# Patient Record
Sex: FEMALE | Race: WHITE | NOT HISPANIC OR LATINO | Employment: FULL TIME | URBAN - METROPOLITAN AREA
[De-identification: names, ages, dates, MRNs, and addresses within clinical notes are randomized per-mention and may not be internally consistent; named-entity substitution may affect disease eponyms.]

---

## 2023-05-04 LAB
ALANINE AMINOTRANSFERASE (SGPT) (U/L) IN SER/PLAS: 12 U/L (ref 7–45)
ALBUMIN (G/DL) IN SER/PLAS: 3.9 G/DL (ref 3.4–5)
ALKALINE PHOSPHATASE (U/L) IN SER/PLAS: 80 U/L (ref 33–110)
ANION GAP IN SER/PLAS: 12 MMOL/L (ref 10–20)
ASPARTATE AMINOTRANSFERASE (SGOT) (U/L) IN SER/PLAS: 11 U/L (ref 9–39)
BASOPHILS (10*3/UL) IN BLOOD BY AUTOMATED COUNT: 0.02 X10E9/L (ref 0–0.1)
BASOPHILS/100 LEUKOCYTES IN BLOOD BY AUTOMATED COUNT: 0.2 % (ref 0–2)
BILIRUBIN TOTAL (MG/DL) IN SER/PLAS: 0.5 MG/DL (ref 0–1.2)
CALCIDIOL (25 OH VITAMIN D3) (NG/ML) IN SER/PLAS: 32 NG/ML
CALCIUM (MG/DL) IN SER/PLAS: 9.5 MG/DL (ref 8.6–10.6)
CARBON DIOXIDE, TOTAL (MMOL/L) IN SER/PLAS: 25 MMOL/L (ref 21–32)
CHLORIDE (MMOL/L) IN SER/PLAS: 109 MMOL/L (ref 98–107)
CHOLESTEROL (MG/DL) IN SER/PLAS: 175 MG/DL (ref 0–199)
CHOLESTEROL IN HDL (MG/DL) IN SER/PLAS: 44.9 MG/DL
CHOLESTEROL/HDL RATIO: 3.9
COBALAMIN (VITAMIN B12) (PG/ML) IN SER/PLAS: 383 PG/ML (ref 211–911)
CREATININE (MG/DL) IN SER/PLAS: 0.86 MG/DL (ref 0.5–1.05)
EOSINOPHILS (10*3/UL) IN BLOOD BY AUTOMATED COUNT: 0.14 X10E9/L (ref 0–0.7)
EOSINOPHILS/100 LEUKOCYTES IN BLOOD BY AUTOMATED COUNT: 1.6 % (ref 0–6)
ERYTHROCYTE DISTRIBUTION WIDTH (RATIO) BY AUTOMATED COUNT: 12.3 % (ref 11.5–14.5)
ERYTHROCYTE MEAN CORPUSCULAR HEMOGLOBIN CONCENTRATION (G/DL) BY AUTOMATED: 32.9 G/DL (ref 32–36)
ERYTHROCYTE MEAN CORPUSCULAR VOLUME (FL) BY AUTOMATED COUNT: 94 FL (ref 80–100)
ERYTHROCYTES (10*6/UL) IN BLOOD BY AUTOMATED COUNT: 4.45 X10E12/L (ref 4–5.2)
ESTIMATED AVERAGE GLUCOSE FOR HBA1C: 94 MG/DL
FERRITIN (UG/LL) IN SER/PLAS: 65 UG/L (ref 8–150)
GFR FEMALE: 85 ML/MIN/1.73M2
GLUCOSE (MG/DL) IN SER/PLAS: 86 MG/DL (ref 74–99)
HEMATOCRIT (%) IN BLOOD BY AUTOMATED COUNT: 41.9 % (ref 36–46)
HEMOGLOBIN (G/DL) IN BLOOD: 13.8 G/DL (ref 12–16)
HEMOGLOBIN A1C/HEMOGLOBIN TOTAL IN BLOOD: 4.9 %
IMMATURE GRANULOCYTES/100 LEUKOCYTES IN BLOOD BY AUTOMATED COUNT: 0.2 % (ref 0–0.9)
LDL: 110 MG/DL (ref 0–99)
LEUKOCYTES (10*3/UL) IN BLOOD BY AUTOMATED COUNT: 8.6 X10E9/L (ref 4.4–11.3)
LYMPHOCYTES (10*3/UL) IN BLOOD BY AUTOMATED COUNT: 3.25 X10E9/L (ref 1.2–4.8)
LYMPHOCYTES/100 LEUKOCYTES IN BLOOD BY AUTOMATED COUNT: 37.7 % (ref 13–44)
MONOCYTES (10*3/UL) IN BLOOD BY AUTOMATED COUNT: 0.59 X10E9/L (ref 0.1–1)
MONOCYTES/100 LEUKOCYTES IN BLOOD BY AUTOMATED COUNT: 6.9 % (ref 2–10)
NEUTROPHILS (10*3/UL) IN BLOOD BY AUTOMATED COUNT: 4.59 X10E9/L (ref 1.2–7.7)
NEUTROPHILS/100 LEUKOCYTES IN BLOOD BY AUTOMATED COUNT: 53.4 % (ref 40–80)
NRBC (PER 100 WBCS) BY AUTOMATED COUNT: 0 /100 WBC (ref 0–0)
PLATELETS (10*3/UL) IN BLOOD AUTOMATED COUNT: 282 X10E9/L (ref 150–450)
POTASSIUM (MMOL/L) IN SER/PLAS: 4 MMOL/L (ref 3.5–5.3)
PROTEIN TOTAL: 6.9 G/DL (ref 6.4–8.2)
SODIUM (MMOL/L) IN SER/PLAS: 142 MMOL/L (ref 136–145)
THYROTROPIN (MIU/L) IN SER/PLAS BY DETECTION LIMIT <= 0.05 MIU/L: 1.05 MIU/L (ref 0.44–3.98)
TRIGLYCERIDE (MG/DL) IN SER/PLAS: 99 MG/DL (ref 0–149)
UREA NITROGEN (MG/DL) IN SER/PLAS: 18 MG/DL (ref 6–23)
VLDL: 20 MG/DL (ref 0–40)

## 2023-08-30 PROBLEM — N20.0 NEPHROLITHIASIS: Status: ACTIVE | Noted: 2023-08-30

## 2023-08-30 PROBLEM — G47.00 INSOMNIA: Status: ACTIVE | Noted: 2023-08-30

## 2023-08-30 PROBLEM — B08.5 ACUTE HERPANGINA: Status: ACTIVE | Noted: 2023-08-30

## 2023-08-30 PROBLEM — E66.811 OBESITY, CLASS I, BMI 30-34.9: Status: ACTIVE | Noted: 2023-08-30

## 2023-08-30 PROBLEM — M17.12 OSTEOARTHRITIS OF LEFT KNEE: Status: ACTIVE | Noted: 2023-08-30

## 2023-08-30 PROBLEM — M17.11 OSTEOARTHRITIS OF RIGHT KNEE: Status: ACTIVE | Noted: 2023-08-30

## 2023-08-30 PROBLEM — R82.993 HIGH URIC ACID IN 24 HOUR URINE SPECIMEN: Status: ACTIVE | Noted: 2023-08-30

## 2023-08-30 PROBLEM — F43.23 ADJUSTMENT DISORDER WITH MIXED ANXIETY AND DEPRESSED MOOD: Status: ACTIVE | Noted: 2023-08-30

## 2023-08-30 PROBLEM — M25.569 KNEE PAIN: Status: ACTIVE | Noted: 2023-08-30

## 2023-08-30 PROBLEM — N95.2 VAGINAL ATROPHY: Status: ACTIVE | Noted: 2023-08-30

## 2023-08-30 PROBLEM — K58.2 MIXED IRRITABLE BOWEL SYNDROME: Status: ACTIVE | Noted: 2023-08-30

## 2023-08-30 PROBLEM — M25.361 INSTABILITY OF RIGHT KNEE JOINT: Status: ACTIVE | Noted: 2023-08-30

## 2023-08-30 PROBLEM — R10.9 LEFT FLANK PAIN: Status: ACTIVE | Noted: 2023-08-30

## 2023-08-30 PROBLEM — K59.04 CHRONIC IDIOPATHIC CONSTIPATION: Status: ACTIVE | Noted: 2023-08-30

## 2023-08-30 PROBLEM — N20.0 CALCIUM OXALATE STONES: Status: ACTIVE | Noted: 2023-08-30

## 2023-08-30 PROBLEM — G47.33 OSA (OBSTRUCTIVE SLEEP APNEA): Status: ACTIVE | Noted: 2023-08-30

## 2023-08-30 PROBLEM — R14.0 ABDOMINAL BLOATING: Status: ACTIVE | Noted: 2023-08-30

## 2023-08-30 PROBLEM — M54.2 NECK PAIN: Status: ACTIVE | Noted: 2023-08-30

## 2023-08-30 PROBLEM — N89.8 VAGINAL IRRITATION: Status: ACTIVE | Noted: 2023-08-30

## 2023-08-30 PROBLEM — I47.11 INAPPROPRIATE SINUS TACHYCARDIA (CMS-HCC): Status: ACTIVE | Noted: 2023-08-30

## 2023-08-30 PROBLEM — N32.81 OVERACTIVE BLADDER: Status: ACTIVE | Noted: 2023-08-30

## 2023-08-30 PROBLEM — E66.9 OBESITY, CLASS I, BMI 30-34.9: Status: ACTIVE | Noted: 2023-08-30

## 2023-08-30 PROBLEM — F32.A DEPRESSION: Status: ACTIVE | Noted: 2023-08-30

## 2023-08-30 PROBLEM — G89.18 POSTOPERATIVE PAIN: Status: ACTIVE | Noted: 2023-08-30

## 2023-08-30 PROBLEM — H02.886 MEIBOMIAN GLAND DYSFUNCTION (MGD) OF LEFT EYE: Status: ACTIVE | Noted: 2023-08-30

## 2023-08-30 PROBLEM — R35.0 URINARY FREQUENCY: Status: ACTIVE | Noted: 2023-08-30

## 2023-08-30 PROBLEM — R63.5 WEIGHT GAIN: Status: ACTIVE | Noted: 2023-08-30

## 2023-08-30 PROBLEM — M25.561 RIGHT KNEE PAIN: Status: ACTIVE | Noted: 2023-08-30

## 2023-08-30 PROBLEM — F41.9 ANXIETY: Status: ACTIVE | Noted: 2023-08-30

## 2023-08-30 PROBLEM — R32 INCONTINENCE IN FEMALE: Status: ACTIVE | Noted: 2023-08-30

## 2023-08-30 PROBLEM — K58.0 IRRITABLE BOWEL SYNDROME WITH DIARRHEA: Status: ACTIVE | Noted: 2023-08-30

## 2023-08-30 PROBLEM — R00.2 PALPITATIONS: Status: ACTIVE | Noted: 2023-08-30

## 2023-08-30 PROBLEM — E55.9 VITAMIN D DEFICIENCY: Status: ACTIVE | Noted: 2023-08-30

## 2023-08-30 PROBLEM — R53.83 FATIGUE: Status: ACTIVE | Noted: 2023-08-30

## 2023-08-30 PROBLEM — R10.13 DYSPEPSIA: Status: ACTIVE | Noted: 2023-08-30

## 2023-08-30 PROBLEM — E05.90 HYPERTHYROIDISM: Status: ACTIVE | Noted: 2023-08-30

## 2023-08-30 PROBLEM — G24.3 CERVICAL DYSTONIA: Status: ACTIVE | Noted: 2023-08-30

## 2023-08-30 PROBLEM — G43.711 CHRONIC MIGRAINE WITHOUT AURA, WITH INTRACTABLE MIGRAINE, SO STATED, WITH STATUS MIGRAINOSUS: Status: ACTIVE | Noted: 2023-08-30

## 2023-08-30 PROBLEM — K58.1 IRRITABLE BOWEL SYNDROME WITH CONSTIPATION: Status: ACTIVE | Noted: 2023-08-30

## 2023-08-30 PROBLEM — R10.2 PELVIC PAIN IN FEMALE: Status: ACTIVE | Noted: 2023-08-30

## 2023-08-30 PROBLEM — K30 FUNCTIONAL DYSPEPSIA: Status: ACTIVE | Noted: 2023-08-30

## 2023-08-30 PROBLEM — G43.909 MIGRAINE: Status: ACTIVE | Noted: 2023-08-30

## 2023-08-30 PROBLEM — D72.829 LEUKOCYTOSIS: Status: ACTIVE | Noted: 2023-08-30

## 2023-08-30 PROBLEM — R31.0 FRANK HEMATURIA: Status: ACTIVE | Noted: 2023-08-30

## 2023-08-30 PROBLEM — N39.3 URINE, INCONTINENCE, STRESS FEMALE: Status: ACTIVE | Noted: 2023-08-30

## 2023-08-30 RX ORDER — TAMSULOSIN HYDROCHLORIDE 0.4 MG/1
0.4 CAPSULE ORAL DAILY
COMMUNITY
End: 2023-10-09 | Stop reason: ALTCHOICE

## 2023-08-30 RX ORDER — LOTEPREDNOL ETABONATE 5 MG/G
GEL OPHTHALMIC
COMMUNITY
End: 2023-10-09 | Stop reason: SDUPTHER

## 2023-08-30 RX ORDER — ERGOCALCIFEROL 1.25 MG/1
1 CAPSULE ORAL
COMMUNITY
Start: 2021-10-07 | End: 2023-10-09 | Stop reason: ALTCHOICE

## 2023-08-30 RX ORDER — DESIPRAMINE HYDROCHLORIDE 10 MG/1
1 TABLET ORAL DAILY
COMMUNITY
Start: 2022-04-07 | End: 2023-10-09 | Stop reason: ALTCHOICE

## 2023-08-30 RX ORDER — METFORMIN HYDROCHLORIDE 1000 MG/1
1 TABLET ORAL
COMMUNITY
Start: 2021-09-28 | End: 2023-10-09 | Stop reason: ALTCHOICE

## 2023-08-30 RX ORDER — NORETHINDRONE ACETATE AND ETHINYL ESTRADIOL AND FERROUS FUMARATE 1MG-20(24)
1 KIT ORAL DAILY
COMMUNITY
Start: 2021-12-10 | End: 2023-10-09 | Stop reason: SDUPTHER

## 2023-08-30 RX ORDER — LIFITEGRAST 50 MG/ML
1 SOLUTION/ DROPS OPHTHALMIC 2 TIMES DAILY
COMMUNITY
Start: 2018-09-19 | End: 2023-10-09 | Stop reason: SDUPTHER

## 2023-08-30 RX ORDER — ONDANSETRON HYDROCHLORIDE 8 MG/1
8 TABLET, FILM COATED ORAL EVERY 8 HOURS PRN
COMMUNITY
Start: 2022-07-13 | End: 2024-02-16 | Stop reason: SDUPTHER

## 2023-08-30 RX ORDER — TIZANIDINE HYDROCHLORIDE 4 MG/1
4 CAPSULE, GELATIN COATED ORAL NIGHTLY
COMMUNITY
Start: 2022-01-19 | End: 2023-10-09 | Stop reason: SDUPTHER

## 2023-08-30 RX ORDER — PREDNISONE 20 MG/1
2 TABLET ORAL DAILY
COMMUNITY
Start: 2022-10-04 | End: 2023-10-09 | Stop reason: ALTCHOICE

## 2023-08-30 RX ORDER — ONDANSETRON 4 MG/1
4 TABLET, ORALLY DISINTEGRATING ORAL EVERY 8 HOURS PRN
COMMUNITY
End: 2023-10-09 | Stop reason: SDUPTHER

## 2023-08-30 RX ORDER — LIRAGLUTIDE 6 MG/ML
3 INJECTION, SOLUTION SUBCUTANEOUS DAILY
COMMUNITY
End: 2023-10-09 | Stop reason: ALTCHOICE

## 2023-08-30 RX ORDER — PRUCALOPRIDE 2 MG/1
1 TABLET, FILM COATED ORAL DAILY
COMMUNITY
Start: 2022-07-22 | End: 2023-10-09 | Stop reason: ALTCHOICE

## 2023-08-30 RX ORDER — CARVEDILOL 3.12 MG/1
3.12 TABLET ORAL 2 TIMES DAILY
COMMUNITY
End: 2023-10-09 | Stop reason: ALTCHOICE

## 2023-08-30 RX ORDER — FREMANEZUMAB-VFRM 225 MG/1.5ML
225 INJECTION SUBCUTANEOUS
COMMUNITY
Start: 2021-10-19 | End: 2023-10-09 | Stop reason: SDUPTHER

## 2023-08-30 RX ORDER — NORETHINDRONE ACETATE AND ETHINYL ESTRADIOL AND FERROUS FUMARATE 1MG-20(24)
KIT ORAL
COMMUNITY
End: 2023-10-09 | Stop reason: ALTCHOICE

## 2023-08-30 RX ORDER — KETOROLAC TROMETHAMINE 10 MG/1
10 TABLET, FILM COATED ORAL EVERY 8 HOURS PRN
COMMUNITY
End: 2023-10-09 | Stop reason: ALTCHOICE

## 2023-08-30 RX ORDER — LATANOPROST 50 UG/ML
1 SOLUTION/ DROPS OPHTHALMIC 2 TIMES DAILY
COMMUNITY
Start: 2020-02-10

## 2023-08-30 RX ORDER — PHENAZOPYRIDINE HYDROCHLORIDE 200 MG/1
200 TABLET, FILM COATED ORAL 3 TIMES DAILY PRN
COMMUNITY
End: 2023-10-09 | Stop reason: ALTCHOICE

## 2023-08-30 RX ORDER — LIDOCAINE HYDROCHLORIDE 20 MG/ML
SOLUTION OROPHARYNGEAL
COMMUNITY
Start: 2022-09-11 | End: 2023-10-09 | Stop reason: ALTCHOICE

## 2023-08-30 RX ORDER — GABAPENTIN 600 MG/1
600 TABLET ORAL DAILY
COMMUNITY
Start: 2020-07-07 | End: 2024-05-31 | Stop reason: SDUPTHER

## 2023-08-30 RX ORDER — METOPROLOL SUCCINATE 50 MG/1
50 TABLET, EXTENDED RELEASE ORAL DAILY
COMMUNITY
End: 2023-10-09 | Stop reason: ALTCHOICE

## 2023-08-30 RX ORDER — UBROGEPANT 50 MG/1
TABLET ORAL
COMMUNITY
End: 2023-10-09 | Stop reason: SDUPTHER

## 2023-08-30 RX ORDER — RIZATRIPTAN BENZOATE 10 MG/1
10 TABLET, ORALLY DISINTEGRATING ORAL ONCE AS NEEDED
COMMUNITY
Start: 2013-06-03 | End: 2023-10-09 | Stop reason: SDUPTHER

## 2023-08-30 RX ORDER — OXYBUTYNIN CHLORIDE 5 MG/1
1 TABLET, EXTENDED RELEASE ORAL DAILY
COMMUNITY
Start: 2022-02-03 | End: 2023-10-09 | Stop reason: SDUPTHER

## 2023-08-30 RX ORDER — BACLOFEN 10 MG/1
10 TABLET ORAL 3 TIMES DAILY PRN
COMMUNITY
End: 2023-10-09 | Stop reason: SDUPTHER

## 2023-08-30 RX ORDER — ACETAMINOPHEN, ASPIRIN (NSAID), AND CAFFEINE 250; 250; 65 MG/1; MG/1; MG/1
2 TABLET, FILM COATED ORAL DAILY
COMMUNITY

## 2023-08-30 RX ORDER — OMEPRAZOLE 20 MG/1
20 CAPSULE, DELAYED RELEASE ORAL
COMMUNITY
Start: 2018-09-10 | End: 2023-10-09 | Stop reason: SDUPTHER

## 2023-08-30 RX ORDER — NORETHINDRONE ACETATE AND ETHINYL ESTRADIOL AND FERROUS FUMARATE 1MG-20(24)
1 KIT ORAL DAILY
COMMUNITY
End: 2023-10-09 | Stop reason: SDUPTHER

## 2023-08-30 RX ORDER — CEPHALEXIN 500 MG/1
CAPSULE ORAL
COMMUNITY
End: 2023-10-09 | Stop reason: ALTCHOICE

## 2023-08-30 RX ORDER — CYANOCOBALAMIN 1000 UG/ML
INJECTION, SOLUTION INTRAMUSCULAR; SUBCUTANEOUS
COMMUNITY

## 2023-08-30 RX ORDER — PLECANATIDE 3 MG/1
1 TABLET ORAL DAILY
COMMUNITY
Start: 2022-10-13

## 2023-08-30 RX ORDER — OXYCODONE HYDROCHLORIDE 5 MG/1
5 TABLET ORAL EVERY 4 HOURS PRN
COMMUNITY
End: 2023-10-09 | Stop reason: ALTCHOICE

## 2023-08-31 ENCOUNTER — PATIENT MESSAGE (OUTPATIENT)
Dept: PRIMARY CARE | Facility: CLINIC | Age: 48
End: 2023-08-31
Payer: COMMERCIAL

## 2023-08-31 DIAGNOSIS — N30.00 ACUTE CYSTITIS WITHOUT HEMATURIA: Primary | ICD-10-CM

## 2023-09-07 ENCOUNTER — HOSPITAL ENCOUNTER (OUTPATIENT)
Dept: DATA CONVERSION | Facility: HOSPITAL | Age: 48
End: 2023-09-07

## 2023-09-07 DIAGNOSIS — Z12.31 ENCOUNTER FOR SCREENING MAMMOGRAM FOR MALIGNANT NEOPLASM OF BREAST: ICD-10-CM

## 2023-09-07 RX ORDER — SULFAMETHOXAZOLE AND TRIMETHOPRIM 800; 160 MG/1; MG/1
1 TABLET ORAL 2 TIMES DAILY
Qty: 14 TABLET | Refills: 0 | Status: SHIPPED | OUTPATIENT
Start: 2023-09-07 | End: 2023-09-14

## 2023-10-08 ASSESSMENT — PROMIS GLOBAL HEALTH SCALE
RATE_MENTAL_HEALTH: EXCELLENT
RATE_SOCIAL_SATISFACTION: VERY GOOD
RATE_AVERAGE_PAIN: 0
CARRYOUT_SOCIAL_ACTIVITIES: EXCELLENT
CARRYOUT_PHYSICAL_ACTIVITIES: COMPLETELY
EMOTIONAL_PROBLEMS: NEVER
RATE_AVERAGE_FATIGUE: MILD
RATE_GENERAL_HEALTH: VERY GOOD
RATE_QUALITY_OF_LIFE: VERY GOOD
RATE_PHYSICAL_HEALTH: VERY GOOD

## 2023-10-09 ENCOUNTER — LAB (OUTPATIENT)
Dept: LAB | Facility: LAB | Age: 48
End: 2023-10-09
Payer: COMMERCIAL

## 2023-10-09 ENCOUNTER — OFFICE VISIT (OUTPATIENT)
Dept: PRIMARY CARE | Facility: CLINIC | Age: 48
End: 2023-10-09
Payer: COMMERCIAL

## 2023-10-09 VITALS
OXYGEN SATURATION: 99 % | TEMPERATURE: 97.3 F | SYSTOLIC BLOOD PRESSURE: 121 MMHG | HEART RATE: 110 BPM | HEIGHT: 68 IN | WEIGHT: 150 LBS | DIASTOLIC BLOOD PRESSURE: 89 MMHG | BODY MASS INDEX: 22.73 KG/M2

## 2023-10-09 DIAGNOSIS — N32.81 OVERACTIVE BLADDER: ICD-10-CM

## 2023-10-09 DIAGNOSIS — I47.11 INAPPROPRIATE SINUS TACHYCARDIA (CMS-HCC): ICD-10-CM

## 2023-10-09 DIAGNOSIS — E55.9 VITAMIN D DEFICIENCY: ICD-10-CM

## 2023-10-09 DIAGNOSIS — R00.2 PALPITATIONS: ICD-10-CM

## 2023-10-09 DIAGNOSIS — Z12.31 ENCOUNTER FOR SCREENING MAMMOGRAM FOR MALIGNANT NEOPLASM OF BREAST: ICD-10-CM

## 2023-10-09 DIAGNOSIS — Z00.00 WELL ADULT EXAM: ICD-10-CM

## 2023-10-09 DIAGNOSIS — K30 FUNCTIONAL DYSPEPSIA: ICD-10-CM

## 2023-10-09 DIAGNOSIS — Z00.00 WELL ADULT EXAM: Primary | ICD-10-CM

## 2023-10-09 DIAGNOSIS — K59.04 CHRONIC IDIOPATHIC CONSTIPATION: ICD-10-CM

## 2023-10-09 DIAGNOSIS — R00.0 TACHYCARDIA: ICD-10-CM

## 2023-10-09 DIAGNOSIS — R10.32 LLQ PAIN: ICD-10-CM

## 2023-10-09 PROBLEM — R63.5 WEIGHT GAIN: Status: RESOLVED | Noted: 2023-08-30 | Resolved: 2023-10-09

## 2023-10-09 PROBLEM — R10.13 DYSPEPSIA: Status: RESOLVED | Noted: 2023-08-30 | Resolved: 2023-10-09

## 2023-10-09 PROBLEM — F32.A DEPRESSION: Status: RESOLVED | Noted: 2023-08-30 | Resolved: 2023-10-09

## 2023-10-09 PROBLEM — F41.9 ANXIETY: Status: RESOLVED | Noted: 2023-08-30 | Resolved: 2023-10-09

## 2023-10-09 PROBLEM — R10.2 PELVIC PAIN IN FEMALE: Status: RESOLVED | Noted: 2023-08-30 | Resolved: 2023-10-09

## 2023-10-09 PROBLEM — F43.23 ADJUSTMENT DISORDER WITH MIXED ANXIETY AND DEPRESSED MOOD: Status: RESOLVED | Noted: 2023-08-30 | Resolved: 2023-10-09

## 2023-10-09 PROBLEM — D72.829 LEUKOCYTOSIS: Status: RESOLVED | Noted: 2023-08-30 | Resolved: 2023-10-09

## 2023-10-09 PROBLEM — R14.0 ABDOMINAL BLOATING: Status: RESOLVED | Noted: 2023-08-30 | Resolved: 2023-10-09

## 2023-10-09 PROBLEM — K58.0 IRRITABLE BOWEL SYNDROME WITH DIARRHEA: Status: RESOLVED | Noted: 2023-08-30 | Resolved: 2023-10-09

## 2023-10-09 PROBLEM — R10.9 LEFT FLANK PAIN: Status: RESOLVED | Noted: 2023-08-30 | Resolved: 2023-10-09

## 2023-10-09 PROBLEM — B08.5 ACUTE HERPANGINA: Status: RESOLVED | Noted: 2023-08-30 | Resolved: 2023-10-09

## 2023-10-09 PROBLEM — K58.2 MIXED IRRITABLE BOWEL SYNDROME: Status: RESOLVED | Noted: 2023-08-30 | Resolved: 2023-10-09

## 2023-10-09 LAB
25(OH)D3 SERPL-MCNC: 33 NG/ML (ref 30–100)
ALBUMIN SERPL BCP-MCNC: 4 G/DL (ref 3.4–5)
ALP SERPL-CCNC: 75 U/L (ref 33–110)
ALT SERPL W P-5'-P-CCNC: 10 U/L (ref 7–45)
ANION GAP SERPL CALC-SCNC: 13 MMOL/L (ref 10–20)
APPEARANCE UR: ABNORMAL
AST SERPL W P-5'-P-CCNC: 12 U/L (ref 9–39)
BACTERIA #/AREA URNS AUTO: ABNORMAL /HPF
BILIRUB SERPL-MCNC: 0.6 MG/DL (ref 0–1.2)
BILIRUB UR STRIP.AUTO-MCNC: NEGATIVE MG/DL
BUN SERPL-MCNC: 12 MG/DL (ref 6–23)
CALCIUM SERPL-MCNC: 9.5 MG/DL (ref 8.6–10.6)
CAOX CRY #/AREA UR COMP ASSIST: ABNORMAL /HPF
CHLORIDE SERPL-SCNC: 108 MMOL/L (ref 98–107)
CHOLEST SERPL-MCNC: 188 MG/DL (ref 0–199)
CHOLESTEROL/HDL RATIO: 3.3
CO2 SERPL-SCNC: 24 MMOL/L (ref 21–32)
COLOR UR: YELLOW
CREAT SERPL-MCNC: 0.62 MG/DL (ref 0.5–1.05)
ERYTHROCYTE [DISTWIDTH] IN BLOOD BY AUTOMATED COUNT: 12.8 % (ref 11.5–14.5)
GFR SERPL CREATININE-BSD FRML MDRD: >90 ML/MIN/1.73M*2
GLUCOSE SERPL-MCNC: 89 MG/DL (ref 74–99)
GLUCOSE UR STRIP.AUTO-MCNC: NEGATIVE MG/DL
HCT VFR BLD AUTO: 44.7 % (ref 36–46)
HDLC SERPL-MCNC: 57 MG/DL
HGB BLD-MCNC: 14.7 G/DL (ref 12–16)
KETONES UR STRIP.AUTO-MCNC: NEGATIVE MG/DL
LDLC SERPL CALC-MCNC: 109 MG/DL (ref 140–190)
LEUKOCYTE ESTERASE UR QL STRIP.AUTO: ABNORMAL
MCH RBC QN AUTO: 31.1 PG (ref 26–34)
MCHC RBC AUTO-ENTMCNC: 32.9 G/DL (ref 32–36)
MCV RBC AUTO: 95 FL (ref 80–100)
MUCOUS THREADS #/AREA URNS AUTO: ABNORMAL /LPF
NITRITE UR QL STRIP.AUTO: NEGATIVE
NON HDL CHOLESTEROL: 131 MG/DL (ref 0–149)
NRBC BLD-RTO: 0 /100 WBCS (ref 0–0)
PH UR STRIP.AUTO: 5 [PH]
PLATELET # BLD AUTO: 233 X10*3/UL (ref 150–450)
PMV BLD AUTO: 10.6 FL (ref 7.5–11.5)
POTASSIUM SERPL-SCNC: 4.3 MMOL/L (ref 3.5–5.3)
PROT SERPL-MCNC: 6.9 G/DL (ref 6.4–8.2)
PROT UR STRIP.AUTO-MCNC: NEGATIVE MG/DL
RBC # BLD AUTO: 4.72 X10*6/UL (ref 4–5.2)
RBC # UR STRIP.AUTO: ABNORMAL /UL
RBC #/AREA URNS AUTO: ABNORMAL /HPF
SODIUM SERPL-SCNC: 141 MMOL/L (ref 136–145)
SP GR UR STRIP.AUTO: 1.02
SQUAMOUS #/AREA URNS AUTO: ABNORMAL /HPF
TRIGL SERPL-MCNC: 110 MG/DL (ref 0–149)
UROBILINOGEN UR STRIP.AUTO-MCNC: <2 MG/DL
VLDL: 22 MG/DL (ref 0–40)
WBC # BLD AUTO: 6.3 X10*3/UL (ref 4.4–11.3)
WBC #/AREA URNS AUTO: ABNORMAL /HPF

## 2023-10-09 PROCEDURE — 87086 URINE CULTURE/COLONY COUNT: CPT

## 2023-10-09 PROCEDURE — 99396 PREV VISIT EST AGE 40-64: CPT | Performed by: INTERNAL MEDICINE

## 2023-10-09 PROCEDURE — 82306 VITAMIN D 25 HYDROXY: CPT

## 2023-10-09 PROCEDURE — 36415 COLL VENOUS BLD VENIPUNCTURE: CPT

## 2023-10-09 PROCEDURE — 1036F TOBACCO NON-USER: CPT | Performed by: INTERNAL MEDICINE

## 2023-10-09 PROCEDURE — 80061 LIPID PANEL: CPT

## 2023-10-09 PROCEDURE — 85027 COMPLETE CBC AUTOMATED: CPT

## 2023-10-09 PROCEDURE — 80053 COMPREHEN METABOLIC PANEL: CPT

## 2023-10-09 PROCEDURE — 81001 URINALYSIS AUTO W/SCOPE: CPT

## 2023-10-09 RX ORDER — METOPROLOL SUCCINATE 50 MG/1
50 TABLET, EXTENDED RELEASE ORAL DAILY
Qty: 90 TABLET | Refills: 3 | Status: SHIPPED | OUTPATIENT
Start: 2023-10-09 | End: 2024-10-08

## 2023-10-09 RX ORDER — OMEPRAZOLE 20 MG/1
20 CAPSULE, DELAYED RELEASE ORAL
Qty: 90 CAPSULE | Refills: 3 | Status: SHIPPED | OUTPATIENT
Start: 2023-10-09 | End: 2024-10-08

## 2023-10-09 RX ORDER — TIRZEPATIDE 5 MG/.5ML
5 INJECTION, SOLUTION SUBCUTANEOUS
COMMUNITY

## 2023-10-09 RX ORDER — OXYBUTYNIN CHLORIDE 10 MG/1
10 TABLET, EXTENDED RELEASE ORAL DAILY
Qty: 90 TABLET | Refills: 3 | Status: SHIPPED | OUTPATIENT
Start: 2023-10-09 | End: 2024-10-08

## 2023-10-09 ASSESSMENT — LIFESTYLE VARIABLES: HOW OFTEN DO YOU HAVE A DRINK CONTAINING ALCOHOL: MONTHLY OR LESS

## 2023-10-09 ASSESSMENT — PATIENT HEALTH QUESTIONNAIRE - PHQ9
SUM OF ALL RESPONSES TO PHQ9 QUESTIONS 1 & 2: 0
2. FEELING DOWN, DEPRESSED OR HOPELESS: NOT AT ALL
1. LITTLE INTEREST OR PLEASURE IN DOING THINGS: NOT AT ALL

## 2023-10-09 ASSESSMENT — COLUMBIA-SUICIDE SEVERITY RATING SCALE - C-SSRS
6. HAVE YOU EVER DONE ANYTHING, STARTED TO DO ANYTHING, OR PREPARED TO DO ANYTHING TO END YOUR LIFE?: NO
1. IN THE PAST MONTH, HAVE YOU WISHED YOU WERE DEAD OR WISHED YOU COULD GO TO SLEEP AND NOT WAKE UP?: NO
2. HAVE YOU ACTUALLY HAD ANY THOUGHTS OF KILLING YOURSELF?: NO

## 2023-10-09 NOTE — PROGRESS NOTES
"Subjective   Patient ID: Florence Albarran is a 47 y.o. female who presents for Annual Exam.    HPI     Remains on mounjaro through weight loss clinic.  Weight remains down.    Waking up every hour or two at night to urinate.  Treated for UTI recently and dysuria resolved but still with frequency.    H/o IBS with constipation.  Gets sharp stabbing pain multiple times per day in the LLQ.  Had same pain when she was constipated a few years ago.  Worse over the past few weeks.  Not related to eating  Not better after having a BM  Bms are at baseline. She feels her current meds help prevent any constipation.  On continuous birth control, LMP many years ago.    She is off metoprolol. Denies any regular palpitations. Resting HR can go up to 120. Not checking BP.    Mood very well. Denies anxiety or depression.    No exercise.  Diet is healthy.    Review of Systems    Objective   /89   Pulse 110   Temp 36.3 °C (97.3 °F)   Ht 1.727 m (5' 8\")   Wt 68 kg (150 lb)   SpO2 99%   BMI 22.81 kg/m²     Physical Exam  Constitutional:       Appearance: Normal appearance.   HENT:      Right Ear: Tympanic membrane and ear canal normal.      Left Ear: Tympanic membrane and ear canal normal.      Mouth/Throat:      Mouth: Mucous membranes are moist.   Eyes:      Extraocular Movements: Extraocular movements intact.      Pupils: Pupils are equal, round, and reactive to light.   Cardiovascular:      Rate and Rhythm: Normal rate and regular rhythm.      Heart sounds: No murmur heard.  Pulmonary:      Effort: Pulmonary effort is normal.      Breath sounds: Normal breath sounds.   Abdominal:      General: Bowel sounds are normal.      Palpations: Abdomen is soft.      Tenderness: There is no abdominal tenderness.   Musculoskeletal:         General: No deformity.      Cervical back: Neck supple.      Right lower leg: No edema.      Left lower leg: No edema.   Skin:     Findings: No lesion or rash.   Neurological:      Mental Status: " She is alert.      Cranial Nerves: No cranial nerve deficit.      Motor: No weakness.      Gait: Gait normal.         Assessment/Plan   Problem List Items Addressed This Visit             ICD-10-CM    Chronic idiopathic constipation K59.04    Functional dyspepsia K30    Relevant Medications    omeprazole (PriLOSEC) 20 mg DR capsule    Inappropriate sinus tachycardia I47.11    Relevant Medications    metoprolol succinate XL (Toprol-XL) 50 mg 24 hr tablet    Overactive bladder N32.81    Relevant Medications    oxybutynin XL (Ditropan-XL) 10 mg 24 hr tablet    Other Relevant Orders    Urinalysis with Reflex Microscopic    Urine Culture    Palpitations R00.2    Vitamin D deficiency E55.9    Relevant Orders    Vitamin D 25-Hydroxy,Total (for eval of Vitamin D levels)    Well adult exam - Primary Z00.00    Relevant Orders    CBC    Comprehensive Metabolic Panel    Lipid Panel     Other Visit Diagnoses         Codes    Tachycardia     R00.0    Relevant Medications    metoprolol succinate XL (Toprol-XL) 50 mg 24 hr tablet    LLQ pain     R10.32    Relevant Orders    XR abdomen 1 view    Encounter for screening mammogram for malignant neoplasm of breast     Z12.31    Relevant Orders    BI mammo bilateral screening tomosynthesis             GERD, IBS - Seeing GI. Had normal EGD 3/2022.  Will get abd xray for the LLQ pain. This could be related to constipation. If xray looks ok, will have her follow up with her GI.     Elevated BP, palpitations - restart metoprolol     Migraines - Seeing Neuro     Obesity - On mounjaro through weight loss clinic.    OAB - check urine, increase oxybutynin     Health maintenance  -Colonoscopy 8/2022 normal  -Follows with Gyn  -Mammogram - nml 12/2022, ordered  -Immunizations   -Tdap 2017   -Covid recommended booster   -Flu gets through work     F/u 3 months

## 2023-10-10 ENCOUNTER — PHARMACY VISIT (OUTPATIENT)
Dept: PHARMACY | Facility: CLINIC | Age: 48
End: 2023-10-10
Payer: COMMERCIAL

## 2023-10-10 ENCOUNTER — SPECIALTY PHARMACY (OUTPATIENT)
Dept: PHARMACY | Facility: CLINIC | Age: 48
End: 2023-10-10

## 2023-10-10 LAB — BACTERIA UR CULT: NO GROWTH

## 2023-10-10 PROCEDURE — RXMED WILLOW AMBULATORY MEDICATION CHARGE

## 2023-10-12 DIAGNOSIS — R35.0 URINARY FREQUENCY: Primary | ICD-10-CM

## 2023-10-13 ENCOUNTER — SPECIALTY PHARMACY (OUTPATIENT)
Dept: PHARMACY | Facility: CLINIC | Age: 48
End: 2023-10-13

## 2023-10-14 ENCOUNTER — ANCILLARY PROCEDURE (OUTPATIENT)
Dept: RADIOLOGY | Facility: CLINIC | Age: 48
End: 2023-10-14
Payer: COMMERCIAL

## 2023-10-14 ENCOUNTER — LAB (OUTPATIENT)
Dept: LAB | Facility: LAB | Age: 48
End: 2023-10-14
Payer: COMMERCIAL

## 2023-10-14 DIAGNOSIS — R10.32 LLQ PAIN: ICD-10-CM

## 2023-10-14 DIAGNOSIS — R35.0 URINARY FREQUENCY: ICD-10-CM

## 2023-10-14 LAB
APPEARANCE UR: ABNORMAL
BILIRUB UR STRIP.AUTO-MCNC: NEGATIVE MG/DL
CAOX CRY #/AREA UR COMP ASSIST: ABNORMAL /HPF
COLOR UR: ABNORMAL
GLUCOSE UR STRIP.AUTO-MCNC: NEGATIVE MG/DL
KETONES UR STRIP.AUTO-MCNC: NEGATIVE MG/DL
LEUKOCYTE ESTERASE UR QL STRIP.AUTO: ABNORMAL
MUCOUS THREADS #/AREA URNS AUTO: ABNORMAL /LPF
NITRITE UR QL STRIP.AUTO: NEGATIVE
PH UR STRIP.AUTO: 5 [PH]
PROT UR STRIP.AUTO-MCNC: NEGATIVE MG/DL
RBC # UR STRIP.AUTO: ABNORMAL /UL
RBC #/AREA URNS AUTO: ABNORMAL /HPF
SP GR UR STRIP.AUTO: 1.02
SQUAMOUS #/AREA URNS AUTO: ABNORMAL /HPF
UROBILINOGEN UR STRIP.AUTO-MCNC: <2 MG/DL
WBC #/AREA URNS AUTO: ABNORMAL /HPF

## 2023-10-14 PROCEDURE — 87086 URINE CULTURE/COLONY COUNT: CPT

## 2023-10-14 PROCEDURE — 81001 URINALYSIS AUTO W/SCOPE: CPT

## 2023-10-14 PROCEDURE — 74018 RADEX ABDOMEN 1 VIEW: CPT

## 2023-10-15 LAB — BACTERIA UR CULT: NO GROWTH

## 2023-10-16 ENCOUNTER — PHARMACY VISIT (OUTPATIENT)
Dept: PHARMACY | Facility: CLINIC | Age: 48
End: 2023-10-16
Payer: COMMERCIAL

## 2023-10-16 PROCEDURE — RXMED WILLOW AMBULATORY MEDICATION CHARGE

## 2023-10-19 ENCOUNTER — PHARMACY VISIT (OUTPATIENT)
Dept: PHARMACY | Facility: CLINIC | Age: 48
End: 2023-10-19
Payer: COMMERCIAL

## 2023-10-19 PROCEDURE — RXMED WILLOW AMBULATORY MEDICATION CHARGE

## 2023-10-20 ENCOUNTER — PROCEDURE VISIT (OUTPATIENT)
Dept: NEUROLOGY | Facility: HOSPITAL | Age: 48
End: 2023-10-20
Payer: COMMERCIAL

## 2023-10-20 VITALS
BODY MASS INDEX: 21.67 KG/M2 | WEIGHT: 143 LBS | SYSTOLIC BLOOD PRESSURE: 120 MMHG | HEART RATE: 107 BPM | TEMPERATURE: 97.7 F | DIASTOLIC BLOOD PRESSURE: 77 MMHG | HEIGHT: 68 IN

## 2023-10-20 DIAGNOSIS — G43.711 CHRONIC MIGRAINE WITHOUT AURA, WITH INTRACTABLE MIGRAINE, SO STATED, WITH STATUS MIGRAINOSUS: ICD-10-CM

## 2023-10-20 PROCEDURE — 64615 CHEMODENERV MUSC MIGRAINE: CPT | Performed by: PSYCHIATRY & NEUROLOGY

## 2023-10-20 PROCEDURE — 2500000004 HC RX 250 GENERAL PHARMACY W/ HCPCS (ALT 636 FOR OP/ED): Mod: JZ | Performed by: PSYCHIATRY & NEUROLOGY

## 2023-10-20 PROCEDURE — 2500000004 HC RX 250 GENERAL PHARMACY W/ HCPCS (ALT 636 FOR OP/ED): Performed by: PSYCHIATRY & NEUROLOGY

## 2023-10-20 RX ORDER — KETOROLAC TROMETHAMINE 30 MG/ML
60 INJECTION, SOLUTION INTRAMUSCULAR; INTRAVENOUS ONCE
Status: COMPLETED | OUTPATIENT
Start: 2023-10-20 | End: 2023-10-20

## 2023-10-20 RX ORDER — KETOROLAC TROMETHAMINE 10 MG/1
10 TABLET, FILM COATED ORAL EVERY 6 HOURS
Qty: 20 TABLET | Refills: 0 | Status: SHIPPED | OUTPATIENT
Start: 2023-10-20 | End: 2023-10-25

## 2023-10-20 RX ADMIN — ONABOTULINUMTOXINA 200 UNITS: 100 INJECTION, POWDER, LYOPHILIZED, FOR SOLUTION INTRADERMAL; INTRAMUSCULAR at 13:31

## 2023-10-20 RX ADMIN — KETOROLAC TROMETHAMINE 60 MG: 30 INJECTION, SOLUTION INTRAMUSCULAR; INTRAVENOUS at 10:08

## 2023-10-23 NOTE — PROGRESS NOTES
Patient ID: Florence Albarran is a 47 y.o. female.    Head/Face/Jaw Botulinum Injection    Date/Time: 10/23/2023 6:58 PM    Performed by: Scarlet Munoz MD  Authorized by: Scarlet Munoz MD      Consent:      Consent obtained:  Verbal     Consent given by:  Patient    Procedure details:      EMG used?  No     Electrical stimulation used?  No     Diluted by:  Preservative free saline     Toxin (Brand):  OnaBoNT-A (Botox)     Comments about vials and dilution:  200 spp       Ad hoc region injected:  Head see diagram with 200 units     Total units injected:  200     Total units wasted:  -200    Post-procedure details:      Patient tolerance of procedure:  Tolerated well, no immediate complications    Comments: Please do not rub areas for 24 hours.  No pressure above eyebrows for 24 hours.  Watch out for helmets, headlamps, headbands, goggles, or massage for 24 hours.  If there is discomfort, ice for the first 24 hour,s heat after that.  Headaches may worsen, or you may experience neck stiffness.  If this occurs use your usual headache medication or a mild anti inflammatory such as advil or aleve.  Please call if you have difficulty swallowing.    You received Toradol today for your severe headache.  We are going to continue with 5 day of pills starting tomorrow to break your headache cycle.  Take 1 pill with breakfast lunch dinner and bedtime for 5 days.  Take with food.  Try not to take your triptan or antiinflammatory during this time.  If you have any nausea or diarrhea with the medicine stop and call on the next business day.

## 2023-10-30 DIAGNOSIS — G43.711 CHRONIC MIGRAINE WITHOUT AURA, WITH INTRACTABLE MIGRAINE, SO STATED, WITH STATUS MIGRAINOSUS: ICD-10-CM

## 2023-10-30 RX ORDER — TIZANIDINE HYDROCHLORIDE 4 MG/1
CAPSULE, GELATIN COATED ORAL
Qty: 180 CAPSULE | Refills: 3 | Status: SHIPPED | OUTPATIENT
Start: 2023-10-30 | End: 2024-10-28

## 2023-11-01 ENCOUNTER — SPECIALTY PHARMACY (OUTPATIENT)
Dept: PHARMACY | Facility: CLINIC | Age: 48
End: 2023-11-01

## 2023-11-02 ENCOUNTER — PHARMACY VISIT (OUTPATIENT)
Dept: PHARMACY | Facility: CLINIC | Age: 48
End: 2023-11-02
Payer: COMMERCIAL

## 2023-11-02 PROCEDURE — RXMED WILLOW AMBULATORY MEDICATION CHARGE

## 2023-11-07 ENCOUNTER — PHARMACY VISIT (OUTPATIENT)
Dept: PHARMACY | Facility: CLINIC | Age: 48
End: 2023-11-07
Payer: COMMERCIAL

## 2023-11-07 PROCEDURE — RXMED WILLOW AMBULATORY MEDICATION CHARGE

## 2023-11-08 ENCOUNTER — PHARMACY VISIT (OUTPATIENT)
Dept: PHARMACY | Facility: CLINIC | Age: 48
End: 2023-11-08
Payer: COMMERCIAL

## 2023-11-08 ENCOUNTER — SPECIALTY PHARMACY (OUTPATIENT)
Dept: PHARMACY | Facility: CLINIC | Age: 48
End: 2023-11-08

## 2023-11-08 PROCEDURE — RXMED WILLOW AMBULATORY MEDICATION CHARGE

## 2023-11-09 ENCOUNTER — PHARMACY VISIT (OUTPATIENT)
Dept: PHARMACY | Facility: CLINIC | Age: 48
End: 2023-11-09
Payer: COMMERCIAL

## 2023-11-09 PROCEDURE — RXMED WILLOW AMBULATORY MEDICATION CHARGE

## 2023-11-14 ENCOUNTER — SPECIALTY PHARMACY (OUTPATIENT)
Dept: PHARMACY | Facility: CLINIC | Age: 48
End: 2023-11-14

## 2023-11-20 ENCOUNTER — SPECIALTY PHARMACY (OUTPATIENT)
Dept: PHARMACY | Facility: CLINIC | Age: 48
End: 2023-11-20

## 2023-11-21 ENCOUNTER — PHARMACY VISIT (OUTPATIENT)
Dept: PHARMACY | Facility: CLINIC | Age: 48
End: 2023-11-21
Payer: COMMERCIAL

## 2023-11-21 ENCOUNTER — SPECIALTY PHARMACY (OUTPATIENT)
Dept: PHARMACY | Facility: CLINIC | Age: 48
End: 2023-11-21

## 2023-11-21 PROCEDURE — RXMED WILLOW AMBULATORY MEDICATION CHARGE

## 2023-12-08 PROCEDURE — RXMED WILLOW AMBULATORY MEDICATION CHARGE

## 2023-12-12 ENCOUNTER — PHARMACY VISIT (OUTPATIENT)
Dept: PHARMACY | Facility: CLINIC | Age: 48
End: 2023-12-12
Payer: COMMERCIAL

## 2023-12-12 ENCOUNTER — TELEPHONE (OUTPATIENT)
Dept: NEUROLOGY | Facility: CLINIC | Age: 48
End: 2023-12-12
Payer: COMMERCIAL

## 2023-12-12 NOTE — TELEPHONE ENCOUNTER
Spoke with Florence. She continues to successfully use Nurtec 75mg ODT for migraine treatments as needed. 8 tabs per 30 days. She would like to continue  using this.   She uses Botox and Ajovy for migraine prevention and Nurtec for migraine treatment.

## 2023-12-15 ENCOUNTER — APPOINTMENT (OUTPATIENT)
Dept: NEUROLOGY | Facility: HOSPITAL | Age: 48
End: 2023-12-15
Payer: COMMERCIAL

## 2023-12-15 ENCOUNTER — PHARMACY VISIT (OUTPATIENT)
Dept: PHARMACY | Facility: CLINIC | Age: 48
End: 2023-12-15
Payer: COMMERCIAL

## 2023-12-15 ENCOUNTER — SPECIALTY PHARMACY (OUTPATIENT)
Dept: PHARMACY | Facility: CLINIC | Age: 48
End: 2023-12-15

## 2023-12-15 PROCEDURE — RXMED WILLOW AMBULATORY MEDICATION CHARGE

## 2023-12-18 ENCOUNTER — ANCILLARY PROCEDURE (OUTPATIENT)
Dept: RADIOLOGY | Facility: CLINIC | Age: 48
End: 2023-12-18
Payer: COMMERCIAL

## 2023-12-18 VITALS — BODY MASS INDEX: 20.92 KG/M2 | WEIGHT: 138 LBS | HEIGHT: 68 IN

## 2023-12-18 DIAGNOSIS — Z12.31 ENCOUNTER FOR SCREENING MAMMOGRAM FOR MALIGNANT NEOPLASM OF BREAST: ICD-10-CM

## 2023-12-18 PROCEDURE — RXMED WILLOW AMBULATORY MEDICATION CHARGE

## 2023-12-18 PROCEDURE — 77067 SCR MAMMO BI INCL CAD: CPT

## 2023-12-19 ENCOUNTER — PHARMACY VISIT (OUTPATIENT)
Dept: PHARMACY | Facility: CLINIC | Age: 48
End: 2023-12-19
Payer: COMMERCIAL

## 2023-12-19 RX ORDER — NORETHINDRONE ACETATE AND ETHINYL ESTRADIOL AND FERROUS FUMARATE 1MG-20(24)
KIT ORAL
Qty: 84 TABLET | Refills: 4 | Status: CANCELLED | OUTPATIENT
Start: 2023-12-19 | End: 2024-12-17

## 2023-12-28 DIAGNOSIS — Z30.09 GENERAL COUNSELING AND ADVICE FOR CONTRACEPTIVE MANAGEMENT: Primary | ICD-10-CM

## 2023-12-29 RX ORDER — NORETHINDRONE ACETATE AND ETHINYL ESTRADIOL AND FERROUS FUMARATE 1MG-20(24)
KIT ORAL
Qty: 84 TABLET | Refills: 4 | Status: SHIPPED | OUTPATIENT
Start: 2023-12-29 | End: 2024-03-06 | Stop reason: SDUPTHER

## 2024-01-02 PROCEDURE — RXMED WILLOW AMBULATORY MEDICATION CHARGE

## 2024-01-05 ENCOUNTER — SPECIALTY PHARMACY (OUTPATIENT)
Dept: PHARMACY | Facility: CLINIC | Age: 49
End: 2024-01-05

## 2024-01-09 ENCOUNTER — PHARMACY VISIT (OUTPATIENT)
Dept: PHARMACY | Facility: CLINIC | Age: 49
End: 2024-01-09
Payer: COMMERCIAL

## 2024-01-09 ENCOUNTER — OFFICE VISIT (OUTPATIENT)
Dept: PRIMARY CARE | Facility: CLINIC | Age: 49
End: 2024-01-09
Payer: COMMERCIAL

## 2024-01-09 VITALS
HEIGHT: 69 IN | HEART RATE: 81 BPM | TEMPERATURE: 97.4 F | BODY MASS INDEX: 22.93 KG/M2 | OXYGEN SATURATION: 99 % | WEIGHT: 154.8 LBS | DIASTOLIC BLOOD PRESSURE: 72 MMHG | SYSTOLIC BLOOD PRESSURE: 104 MMHG

## 2024-01-09 DIAGNOSIS — N32.81 OVERACTIVE BLADDER: ICD-10-CM

## 2024-01-09 DIAGNOSIS — R00.2 PALPITATIONS: Primary | ICD-10-CM

## 2024-01-09 DIAGNOSIS — G43.009 MIGRAINE WITHOUT AURA AND WITHOUT STATUS MIGRAINOSUS, NOT INTRACTABLE: ICD-10-CM

## 2024-01-09 PROCEDURE — 99213 OFFICE O/P EST LOW 20 MIN: CPT | Performed by: INTERNAL MEDICINE

## 2024-01-09 PROCEDURE — 1036F TOBACCO NON-USER: CPT | Performed by: INTERNAL MEDICINE

## 2024-01-09 RX ORDER — RIZATRIPTAN BENZOATE 10 MG/1
TABLET, ORALLY DISINTEGRATING ORAL
Qty: 15 TABLET | Refills: 1 | Status: SHIPPED | OUTPATIENT
Start: 2024-01-09 | End: 2024-01-30

## 2024-01-09 NOTE — PROGRESS NOTES
"Subjective   Patient ID: Florence Albarran is a 48 y.o. female who presents for Follow-up.    HPI   Palpitations much improved back on metoprolol. BP normal today too. Not checking BP at home.    Stopped mounjaro over the holidays, gained some weight back. She restarted it last week.    Higher oxybutynin helps with OAB.    Review of Systems    Objective   /72   Pulse 81   Temp 36.3 °C (97.4 °F)   Ht 1.753 m (5' 9\")   Wt 70.2 kg (154 lb 12.8 oz)   LMP  (LMP Unknown)   SpO2 99%   BMI 22.86 kg/m²     Physical Exam  Constitutional:       Appearance: Normal appearance.   Cardiovascular:      Rate and Rhythm: Normal rate and regular rhythm.      Heart sounds: No murmur heard.  Pulmonary:      Effort: Pulmonary effort is normal.      Breath sounds: Normal breath sounds.   Musculoskeletal:      Right lower leg: No edema.      Left lower leg: No edema.   Neurological:      General: No focal deficit present.      Mental Status: She is alert.      Gait: Gait normal.         Assessment/Plan   Problem List Items Addressed This Visit             ICD-10-CM    Migraine G43.909    Relevant Medications    rizatriptan MLT (Maxalt-MLT) 10 mg disintegrating tablet    Overactive bladder N32.81    Palpitations - Primary R00.2          GERD, IBS - Seeing GI. Had normal EGD 3/2022.     Elevated BP, palpitations - continue metoprolol     Migraines - Seeing Neuro     Obesity - On mounjaro through weight loss clinic.     OAB - continue oxybutynin     Health maintenance  -Colonoscopy 8/2022 normal  -Follows with Gyn  -Mammogram - nml 12/2022, ordered at last visit  -Immunizations   -Tdap 2017   -Covid recommended booster   -Flu gets through work     F/u Oct CPE  "

## 2024-01-11 PROCEDURE — RXMED WILLOW AMBULATORY MEDICATION CHARGE

## 2024-01-12 ENCOUNTER — SPECIALTY PHARMACY (OUTPATIENT)
Dept: PHARMACY | Facility: CLINIC | Age: 49
End: 2024-01-12

## 2024-01-13 ENCOUNTER — SPECIALTY PHARMACY (OUTPATIENT)
Dept: PHARMACY | Facility: CLINIC | Age: 49
End: 2024-01-13

## 2024-01-13 PROCEDURE — RXMED WILLOW AMBULATORY MEDICATION CHARGE

## 2024-01-15 ENCOUNTER — PHARMACY VISIT (OUTPATIENT)
Dept: PHARMACY | Facility: CLINIC | Age: 49
End: 2024-01-15
Payer: COMMERCIAL

## 2024-01-15 ENCOUNTER — SPECIALTY PHARMACY (OUTPATIENT)
Dept: PHARMACY | Facility: CLINIC | Age: 49
End: 2024-01-15

## 2024-01-15 DIAGNOSIS — R11.2 NAUSEA AND VOMITING, UNSPECIFIED VOMITING TYPE: ICD-10-CM

## 2024-01-15 PROCEDURE — RXMED WILLOW AMBULATORY MEDICATION CHARGE

## 2024-01-15 RX ORDER — ONDANSETRON HYDROCHLORIDE 8 MG/1
TABLET, FILM COATED ORAL
Qty: 30 TABLET | Refills: 6 | Status: SHIPPED | OUTPATIENT
Start: 2024-01-15 | End: 2025-01-14

## 2024-01-19 ENCOUNTER — APPOINTMENT (OUTPATIENT)
Dept: NEUROLOGY | Facility: HOSPITAL | Age: 49
End: 2024-01-19
Payer: COMMERCIAL

## 2024-01-26 ENCOUNTER — APPOINTMENT (OUTPATIENT)
Dept: NEUROLOGY | Facility: HOSPITAL | Age: 49
End: 2024-01-26
Payer: COMMERCIAL

## 2024-01-30 ENCOUNTER — OFFICE VISIT (OUTPATIENT)
Dept: OBSTETRICS AND GYNECOLOGY | Facility: CLINIC | Age: 49
End: 2024-01-30
Payer: COMMERCIAL

## 2024-01-30 VITALS
BODY MASS INDEX: 21.62 KG/M2 | DIASTOLIC BLOOD PRESSURE: 64 MMHG | SYSTOLIC BLOOD PRESSURE: 102 MMHG | HEIGHT: 69 IN | WEIGHT: 146 LBS

## 2024-01-30 DIAGNOSIS — N95.1 MENOPAUSAL SYNDROME ON HORMONE REPLACEMENT THERAPY: ICD-10-CM

## 2024-01-30 DIAGNOSIS — G43.009 MIGRAINE WITHOUT AURA AND WITHOUT STATUS MIGRAINOSUS, NOT INTRACTABLE: ICD-10-CM

## 2024-01-30 DIAGNOSIS — Z79.890 MENOPAUSAL SYNDROME ON HORMONE REPLACEMENT THERAPY: ICD-10-CM

## 2024-01-30 DIAGNOSIS — Z01.419 WELL WOMAN EXAM WITH ROUTINE GYNECOLOGICAL EXAM: Primary | ICD-10-CM

## 2024-01-30 PROCEDURE — 1036F TOBACCO NON-USER: CPT | Performed by: NURSE PRACTITIONER

## 2024-01-30 PROCEDURE — 99396 PREV VISIT EST AGE 40-64: CPT | Performed by: NURSE PRACTITIONER

## 2024-01-30 RX ORDER — RIZATRIPTAN BENZOATE 10 MG/1
TABLET, ORALLY DISINTEGRATING ORAL
Qty: 15 TABLET | Refills: 0 | Status: SHIPPED | OUTPATIENT
Start: 2024-01-30 | End: 2024-02-16 | Stop reason: SDUPTHER

## 2024-01-30 RX ORDER — ESTRADIOL 1 MG/1
1 TABLET ORAL DAILY
Qty: 30 TABLET | Refills: 11 | Status: SHIPPED | OUTPATIENT
Start: 2024-01-30 | End: 2024-03-06 | Stop reason: SINTOL

## 2024-01-30 RX ORDER — PROGESTERONE 100 MG/1
100 CAPSULE ORAL DAILY
Qty: 30 CAPSULE | Refills: 11 | Status: SHIPPED | OUTPATIENT
Start: 2024-01-30 | End: 2024-03-06 | Stop reason: SINTOL

## 2024-01-30 ASSESSMENT — ENCOUNTER SYMPTOMS
CARDIOVASCULAR NEGATIVE: 0
CONSTITUTIONAL NEGATIVE: 0
EYES NEGATIVE: 0
PSYCHIATRIC NEGATIVE: 0
MUSCULOSKELETAL NEGATIVE: 0
NEUROLOGICAL NEGATIVE: 0
GASTROINTESTINAL NEGATIVE: 0
ENDOCRINE NEGATIVE: 0
HEMATOLOGIC/LYMPHATIC NEGATIVE: 0
SLEEP DISTURBANCE: 1
ALLERGIC/IMMUNOLOGIC NEGATIVE: 0
RESPIRATORY NEGATIVE: 0

## 2024-01-30 NOTE — PROGRESS NOTES
Subjective   Patient ID: Florence Albarran is a 48 y.o. female who presents for Annual Exam and Menopause (Hot flashes at night).  HPI Patient reports she is having hot flashes at night, night sweats, brain fog and some vaginal dryness. She reports having estrogen cream , but not using the cream at this time, denies pelvic pain , pressure, and brain fog. She reports she was just started on metoprolol for  hypertension and oxybutynin for urinary urgency . She otherwise feels fine she is taking COCP which helps with migraines , she reports still getting some migraines but overall has noticed an improvement.    LMP: amenorrhea d/t continuos cocp  menarche: 14     Contraception: cocp  happy with it?: yes, continuos helps with her migraines     H/O of STI: genital warts  requesting sti testing? 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 yes  exercise: no  seatbelt: yes  alcohol use: none  tobacco use: no  drug use: no  calcium intake: adequate     FH of breast cancer: maternal aunt  FH of ovarian cancer: no  FH of colon cancer: no  FH of pancreatic cancer: no        Review of Systems   Genitourinary:  Positive for urgency.        Stress incontinence, vaginal dryness    Neurological:         Intermittent Migraines    Psychiatric/Behavioral:  Positive for sleep disturbance.        Objective   Physical Exam  Cardiovascular:      Rate and Rhythm: Normal rate and regular rhythm.      Heart sounds: Normal heart sounds.   Pulmonary:      Effort: Pulmonary effort is normal.      Breath sounds: Normal breath sounds.   Chest:   Breasts:     Right: Normal.      Left: Normal.   Genitourinary:     Comments: Vaginal dryness present during vaginal exam        Assessment/Plan   Diagnoses and all orders for this  visit:  Well woman exam with routine gynecological exam  Menopausal syndrome on hormone replacement therapy  -     estradiol (Estrace) 1 mg tablet; Take 1 tablet (1 mg) by mouth once daily.  -     progesterone (Prometrium) 100 mg capsule; Take 1 capsule (100 mg) by mouth once daily. Take at bedtime    Plan - Will start patient on oral Estradiol  1 mg and Progesterone 100 mg , will stop COCP at this time due to patient having hypertension , if migraines start to come back more frequent will discontinue hormonal therapy and restart on COCP, patient ok with plan will and instructed to call with any concerns.       Isaac Arroyo RN 01/30/24 8:12 AM

## 2024-01-30 NOTE — PROGRESS NOTES
Subjective   Patient ID: Florence Albarran is a 48 y.o. female who presents for Annual Exam and Menopause (Hot flashes at night).  HPI  Concerns:   LMP: amenorrhea d/t continuos cocp  menarche: 14     Contraception: cocp  happy with it?: yes, continuos helps with her migraines     H/O of STI: genital warts  requesting sti testing? 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 yes  exercise: yes  seatbelt: yes  alcohol use: none  tobacco use: no  drug use: no  calcium intake: adequate     FH of breast cancer: no  FH of ovarian cancer: no  FH of colon cancer: no  FH of pancreatic cancer: no        Review of Systems    Objective   Physical Exam    Assessment/Plan   {Assess/PlanSmartLinks:45053}         VERÓNIAC Ruiz-CNP 01/30/24 8:07 AM

## 2024-02-02 ENCOUNTER — APPOINTMENT (OUTPATIENT)
Dept: NEUROLOGY | Facility: HOSPITAL | Age: 49
End: 2024-02-02
Payer: COMMERCIAL

## 2024-02-02 ENCOUNTER — PHARMACY VISIT (OUTPATIENT)
Dept: PHARMACY | Facility: CLINIC | Age: 49
End: 2024-02-02
Payer: COMMERCIAL

## 2024-02-02 PROCEDURE — RXMED WILLOW AMBULATORY MEDICATION CHARGE

## 2024-02-05 PROCEDURE — RXMED WILLOW AMBULATORY MEDICATION CHARGE

## 2024-02-06 ENCOUNTER — SPECIALTY PHARMACY (OUTPATIENT)
Dept: PHARMACY | Facility: CLINIC | Age: 49
End: 2024-02-06

## 2024-02-06 PROCEDURE — RXMED WILLOW AMBULATORY MEDICATION CHARGE

## 2024-02-07 ENCOUNTER — SPECIALTY PHARMACY (OUTPATIENT)
Dept: PHARMACY | Facility: CLINIC | Age: 49
End: 2024-02-07

## 2024-02-07 DIAGNOSIS — G43.711 CHRONIC MIGRAINE WITHOUT AURA, WITH INTRACTABLE MIGRAINE, SO STATED, WITH STATUS MIGRAINOSUS: ICD-10-CM

## 2024-02-07 PROCEDURE — RXMED WILLOW AMBULATORY MEDICATION CHARGE

## 2024-02-07 RX ORDER — BACLOFEN 10 MG/1
TABLET ORAL
Qty: 90 TABLET | Refills: 6 | Status: SHIPPED | OUTPATIENT
Start: 2024-02-07 | End: 2025-02-06

## 2024-02-08 ENCOUNTER — PHARMACY VISIT (OUTPATIENT)
Dept: PHARMACY | Facility: CLINIC | Age: 49
End: 2024-02-08
Payer: COMMERCIAL

## 2024-02-09 ENCOUNTER — PHARMACY VISIT (OUTPATIENT)
Dept: PHARMACY | Facility: CLINIC | Age: 49
End: 2024-02-09
Payer: COMMERCIAL

## 2024-02-16 ENCOUNTER — PROCEDURE VISIT (OUTPATIENT)
Dept: NEUROLOGY | Facility: HOSPITAL | Age: 49
End: 2024-02-16
Payer: COMMERCIAL

## 2024-02-16 VITALS
WEIGHT: 150 LBS | SYSTOLIC BLOOD PRESSURE: 111 MMHG | HEIGHT: 69 IN | HEART RATE: 84 BPM | RESPIRATION RATE: 18 BRPM | DIASTOLIC BLOOD PRESSURE: 69 MMHG | BODY MASS INDEX: 22.22 KG/M2 | TEMPERATURE: 97.1 F

## 2024-02-16 DIAGNOSIS — G43.009 MIGRAINE WITHOUT AURA AND WITHOUT STATUS MIGRAINOSUS, NOT INTRACTABLE: ICD-10-CM

## 2024-02-16 DIAGNOSIS — G43.711 CHRONIC MIGRAINE WITHOUT AURA, WITH INTRACTABLE MIGRAINE, SO STATED, WITH STATUS MIGRAINOSUS: ICD-10-CM

## 2024-02-16 DIAGNOSIS — G43.709 CHRONIC MIGRAINE WITHOUT AURA WITHOUT STATUS MIGRAINOSUS, NOT INTRACTABLE: Primary | ICD-10-CM

## 2024-02-16 PROCEDURE — 64615 CHEMODENERV MUSC MIGRAINE: CPT | Performed by: PSYCHIATRY & NEUROLOGY

## 2024-02-16 PROCEDURE — 96372 THER/PROPH/DIAG INJ SC/IM: CPT | Performed by: PSYCHIATRY & NEUROLOGY

## 2024-02-16 PROCEDURE — 2500000004 HC RX 250 GENERAL PHARMACY W/ HCPCS (ALT 636 FOR OP/ED): Performed by: PSYCHIATRY & NEUROLOGY

## 2024-02-16 PROCEDURE — 2500000004 HC RX 250 GENERAL PHARMACY W/ HCPCS (ALT 636 FOR OP/ED): Mod: JZ | Performed by: PSYCHIATRY & NEUROLOGY

## 2024-02-16 RX ORDER — KETOROLAC TROMETHAMINE 10 MG/1
10 TABLET, FILM COATED ORAL EVERY 6 HOURS
Qty: 20 TABLET | Refills: 0 | Status: SHIPPED | OUTPATIENT
Start: 2024-02-16 | End: 2024-02-21

## 2024-02-16 RX ORDER — METHOCARBAMOL 500 MG/1
TABLET, FILM COATED ORAL
COMMUNITY

## 2024-02-16 RX ORDER — KETOROLAC TROMETHAMINE 30 MG/ML
60 INJECTION, SOLUTION INTRAMUSCULAR; INTRAVENOUS ONCE
Status: COMPLETED | OUTPATIENT
Start: 2024-02-16 | End: 2024-02-16

## 2024-02-16 RX ORDER — RIZATRIPTAN BENZOATE 10 MG/1
TABLET, ORALLY DISINTEGRATING ORAL
Qty: 15 TABLET | Refills: 6 | Status: SHIPPED | OUTPATIENT
Start: 2024-02-16 | End: 2024-03-29 | Stop reason: SDUPTHER

## 2024-02-16 RX ADMIN — KETOROLAC TROMETHAMINE 60 MG: 30 INJECTION, SOLUTION INTRAMUSCULAR at 10:00

## 2024-02-16 NOTE — PROGRESS NOTES
Patient ID: Florence Albarran is a 48 y.o. female.    Head/Face/Jaw Botulinum Injection    Date/Time: 2/22/2024 3:34 PM    Performed by: Scarlet Munoz MD  Authorized by: Scarlet Munoz MD      Consent:      Consent obtained:  Verbal     Consent given by:  Patient    Procedure details:      EMG used?  No     Electrical stimulation used?  No     Diluted by:  Preservative free saline     Toxin (Brand):  OnaBoNT-A (Botox)     Comments about vials and dilution:  Spp     Total units available:  200       Ad hoc region injected:  Head see diagram with 200 units     Total units injected:  200     Total units wasted:  0    Post-procedure details:      Patient tolerance of procedure:  Tolerated well, no immediate complications    Comments: Please do not rub areas for 24 hours.  No pressure above eyebrows for 24 hours.  Watch out for helmets, headlamps, headbands, goggles, or massage for 24 hours.  If there is discomfort, ice for the first 24 hour,s heat after that.  Headaches may worsen, or you may experience neck stiffness.  If this occurs use your usual headache medication or a mild anti inflammatory such as advil or aleve.  Please call if you have difficulty swallowing.    You received Toradol today for your severe headache.  We are going to continue with 5 day of pills starting tomorrow to break your headache cycle.  Take 1 pill with breakfast lunch dinner and bedtime for 5 days.  Take with food.  Try not to take your triptan or antiinflammatory during this time.  If you have any nausea or diarrhea with the medicine stop and call on the next business day.   Toradol IM after Botox to prevent injection triggered Migraines

## 2024-02-22 RX ADMIN — ONABOTULINUMTOXINA 200 UNITS: 100 INJECTION, POWDER, LYOPHILIZED, FOR SOLUTION INTRADERMAL; INTRAMUSCULAR at 15:32

## 2024-03-04 ENCOUNTER — SPECIALTY PHARMACY (OUTPATIENT)
Dept: PHARMACY | Facility: CLINIC | Age: 49
End: 2024-03-04

## 2024-03-04 PROCEDURE — RXMED WILLOW AMBULATORY MEDICATION CHARGE

## 2024-03-06 DIAGNOSIS — Z30.09 GENERAL COUNSELING AND ADVICE FOR CONTRACEPTIVE MANAGEMENT: ICD-10-CM

## 2024-03-06 RX ORDER — NORETHINDRONE ACETATE AND ETHINYL ESTRADIOL AND FERROUS FUMARATE 1MG-20(24)
KIT ORAL
Qty: 84 TABLET | Refills: 4 | Status: SHIPPED | OUTPATIENT
Start: 2024-03-06 | End: 2025-03-05

## 2024-03-08 PROCEDURE — RXMED WILLOW AMBULATORY MEDICATION CHARGE

## 2024-03-18 ENCOUNTER — PATIENT MESSAGE (OUTPATIENT)
Dept: PRIMARY CARE | Facility: CLINIC | Age: 49
End: 2024-03-18
Payer: COMMERCIAL

## 2024-03-18 DIAGNOSIS — N30.00 ACUTE CYSTITIS WITHOUT HEMATURIA: Primary | ICD-10-CM

## 2024-03-18 RX ORDER — NITROFURANTOIN 25; 75 MG/1; MG/1
100 CAPSULE ORAL 2 TIMES DAILY
Qty: 10 CAPSULE | Refills: 0 | Status: SHIPPED | OUTPATIENT
Start: 2024-03-18 | End: 2024-03-23

## 2024-03-20 ENCOUNTER — PHARMACY VISIT (OUTPATIENT)
Dept: PHARMACY | Facility: CLINIC | Age: 49
End: 2024-03-20
Payer: COMMERCIAL

## 2024-03-29 ENCOUNTER — TELEPHONE (OUTPATIENT)
Dept: NEUROLOGY | Facility: CLINIC | Age: 49
End: 2024-03-29
Payer: COMMERCIAL

## 2024-03-29 DIAGNOSIS — G43.009 MIGRAINE WITHOUT AURA AND WITHOUT STATUS MIGRAINOSUS, NOT INTRACTABLE: ICD-10-CM

## 2024-03-29 DIAGNOSIS — G43.711 CHRONIC MIGRAINE WITHOUT AURA, WITH INTRACTABLE MIGRAINE, SO STATED, WITH STATUS MIGRAINOSUS: ICD-10-CM

## 2024-03-29 NOTE — TELEPHONE ENCOUNTER
Intractable migraine headaches. Home abortives are not breaking the cycle. Medrol dose vahid Ordered per MD protocol  Pt instr on use and side effect. No other rescue meds during Medrol to break any other overuse cycle. Pt states understanding.   Would like 6 week taper as in past to completely break cycle. [place] tapers do not completely break cycle for her.

## 2024-03-30 RX ORDER — METHYLPREDNISOLONE 4 MG/1
TABLET ORAL
Qty: 147 TABLET | Refills: 0 | Status: SHIPPED | OUTPATIENT
Start: 2024-03-30

## 2024-03-30 RX ORDER — RIZATRIPTAN BENZOATE 10 MG/1
TABLET, ORALLY DISINTEGRATING ORAL
Qty: 15 TABLET | Refills: 6 | Status: SHIPPED | OUTPATIENT
Start: 2024-03-30

## 2024-04-01 ENCOUNTER — SPECIALTY PHARMACY (OUTPATIENT)
Dept: PHARMACY | Facility: CLINIC | Age: 49
End: 2024-04-01

## 2024-04-01 PROCEDURE — RXMED WILLOW AMBULATORY MEDICATION CHARGE

## 2024-04-02 ENCOUNTER — PHARMACY VISIT (OUTPATIENT)
Dept: PHARMACY | Facility: CLINIC | Age: 49
End: 2024-04-02
Payer: COMMERCIAL

## 2024-04-12 ENCOUNTER — PHARMACY VISIT (OUTPATIENT)
Dept: PHARMACY | Facility: CLINIC | Age: 49
End: 2024-04-12
Payer: COMMERCIAL

## 2024-04-12 PROCEDURE — RXMED WILLOW AMBULATORY MEDICATION CHARGE

## 2024-04-15 PROCEDURE — RXMED WILLOW AMBULATORY MEDICATION CHARGE

## 2024-04-16 ENCOUNTER — PHARMACY VISIT (OUTPATIENT)
Dept: PHARMACY | Facility: CLINIC | Age: 49
End: 2024-04-16
Payer: COMMERCIAL

## 2024-04-17 ENCOUNTER — SPECIALTY PHARMACY (OUTPATIENT)
Dept: PHARMACY | Facility: CLINIC | Age: 49
End: 2024-04-17

## 2024-04-19 DIAGNOSIS — G43.711 CHRONIC MIGRAINE WITHOUT AURA, INTRACTABLE, WITH STATUS MIGRAINOSUS: Primary | ICD-10-CM

## 2024-04-22 PROCEDURE — RXMED WILLOW AMBULATORY MEDICATION CHARGE

## 2024-05-06 ENCOUNTER — PHARMACY VISIT (OUTPATIENT)
Dept: PHARMACY | Facility: CLINIC | Age: 49
End: 2024-05-06
Payer: COMMERCIAL

## 2024-05-06 PROCEDURE — RXMED WILLOW AMBULATORY MEDICATION CHARGE

## 2024-05-07 ENCOUNTER — SPECIALTY PHARMACY (OUTPATIENT)
Dept: PHARMACY | Facility: CLINIC | Age: 49
End: 2024-05-07

## 2024-05-10 ENCOUNTER — PHARMACY VISIT (OUTPATIENT)
Dept: PHARMACY | Facility: CLINIC | Age: 49
End: 2024-05-10
Payer: COMMERCIAL

## 2024-05-13 ENCOUNTER — PATIENT MESSAGE (OUTPATIENT)
Dept: PRIMARY CARE | Facility: CLINIC | Age: 49
End: 2024-05-13
Payer: COMMERCIAL

## 2024-05-13 DIAGNOSIS — R10.12 LEFT UPPER QUADRANT ABDOMINAL PAIN: Primary | ICD-10-CM

## 2024-05-13 PROCEDURE — RXMED WILLOW AMBULATORY MEDICATION CHARGE

## 2024-05-14 ENCOUNTER — PHARMACY VISIT (OUTPATIENT)
Dept: PHARMACY | Facility: CLINIC | Age: 49
End: 2024-05-14
Payer: COMMERCIAL

## 2024-05-17 ENCOUNTER — APPOINTMENT (OUTPATIENT)
Dept: NEUROLOGY | Facility: HOSPITAL | Age: 49
End: 2024-05-17
Payer: COMMERCIAL

## 2024-05-22 ENCOUNTER — SPECIALTY PHARMACY (OUTPATIENT)
Dept: PHARMACY | Facility: CLINIC | Age: 49
End: 2024-05-22

## 2024-05-22 ENCOUNTER — PATIENT MESSAGE (OUTPATIENT)
Dept: PRIMARY CARE | Facility: CLINIC | Age: 49
End: 2024-05-22

## 2024-05-22 ENCOUNTER — TELEPHONE (OUTPATIENT)
Dept: NEUROLOGY | Facility: CLINIC | Age: 49
End: 2024-05-22

## 2024-05-22 ENCOUNTER — LAB (OUTPATIENT)
Dept: LAB | Facility: LAB | Age: 49
End: 2024-05-22
Payer: COMMERCIAL

## 2024-05-22 DIAGNOSIS — R10.12 LEFT UPPER QUADRANT ABDOMINAL PAIN: ICD-10-CM

## 2024-05-22 LAB
ALBUMIN SERPL BCP-MCNC: 3.6 G/DL (ref 3.4–5)
ALP SERPL-CCNC: 46 U/L (ref 33–110)
ALT SERPL W P-5'-P-CCNC: 6 U/L (ref 7–45)
ANION GAP SERPL CALC-SCNC: 9 MMOL/L (ref 10–20)
AST SERPL W P-5'-P-CCNC: 11 U/L (ref 9–39)
BILIRUB SERPL-MCNC: 0.5 MG/DL (ref 0–1.2)
BUN SERPL-MCNC: 12 MG/DL (ref 6–23)
CALCIUM SERPL-MCNC: 9 MG/DL (ref 8.6–10.6)
CHLORIDE SERPL-SCNC: 107 MMOL/L (ref 98–107)
CO2 SERPL-SCNC: 28 MMOL/L (ref 21–32)
CREAT SERPL-MCNC: 0.72 MG/DL (ref 0.5–1.05)
EGFRCR SERPLBLD CKD-EPI 2021: >90 ML/MIN/1.73M*2
ERYTHROCYTE [DISTWIDTH] IN BLOOD BY AUTOMATED COUNT: 12.8 % (ref 11.5–14.5)
GLUCOSE SERPL-MCNC: 93 MG/DL (ref 74–99)
HCT VFR BLD AUTO: 43.5 % (ref 36–46)
HGB BLD-MCNC: 13.9 G/DL (ref 12–16)
MCH RBC QN AUTO: 31.5 PG (ref 26–34)
MCHC RBC AUTO-ENTMCNC: 32 G/DL (ref 32–36)
MCV RBC AUTO: 99 FL (ref 80–100)
NRBC BLD-RTO: 0 /100 WBCS (ref 0–0)
PLATELET # BLD AUTO: 287 X10*3/UL (ref 150–450)
POTASSIUM SERPL-SCNC: 4.4 MMOL/L (ref 3.5–5.3)
PROT SERPL-MCNC: 6.3 G/DL (ref 6.4–8.2)
RBC # BLD AUTO: 4.41 X10*6/UL (ref 4–5.2)
SODIUM SERPL-SCNC: 140 MMOL/L (ref 136–145)
TSH SERPL-ACNC: 1.42 MIU/L (ref 0.44–3.98)
WBC # BLD AUTO: 6.6 X10*3/UL (ref 4.4–11.3)

## 2024-05-22 PROCEDURE — 80053 COMPREHEN METABOLIC PANEL: CPT

## 2024-05-22 PROCEDURE — 36415 COLL VENOUS BLD VENIPUNCTURE: CPT

## 2024-05-22 PROCEDURE — 85027 COMPLETE CBC AUTOMATED: CPT

## 2024-05-22 PROCEDURE — 84443 ASSAY THYROID STIM HORMONE: CPT

## 2024-05-22 NOTE — PROGRESS NOTES
[facility] Clinical Note    Florence Albarran is a 48 y.o. female, who is on the specialty pharmacy service of: [facility].  Florence Albarran is taking: Ajovy.     Florence was contacted on 5/22/2024.    Refer to the encounter summary report for documentation details about patient counseling and education.      Impression/Plan  Is patient high risk? (potential patients:  pregnancy, geriatric, pediatric) No    Is laboratory follow-up needed? No  Is a clinical intervention needed? No  Next assessment date? 8/22/24  Additional comments:    Medication Adherence  The importance of adherence was discussed with the patient and they were advised to take the medication as prescribed by their provider. Florence was encouraged to call her physician's office if they have a question regarding a missed dose.     QOL/Patient Satisfaction  Rate your quality of life on scale of 1-10: 9  Rate your satisfaction with  Specialty Pharmacy on scale of 1-10: 10 - Completely satisfied      Patient advised to contact the pharmacy if there are any changes to her medication list, including prescriptions, OTC medications, herbal products, or supplements. Patient was advised of [facility]’s dispensing process, refill timeline, contact information ([phone]), and patient management follow up. Patient confirmed understanding of education conducted during assessment. All patient questions and concerns were addressed to the best of my ability. Patient was encouraged to contact the specialty pharmacy with any questions or concerns.    Confirmed follow-up outreaches are properly scheduled. Reviewed goals of therapy in the program targets.    Damaris A Weiland, CalliD

## 2024-05-22 NOTE — TELEPHONE ENCOUNTER
Intractable severe migraine. Finished long steroid taper not long ago. Worked well at higher doses. Migraine slowly returned at lower tapering doses.   Will come in 5-24-24 for Toradol injection to break cycle.   Advised she needs to schedule a follow up to discuss other options. States understanding.

## 2024-05-31 ENCOUNTER — PROCEDURE VISIT (OUTPATIENT)
Dept: NEUROLOGY | Facility: HOSPITAL | Age: 49
End: 2024-05-31
Payer: COMMERCIAL

## 2024-05-31 ENCOUNTER — OFFICE VISIT (OUTPATIENT)
Dept: UROLOGY | Facility: CLINIC | Age: 49
End: 2024-05-31
Payer: COMMERCIAL

## 2024-05-31 VITALS
HEART RATE: 94 BPM | HEIGHT: 68 IN | TEMPERATURE: 97.1 F | DIASTOLIC BLOOD PRESSURE: 75 MMHG | BODY MASS INDEX: 23.19 KG/M2 | SYSTOLIC BLOOD PRESSURE: 124 MMHG | RESPIRATION RATE: 18 BRPM | WEIGHT: 153 LBS

## 2024-05-31 VITALS — BODY MASS INDEX: 23.26 KG/M2 | TEMPERATURE: 97.8 F | HEIGHT: 68 IN

## 2024-05-31 DIAGNOSIS — G43.711 INTRACTABLE CHRONIC MIGRAINE WITHOUT AURA AND WITH STATUS MIGRAINOSUS: Primary | ICD-10-CM

## 2024-05-31 DIAGNOSIS — G43.709 CHRONIC MIGRAINE WITHOUT AURA WITHOUT STATUS MIGRAINOSUS, NOT INTRACTABLE: ICD-10-CM

## 2024-05-31 DIAGNOSIS — N20.0 KIDNEY STONES: Primary | ICD-10-CM

## 2024-05-31 DIAGNOSIS — R10.9 FLANK PAIN: ICD-10-CM

## 2024-05-31 LAB
POC APPEARANCE, URINE: CLEAR
POC BILIRUBIN, URINE: NEGATIVE
POC BLOOD, URINE: ABNORMAL
POC COLOR, URINE: YELLOW
POC GLUCOSE, URINE: NEGATIVE MG/DL
POC KETONES, URINE: NEGATIVE MG/DL
POC LEUKOCYTES, URINE: NEGATIVE
POC NITRITE,URINE: NEGATIVE
POC PH, URINE: 5.5 PH
POC PROTEIN, URINE: NEGATIVE MG/DL
POC SPECIFIC GRAVITY, URINE: 1.02
POC UROBILINOGEN, URINE: 0.2 EU/DL

## 2024-05-31 PROCEDURE — 1036F TOBACCO NON-USER: CPT | Performed by: UROLOGY

## 2024-05-31 PROCEDURE — 87086 URINE CULTURE/COLONY COUNT: CPT

## 2024-05-31 PROCEDURE — 64615 CHEMODENERV MUSC MIGRAINE: CPT | Performed by: PSYCHIATRY & NEUROLOGY

## 2024-05-31 PROCEDURE — 96372 THER/PROPH/DIAG INJ SC/IM: CPT | Performed by: PSYCHIATRY & NEUROLOGY

## 2024-05-31 PROCEDURE — 81002 URINALYSIS NONAUTO W/O SCOPE: CPT | Performed by: UROLOGY

## 2024-05-31 PROCEDURE — 2500000004 HC RX 250 GENERAL PHARMACY W/ HCPCS (ALT 636 FOR OP/ED): Performed by: PSYCHIATRY & NEUROLOGY

## 2024-05-31 PROCEDURE — 99204 OFFICE O/P NEW MOD 45 MIN: CPT | Performed by: UROLOGY

## 2024-05-31 RX ORDER — GABAPENTIN 600 MG/1
600 TABLET ORAL DAILY
Qty: 30 TABLET | Refills: 1 | Status: SHIPPED | OUTPATIENT
Start: 2024-05-31

## 2024-05-31 RX ORDER — KETOROLAC TROMETHAMINE 30 MG/ML
60 INJECTION, SOLUTION INTRAMUSCULAR; INTRAVENOUS ONCE
Status: COMPLETED | OUTPATIENT
Start: 2024-05-31 | End: 2024-05-31

## 2024-05-31 RX ORDER — KETOROLAC TROMETHAMINE 10 MG/1
10 TABLET, FILM COATED ORAL EVERY 6 HOURS
Qty: 20 TABLET | Refills: 0 | Status: SHIPPED | OUTPATIENT
Start: 2024-05-31 | End: 2024-06-05

## 2024-05-31 RX ADMIN — KETOROLAC TROMETHAMINE 60 MG: 60 INJECTION, SOLUTION INTRAMUSCULAR at 13:58

## 2024-05-31 RX ADMIN — ONABOTULINUMTOXINA 200 UNITS: 100 INJECTION, POWDER, LYOPHILIZED, FOR SOLUTION INTRADERMAL; INTRAMUSCULAR at 15:50

## 2024-05-31 ASSESSMENT — PAIN SCALES - GENERAL
PAINLEVEL: 0-NO PAIN
PAINLEVEL: 0-NO PAIN

## 2024-05-31 NOTE — PROGRESS NOTES
Scribed for Dr. Aggie Jonas by Serafin Lane.  I, Dr. Aggie Jonas, have personally reviewed and agreed with the information entered by the Virtual Scribe. 05/31/24    History of Present Illness (HPI):  TODAY: (05/31/24)  Florence Albarran is a 48 y.o. female with history of nephrolithiasis. Referred by her PCP for recurring flank discomfort and urinary symptoms in setting of negative cultures.     Presents as a new patient for an evaluation.  Acknowledges recent h/o flank pain, similar to stone-pain in the past.   Presently, pain well-controlled, denies any recent fevers, chills or gross hematuria.     Labs and imaging personally reviewed:  UA positive for 3-5 RBC's, 6-10 WBC's, 2+ calcium oxalate. (10/09/23)  UA positive for 0 RBC's, 6-10 WBC's, 3+ calcium oxalate. (10/14/23)  Urine cultures negative. (10/09, 10/14/23)    Abdominal XR negative for acute pathology. (10/09/24)    CT A&P (08/29/22)  1.  2 mm stone within the midpole of the left kidney and 4 mm stone  within the inferior pole of the left kidney are no longer visualized  on current examination compared to prior examination dated  04/22/2022, indicating interval passage. No evidence of radiopaque  stone within the ureters bilaterally.  2. Similar appearance of 2 mm stone within the inferior pole of the  right kidney and 3 mm stone within the midpole of the left kidney. No  evidence of hydroureteronephrosis bilaterally.  3. 2 mm fecalith within the appendix without evidence of acute  appendicitis.    Past Medical History:   Diagnosis Date    Acute atopic conjunctivitis, unspecified eye 12/20/2017    Allergic conjunctivitis    Acute candidiasis of vulva and vagina 04/02/2015    Vaginal yeast infection    Anogenital (venereal) warts 02/24/2016    Genital warts    Anogenital (venereal) warts 01/20/2016    Genital warts    Anogenital (venereal) warts 11/18/2015    Genital warts    Body mass index (BMI) 27.0-27.9, adult 09/19/2018    BMI 27.0-27.9,adult     Calculus of kidney 11/10/2017    Kidney stone on left side    Cervicalgia 06/01/2018    Cervicalgia of byokjrfk-cqvgglv-vppdc region    Cervicalgia 03/16/2018    Cervicalgia of jvafetdh-ovxnmqw-pzeyh region    Cervicalgia 07/20/2018    Cervicalgia of ccvszmlu-cwsatbd-cbihp region    Cervicalgia 05/08/2018    Cervicalgia of qjpemazl-gkiryoh-lkwms region    Cervicalgia 08/10/2018    Cervicalgia of xzvhnslc-zqiwxhk-yckjq region    Chronic migraine without aura, intractable, with status migrainosus 06/01/2018    Chronic migraine without aura, with intractable migraine, so stated, with status migrainosus    Chronic migraine without aura, intractable, with status migrainosus 07/20/2018    Chronic migraine without aura, with intractable migraine, so stated, with status migrainosus    Chronic migraine without aura, intractable, with status migrainosus 05/08/2018    Chronic migraine without aura, with intractable migraine, so stated, with status migrainosus    Chronic migraine without aura, intractable, with status migrainosus 08/10/2018    Chronic migraine without aura, with intractable migraine, so stated, with status migrainosus    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 10/05/2018    Vitamin B 12 deficiency    Encounter for gynecological examination (general) (routine) without abnormal findings 11/10/2017    Well woman exam with routine gynecological exam    Encounter for other preprocedural examination 07/29/2019    Preoperative clearance    Encounter for screening for infections with a predominantly sexual mode of transmission 06/01/2018    Screening for STDs (sexually transmitted diseases)    Encounter for screening for infections with a predominantly sexual mode of transmission 06/01/2018    Routine screening for STI (sexually transmitted infection)    Encounter for screening for infections with a predominantly sexual mode of transmission 07/20/2018    Screening for STDs (sexually transmitted diseases)     Encounter for screening for infections with a predominantly sexual mode of transmission 07/20/2018    Routine screening for STI (sexually transmitted infection)    Encounter for screening for infections with a predominantly sexual mode of transmission 05/08/2018    Screening for STDs (sexually transmitted diseases)    Encounter for screening for infections with a predominantly sexual mode of transmission 05/08/2018    Routine screening for STI (sexually transmitted infection)    Encounter for screening for infections with a predominantly sexual mode of transmission 08/10/2018    Screening for STDs (sexually transmitted diseases)    Encounter for screening for infections with a predominantly sexual mode of transmission 08/10/2018    Routine screening for STI (sexually transmitted infection)    Encounter for screening for infections with a predominantly sexual mode of transmission     Screening for STDs (sexually transmitted diseases)    Encounter for screening for infections with a predominantly sexual mode of transmission 11/10/2017    Routine screening for STI (sexually transmitted infection)    Encounter for screening for malignant neoplasm of colon 11/05/2017    Colon cancer screening    Encounter for screening for malignant neoplasm of colon 09/26/2018    Colon cancer screening    Encounter for screening mammogram for malignant neoplasm of breast 06/01/2018    Encounter for screening mammogram for breast cancer    Encounter for screening mammogram for malignant neoplasm of breast 07/20/2018    Encounter for screening mammogram for breast cancer    Encounter for screening mammogram for malignant neoplasm of breast 05/08/2018    Encounter for screening mammogram for breast cancer    Encounter for screening mammogram for malignant neoplasm of breast 08/10/2018    Encounter for screening mammogram for breast cancer    Fever, unspecified 09/10/2018    Febrile illness    Left upper quadrant pain 06/16/2020    Colicky  LUQ abdominal pain    Meibomian gland dysfunction of right eye, unspecified eyelid 08/10/2018    Meibomian gland dysfunction (MGD) of right eye    Migraine, unspecified, not intractable, without status migrainosus 08/10/2018    Migraine headache    Ocular pain, left eye 09/28/2018    Ocular pain, left eye    Ocular pain, right eye 09/28/2018    Ocular pain, right eye    Other specified noninflammatory disorders of vulva and perineum 02/24/2016    Vulvar lesion    Pain disorder with related psychological factors 10/01/2018    Pain disorder associated with psychological and physical factors    Pain in unspecified shoulder 06/04/2021    Shoulder pain    Personal history of diseases of the skin and subcutaneous tissue 09/18/2017    History of cellulitis    Personal history of other diseases of the digestive system 03/28/2017    History of constipation    Personal history of other diseases of the respiratory system 09/10/2018    History of pharyngitis    Personal history of other diseases of the respiratory system 09/11/2018    History of sinusitis    Personal history of other infectious and parasitic diseases 02/26/2016    History of genital warts    Personal history of other specified conditions 10/05/2018    History of nausea    Personal history of other specified conditions 03/28/2017    History of epigastric pain    Personal history of other specified conditions 10/23/2017    History of dysuria    Personal history of other specified conditions 09/19/2018    History of epigastric pain    Personal history of other specified conditions 09/03/2019    History of palpitations    Personal history of other specified conditions 12/20/2017    History of palpitations    Personal history of urinary (tract) infections 05/29/2015    History of urinary tract infection    Personal history of urinary (tract) infections 04/20/2017    History of urinary tract infection    Personal history of urinary calculi 12/08/2017    History of  renal calculi    Unspecified condition associated with female genital organs and menstrual cycle 02/24/2016    Genital lesion, female    Urinary tract infection, site not specified 04/16/2019    Acute UTI    Urinary tract infection, site not specified 04/16/2021    Acute UTI    Urinary tract infection, site not specified 09/11/2018    Acute UTI     Past Surgical History:   Procedure Laterality Date    DILATION AND CURETTAGE OF UTERUS  06/15/2015    Dilation And Curettage Of Cervical Stump    LAPAROSCOPY DIAGNOSTIC / BIOPSY / ASPIRATION / LYSIS  06/15/2015    Exploratory Laparoscopy    MR HEAD ANGIO WO IV CONTRAST  11/17/2019    MR HEAD ANGIO WO IV CONTRAST 11/17/2019 [facility]    MR NECK ANGIO WO IV CONTRAST  11/17/2019    MR NECK ANGIO WO IV CONTRAST 11/17/2019 [facility]     Family History   Problem Relation Name Age of Onset    Lymphoma Mother      No Known Problems Father       Social History     Tobacco Use   Smoking Status Never   Smokeless Tobacco Never     Current Outpatient Medications   Medication Sig Dispense Refill    aspirin-acetaminophen-caffeine (Excedrin Extra Strength) 250-250-65 mg tablet Take 2 tablets by mouth once daily.      baclofen (Lioresal) 10 mg tablet TAKE ONE (1) TABLET BY MOUTH 3 TIMES DAILY AS NEEDED FOR MUSCLE SPASM. 90 tablet 6    cyanocobalamin (Vitamin B-12) 1,000 mcg/mL injection Additional Instructions: INJECT 1ML INTRAMUSCULARLY WEEKLY. x 4 injections then monthly      fremanezumab (Ajovy) 225 mg/1.5 mL auto-injector INJECT 225 MG (1 PEN) UNDER THE SKIN ONCE A MONTH AS DIRECTED. 4.5 mL 2    gabapentin (Neurontin) 600 mg tablet Take 1 tablet (600 mg) by mouth once daily.      latanoprost (Xalatan) 0.005 % ophthalmic solution Administer 1 drop into affected eye(s) twice a day.      lifitegrast 5 % dropperette INSTILL 1 DROP INTO BOTH EYES TWICE DAILY. 60 each 11    loteprednol (Lotemax) 0.5 % ophthalmic suspension INSTILL 1 TO 2 DROPS THREE TIMES DAILY TO BOTH  EYES. 5 mL 6    methocarbamol (Robaxin) 500 mg tablet take 1 to 2 tablets by mouth at bedtime      methylPREDNISolone (MedroL) 4 mg tablet 6 tabs for 1 week; 5 tabs for 1 week; 4 tabs for 1 week; 3 tabs for 1 week; 2 tabs for 1 week; 1 tab for 1 week and stop 147 tablet 0    metoprolol succinate XL (Toprol-XL) 50 mg 24 hr tablet Take 1 tablet (50 mg) by mouth once daily. Do not crush or chew. 90 tablet 3    norethindrone-e.estradioL-iron (Veronique 24 Fe) 1 mg-20 mcg(24) /75 mg (4) tablet,chewable TAKE 1 TABLET BY MOUTH DAILY, SKIPPING THE PLACEBO PILLS 84 tablet 4    omeprazole (PriLOSEC) 20 mg DR capsule Take 1 capsule (20 mg) by mouth once daily in the morning. Take before meals. 90 capsule 3    onabotulinumtoxinA (Botox) 100 unit injection PHYSICIAN TO INJECT 200 UNITS INTRAMUSCULARLY TO HEAD AND NECK EVERY 90 DAYS 2 each 3    ondansetron (Zofran) 8 mg tablet TAKE ONE (1) TABLET BY MOUTH EVERY 8 HOURS AS NEEDED. 30 tablet 6    oxybutynin XL (Ditropan-XL) 10 mg 24 hr tablet Take 1 tablet (10 mg) by mouth once daily. 90 tablet 3    plecanatide (Trulance) tablet tablet Take 1 tablet (3 mg) by mouth once daily.      rimegepant (NURTEC) 75 mg tablet,disintegrating ALLOW 1 TABLET TO DISSOLVE ON OR UNDER THE TONGUE ONCE DAILY AS NEEDED FOR MIGRAINE. DO NOT EXCEED 1 TABLET PER 24 HOURS. 8 tablet 6    rizatriptan MLT (Maxalt-MLT) 10 mg disintegrating tablet allow 1 tablet by mouth to dissolve on or under the tongue at the onset of headache. may repeat every 2 hours as needed. maximum of 3 tablets in 24 hours 15 tablet 6    tirzepatide (Mounjaro) 5 mg/0.5 mL pen injector Inject 5 mg under the skin every 7 days.      tiZANidine (Zanaflex) 4 mg capsule TAKE TWO (2) CAPSULES BY MOUTH AT BEDTIME WITH FOOD 180 capsule 3    triamcinolone (Kenalog) 0.1 % cream       ubrogepant (Ubrelvy) 100 mg tablet tablet Take 1 tablet (100 mg) by mouth 1 time if needed (migraine).  MAY REPEAT IN 2 HOURS. MAX DOSE 2 TABS IN 24 HOURS.       No  current facility-administered medications for this visit.     No Known Allergies  Past medical, surgical, family and social history in the chart was reviewed and is accurate including any additions to what is in this HPI.    Review of systems (ROS):   Pertinent information as listed in the HPI.        Objective   There were no vitals taken for this visit.  Physical Exam:  Constitutional: NAD  HEENT: AT/NC  Resp: Non labored respirations.  Skin: No jaundice or visible skin lesions.  Neuro: No focal deficits.  Psych: Appropriate mood and affect.    Lab Review:  Lab Results   Component Value Date    WBC 6.6 05/22/2024    RBC 4.41 05/22/2024    HGB 13.9 05/22/2024    HCT 43.5 05/22/2024     05/22/2024      Lab Results   Component Value Date    BUN 12 05/22/2024    CREATININE 0.72 05/22/2024      Lab Results   Component Value Date    HGBA1C 4.9 05/04/2023     Lab Results   Component Value Date    CHOL 188 10/09/2023    TRIG 110 10/09/2023    HDL 57.0 10/09/2023    ALT 6 (L) 05/22/2024    AST 11 05/22/2024     05/22/2024    K 4.4 05/22/2024     05/22/2024    CO2 28 05/22/2024    TSH 1.42 05/22/2024    INR 1.0 09/09/2019    GLUF 83 09/19/2018        ASSESSMENT:  Problem List Items Addressed This Visit    None     PLAN:  1. Nephrolithiasis  2. Flank pain  3. Microscopic hematuria    Denotes recent h/o acute flank pain, presently well-controlled.   Similar to prior stone-related discomfort in the past.   Opts to proceed with a STAT CT stone protocol to address stone presence/burden.   Prescribed toradol PRN for pain management.   Send urine for culture, treat based on C+S.   Follow up next week to review CT results.     All questions were answered to the patient’s satisfaction.  Patient agrees with the plan and wishes to proceed.  Continue follow-up for ongoing care of her chronic medical conditions.    Scribed for Dr. Aggie Jonas by Serafin Lane.  I, Dr. Aggie Jonas, have personally reviewed and agreed  with the information entered by the Virtual Scribe. 05/31/24

## 2024-05-31 NOTE — PROGRESS NOTES
Patient ID: Florence Albarran is a 48 y.o. female.    Head/Face/Jaw Botulinum Injection    Date/Time: 5/31/2024 3:50 PM    Performed by: Scarlet Munoz MD  Authorized by: Scarlet Munoz MD      Consent:      Consent obtained:  Verbal     Consent given by:  Patient    Procedure details:      EMG used?  No     Electrical stimulation used?  No     Diluted by:  Preservative free saline     Toxin (Brand):  OnaBoNT-A (Botox)     Comments about vials and dilution:  Spp     Total units available:  200       Ad hoc region injected:  Head see diagram with 200 units     Total units injected:  200     Total units wasted:  0    Post-procedure details:      Patient tolerance of procedure:  Tolerated well, no immediate complications    Comments: Please do not rub areas for 24 hours.  No pressure above eyebrows for 24 hours.  Watch out for helmets, headlamps, headbands, goggles, or massage for 24 hours.  If there is discomfort, ice for the first 24 hour,s heat after that.  Headaches may worsen, or you may experience neck stiffness.  If this occurs use your usual headache medication or a mild anti inflammatory such as advil or aleve.  Please call if you have difficulty swallowing.    You received Toradol today for your severe headache.  We are going to continue with 5 day of pills starting tomorrow to break your headache cycle.  Take 1 pill with breakfast lunch dinner and bedtime for 5 days.  Take with food.  Try not to take your triptan or antiinflammatory during this time.  If you have any nausea or diarrhea with the medicine stop and call on the next business day.

## 2024-06-02 LAB — BACTERIA UR CULT: NO GROWTH

## 2024-06-06 PROBLEM — N80.9 ENDOMETRIOSIS: Status: ACTIVE | Noted: 2024-06-06

## 2024-06-06 PROBLEM — E66.3 OVERWEIGHT WITH BODY MASS INDEX (BMI) 25.0-29.9: Status: ACTIVE | Noted: 2023-08-30

## 2024-06-06 PROBLEM — K21.9 GASTROESOPHAGEAL REFLUX DISEASE: Status: ACTIVE | Noted: 2024-06-06

## 2024-06-06 PROBLEM — F43.29 ADJUSTMENT DISORDER WITH MIXED EMOTIONAL FEATURES: Status: ACTIVE | Noted: 2024-06-06

## 2024-06-06 PROBLEM — E83.59 CALCIUM OXALATE CALCULUS: Status: ACTIVE | Noted: 2024-06-06

## 2024-06-06 PROBLEM — H02.883 MEIBOMIAN GLAND DYSFUNCTION (MGD) OF RIGHT EYE: Status: ACTIVE | Noted: 2023-08-30

## 2024-06-06 PROBLEM — K58.0 IRRITABLE BOWEL SYNDROME WITH DIARRHEA: Status: ACTIVE | Noted: 2023-08-30

## 2024-06-06 PROBLEM — J06.9 UPPER RESPIRATORY TRACT INFECTION: Status: ACTIVE | Noted: 2023-06-10

## 2024-06-06 PROBLEM — H10.10 ALLERGIC CONJUNCTIVITIS: Status: ACTIVE | Noted: 2024-06-06

## 2024-06-06 PROBLEM — E86.0 DEHYDRATION: Status: ACTIVE | Noted: 2017-11-13

## 2024-06-07 ENCOUNTER — APPOINTMENT (OUTPATIENT)
Dept: NEUROLOGY | Facility: HOSPITAL | Age: 49
End: 2024-06-07
Payer: COMMERCIAL

## 2024-06-11 ENCOUNTER — HOSPITAL ENCOUNTER (OUTPATIENT)
Dept: RADIOLOGY | Facility: CLINIC | Age: 49
Discharge: HOME | End: 2024-06-11
Payer: COMMERCIAL

## 2024-06-11 ENCOUNTER — OFFICE VISIT (OUTPATIENT)
Dept: ORTHOPEDIC SURGERY | Facility: CLINIC | Age: 49
End: 2024-06-11
Payer: COMMERCIAL

## 2024-06-11 VITALS — WEIGHT: 152 LBS | BODY MASS INDEX: 23.11 KG/M2

## 2024-06-11 DIAGNOSIS — M25.561 RIGHT KNEE PAIN, UNSPECIFIED CHRONICITY: Primary | ICD-10-CM

## 2024-06-11 DIAGNOSIS — M25.569 KNEE PAIN: ICD-10-CM

## 2024-06-11 DIAGNOSIS — M17.11 PRIMARY OSTEOARTHRITIS OF RIGHT KNEE: ICD-10-CM

## 2024-06-11 PROCEDURE — 99213 OFFICE O/P EST LOW 20 MIN: CPT | Mod: 25 | Performed by: PHYSICIAN ASSISTANT

## 2024-06-11 PROCEDURE — 1036F TOBACCO NON-USER: CPT | Performed by: PHYSICIAN ASSISTANT

## 2024-06-11 PROCEDURE — 2500000005 HC RX 250 GENERAL PHARMACY W/O HCPCS: Performed by: PHYSICIAN ASSISTANT

## 2024-06-11 PROCEDURE — 73560 X-RAY EXAM OF KNEE 1 OR 2: CPT | Mod: RIGHT SIDE | Performed by: ORTHOPAEDIC SURGERY

## 2024-06-11 PROCEDURE — 2500000004 HC RX 250 GENERAL PHARMACY W/ HCPCS (ALT 636 FOR OP/ED): Performed by: PHYSICIAN ASSISTANT

## 2024-06-11 PROCEDURE — 20610 DRAIN/INJ JOINT/BURSA W/O US: CPT | Performed by: PHYSICIAN ASSISTANT

## 2024-06-11 PROCEDURE — RXMED WILLOW AMBULATORY MEDICATION CHARGE

## 2024-06-11 PROCEDURE — 99213 OFFICE O/P EST LOW 20 MIN: CPT | Performed by: PHYSICIAN ASSISTANT

## 2024-06-11 PROCEDURE — 73560 X-RAY EXAM OF KNEE 1 OR 2: CPT | Mod: RT

## 2024-06-11 RX ORDER — LIDOCAINE HYDROCHLORIDE 10 MG/ML
1 INJECTION INFILTRATION; PERINEURAL
Status: COMPLETED | OUTPATIENT
Start: 2024-06-11 | End: 2024-06-11

## 2024-06-11 RX ORDER — TRIAMCINOLONE ACETONIDE 40 MG/ML
40 INJECTION, SUSPENSION INTRA-ARTICULAR; INTRAMUSCULAR
Status: COMPLETED | OUTPATIENT
Start: 2024-06-11 | End: 2024-06-11

## 2024-06-11 RX ADMIN — LIDOCAINE HYDROCHLORIDE 1 ML: 10 INJECTION, SOLUTION INFILTRATION; PERINEURAL at 08:51

## 2024-06-11 RX ADMIN — TRIAMCINOLONE ACETONIDE 40 MG: 40 INJECTION, SUSPENSION INTRA-ARTICULAR; INTRAMUSCULAR at 08:51

## 2024-06-11 ASSESSMENT — PATIENT HEALTH QUESTIONNAIRE - PHQ9
2. FEELING DOWN, DEPRESSED OR HOPELESS: NOT AT ALL
1. LITTLE INTEREST OR PLEASURE IN DOING THINGS: NOT AT ALL
SUM OF ALL RESPONSES TO PHQ9 QUESTIONS 1 AND 2: 0

## 2024-06-11 ASSESSMENT — PAIN - FUNCTIONAL ASSESSMENT: PAIN_FUNCTIONAL_ASSESSMENT: 0-10

## 2024-06-11 ASSESSMENT — ENCOUNTER SYMPTOMS
LOSS OF SENSATION IN FEET: 0
DEPRESSION: 0
OCCASIONAL FEELINGS OF UNSTEADINESS: 0

## 2024-06-11 ASSESSMENT — PAIN DESCRIPTION - DESCRIPTORS: DESCRIPTORS: ACHING

## 2024-06-11 ASSESSMENT — LIFESTYLE VARIABLES: TOTAL SCORE: 0

## 2024-06-11 ASSESSMENT — PAIN SCALES - GENERAL: PAINLEVEL_OUTOF10: 8

## 2024-06-11 NOTE — PROGRESS NOTES
Subjective      Chief Complaint   Patient presents with    Right Knee - Pain       HPI  This 48 year old patient presents today with  right knee pain. The patient states that this right knee pain has been present for years. She completed a series of gel injections over a year ago with excellent relief of the right knee pain. However, the right knee pain has recurred. There has been no recent trauma . The patient rates the knee pain as 8. The patient states that knee pain is worse with and aggravated by activity and bearing weight. The patient denies instability. The patient has tried ibuprofen and tylenol with no relief. Patient requests a discussion of further treatment options on examination today.     CARDIOLOGY:   Negative for chest pain, shortness of breath.   RESPIRATORY:   Negative for chest pain, shortness of breath.   MUSCULOSKELETAL:   See HPI for details.   NEUROLOGY:   Negative for tingling, numbness, weakness.    Objective    There were no vitals filed for this visit.    Knee Exam  Constitutional: Appears stated age. No apparent distress  Labored Breathing: No  Psychiatric: Normal mood and effect.   Neurological: alert and oriented x3  Skin: intact  MUSCULOSKELETAL: Neck: No tenderness. No pain or limitation with range of motion. Back: No tenderness. Straight leg test negative bilaterally. right knee: There is diffuse tenderness over the knee at the medial joint line. full range of motion McMurrays is negative. Anterior drawer and lachmans are negative. There is not an effusion present. The knee is stable to valgus and varus stressing. The patient walks with a painful gait favoring the right knee while walking.    Standing AP x-rays of both knees and lateral x-rays of the right knee done and read in office today show osteoarthritis of the right knee.     Patient ID: Florence Albarran is a 48 y.o. female.    L Inj/Asp: R knee on 6/11/2024 8:51 AM  Indications: pain  Details: 22 G needle, medial  approach  Medications: 40 mg triamcinolone acetonide 40 mg/mL; 1 mL lidocaine 10 mg/mL (1 %)  Outcome: tolerated well, no immediate complications  Procedure, treatment alternatives, risks and benefits explained, specific risks discussed. Immediately prior to procedure a time out was called to verify the correct patient, procedure, equipment, support staff and site/side marked as required. Patient was prepped and draped in the usual sterile fashion.           Florence was seen today for pain.  Diagnoses and all orders for this visit:  Right knee pain, unspecified chronicity (Primary)  Primary osteoarthritis of right knee  Other orders  -     L Inj/Asp  Options are discussed with the patient in detail. The patient is instructed regarding activity modification, ice, provider directed at home gentle strengthening and ROM exercises, and the appropriate use of Tylenol as needed for pain with its potential adverse reactions and side effects. The patient understands. The patient states that despite all the treatment listed above that this right knee pain is debilitating and  requests a discussion of further options. Cortisone injection to the right knee is discussed in the office today. This is done in the office today. See procedures below. Return in 4 weeks for consideration of gel injections or sooner as needed, Please note that this report has been produced using speech recognition software.  It may contain errors related to grammar, punctuation or spelling.  Electronically signed, but not reviewed.    Bell Chaney PA-C

## 2024-06-11 NOTE — PATIENT INSTRUCTIONS
Thank you for coming to see us today!     Continue to use tylenol for pain control.   Rest, ice and elevate and remember to do exercises like walking, biking.     Follow up  4-6 weeks to discuss gel

## 2024-06-12 ENCOUNTER — PHARMACY VISIT (OUTPATIENT)
Dept: PHARMACY | Facility: CLINIC | Age: 49
End: 2024-06-12
Payer: COMMERCIAL

## 2024-06-14 ENCOUNTER — HOSPITAL ENCOUNTER (OUTPATIENT)
Dept: RADIOLOGY | Facility: CLINIC | Age: 49
Discharge: HOME | End: 2024-06-14
Payer: COMMERCIAL

## 2024-06-14 DIAGNOSIS — N20.0 KIDNEY STONES: ICD-10-CM

## 2024-06-14 DIAGNOSIS — R10.9 FLANK PAIN: ICD-10-CM

## 2024-06-14 PROCEDURE — 74176 CT ABD & PELVIS W/O CONTRAST: CPT

## 2024-06-19 ENCOUNTER — APPOINTMENT (OUTPATIENT)
Dept: UROLOGY | Facility: CLINIC | Age: 49
End: 2024-06-19
Payer: COMMERCIAL

## 2024-06-19 DIAGNOSIS — R31.29 MICROSCOPIC HEMATURIA: ICD-10-CM

## 2024-06-19 DIAGNOSIS — N20.0 KIDNEY STONES: ICD-10-CM

## 2024-06-19 DIAGNOSIS — Z79.2 NEED FOR PROPHYLACTIC ANTIBIOTIC: ICD-10-CM

## 2024-06-19 PROCEDURE — 99214 OFFICE O/P EST MOD 30 MIN: CPT | Performed by: UROLOGY

## 2024-06-19 RX ORDER — CEPHALEXIN 500 MG/1
500 CAPSULE ORAL ONCE
Qty: 1 CAPSULE | Refills: 0 | Status: SHIPPED | OUTPATIENT
Start: 2024-06-19 | End: 2024-06-19

## 2024-06-19 NOTE — PROGRESS NOTES
Subjective     This visit was completed via telemedicine. All issues as below were discussed and addressed but no physical exam was performed unless allowed by visual confirmation. If it was felt that the patient should be evaluated in clinic, then they were directed there. Patient verbally consented to visit.      Florence Albarran is a 48 y.o. female  with history of nephrolithiasis, microscopic hematuria and flank pain. Presenting today to review CT A&P. Patient reports significant urinary urgency, frequency and urge incontinence. Her PCP prescribed her oxybuytinin with no improvement in her symptoms. Denies any recent gross hematuria, fevers, chills, urinary retention, intractable flank or abdominal pain, nausea or vomiting.            Past Medical History:   Diagnosis Date    Acute atopic conjunctivitis, unspecified eye 12/20/2017    Allergic conjunctivitis    Acute candidiasis of vulva and vagina 04/02/2015    Vaginal yeast infection    Anogenital (venereal) warts 02/24/2016    Genital warts    Anogenital (venereal) warts 01/20/2016    Genital warts    Anogenital (venereal) warts 11/18/2015    Genital warts    Body mass index (BMI) 27.0-27.9, adult 09/19/2018    BMI 27.0-27.9,adult    Calculus of kidney 11/10/2017    Kidney stone on left side    Cervicalgia 06/01/2018    Cervicalgia of lumbndkg-letudsw-pwvti region    Cervicalgia 03/16/2018    Cervicalgia of qtykkwoq-umnxffz-vemwo region    Cervicalgia 07/20/2018    Cervicalgia of stiezyyk-ntmbwyn-gitvv region    Cervicalgia 05/08/2018    Cervicalgia of fkotntyn-hrdcnme-rmacs region    Cervicalgia 08/10/2018    Cervicalgia of npxyrwek-ajzgjkn-qfyxe region    Chronic migraine without aura, intractable, with status migrainosus 06/01/2018    Chronic migraine without aura, with intractable migraine, so stated, with status migrainosus    Chronic migraine without aura, intractable, with status migrainosus 07/20/2018    Chronic migraine without aura, with intractable  migraine, so stated, with status migrainosus    Chronic migraine without aura, intractable, with status migrainosus 05/08/2018    Chronic migraine without aura, with intractable migraine, so stated, with status migrainosus    Chronic migraine without aura, intractable, with status migrainosus 08/10/2018    Chronic migraine without aura, with intractable migraine, so stated, with status migrainosus    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 10/05/2018    Vitamin B 12 deficiency    Encounter for gynecological examination (general) (routine) without abnormal findings 11/10/2017    Well woman exam with routine gynecological exam    Encounter for other preprocedural examination 07/29/2019    Preoperative clearance    Encounter for screening for infections with a predominantly sexual mode of transmission 06/01/2018    Screening for STDs (sexually transmitted diseases)    Encounter for screening for infections with a predominantly sexual mode of transmission 06/01/2018    Routine screening for STI (sexually transmitted infection)    Encounter for screening for infections with a predominantly sexual mode of transmission 07/20/2018    Screening for STDs (sexually transmitted diseases)    Encounter for screening for infections with a predominantly sexual mode of transmission 07/20/2018    Routine screening for STI (sexually transmitted infection)    Encounter for screening for infections with a predominantly sexual mode of transmission 05/08/2018    Screening for STDs (sexually transmitted diseases)    Encounter for screening for infections with a predominantly sexual mode of transmission 05/08/2018    Routine screening for STI (sexually transmitted infection)    Encounter for screening for infections with a predominantly sexual mode of transmission 08/10/2018    Screening for STDs (sexually transmitted diseases)    Encounter for screening for infections with a predominantly sexual mode of transmission 08/10/2018     Routine screening for STI (sexually transmitted infection)    Encounter for screening for infections with a predominantly sexual mode of transmission     Screening for STDs (sexually transmitted diseases)    Encounter for screening for infections with a predominantly sexual mode of transmission 11/10/2017    Routine screening for STI (sexually transmitted infection)    Encounter for screening for malignant neoplasm of colon 11/05/2017    Colon cancer screening    Encounter for screening for malignant neoplasm of colon 09/26/2018    Colon cancer screening    Encounter for screening mammogram for malignant neoplasm of breast 06/01/2018    Encounter for screening mammogram for breast cancer    Encounter for screening mammogram for malignant neoplasm of breast 07/20/2018    Encounter for screening mammogram for breast cancer    Encounter for screening mammogram for malignant neoplasm of breast 05/08/2018    Encounter for screening mammogram for breast cancer    Encounter for screening mammogram for malignant neoplasm of breast 08/10/2018    Encounter for screening mammogram for breast cancer    Fever, unspecified 09/10/2018    Febrile illness    Left upper quadrant pain 06/16/2020    Colicky LUQ abdominal pain    Meibomian gland dysfunction of right eye, unspecified eyelid 08/10/2018    Meibomian gland dysfunction (MGD) of right eye    Migraine, unspecified, not intractable, without status migrainosus 08/10/2018    Migraine headache    Ocular pain, left eye 09/28/2018    Ocular pain, left eye    Ocular pain, right eye 09/28/2018    Ocular pain, right eye    Other specified noninflammatory disorders of vulva and perineum 02/24/2016    Vulvar lesion    Pain disorder with related psychological factors 10/01/2018    Pain disorder associated with psychological and physical factors    Pain in unspecified shoulder 06/04/2021    Shoulder pain    Personal history of diseases of the skin and subcutaneous tissue 09/18/2017     History of cellulitis    Personal history of other diseases of the digestive system 03/28/2017    History of constipation    Personal history of other diseases of the respiratory system 09/10/2018    History of pharyngitis    Personal history of other diseases of the respiratory system 09/11/2018    History of sinusitis    Personal history of other infectious and parasitic diseases 02/26/2016    History of genital warts    Personal history of other specified conditions 10/05/2018    History of nausea    Personal history of other specified conditions 03/28/2017    History of epigastric pain    Personal history of other specified conditions 10/23/2017    History of dysuria    Personal history of other specified conditions 09/19/2018    History of epigastric pain    Personal history of other specified conditions 09/03/2019    History of palpitations    Personal history of other specified conditions 12/20/2017    History of palpitations    Personal history of urinary (tract) infections 05/29/2015    History of urinary tract infection    Personal history of urinary (tract) infections 04/20/2017    History of urinary tract infection    Personal history of urinary calculi 12/08/2017    History of renal calculi    Right knee pain     Unspecified condition associated with female genital organs and menstrual cycle 02/24/2016    Genital lesion, female    Urinary tract infection, site not specified 04/16/2019    Acute UTI    Urinary tract infection, site not specified 04/16/2021    Acute UTI    Urinary tract infection, site not specified 09/11/2018    Acute UTI     Past Surgical History:   Procedure Laterality Date    DILATION AND CURETTAGE OF UTERUS  06/15/2015    Dilation And Curettage Of Cervical Stump    LAPAROSCOPY DIAGNOSTIC / BIOPSY / ASPIRATION / LYSIS  06/15/2015    Exploratory Laparoscopy    MR HEAD ANGIO WO IV CONTRAST  11/17/2019    MR HEAD ANGIO WO IV CONTRAST 11/17/2019 [facility]    MR NECK ANGIO WO IV  CONTRAST  11/17/2019    MR NECK ANGIO WO IV CONTRAST 11/17/2019 [facility]     Family History   Problem Relation Name Age of Onset    Lymphoma Mother      No Known Problems Father       Current Outpatient Medications   Medication Sig Dispense Refill    aspirin-acetaminophen-caffeine (Excedrin Extra Strength) 250-250-65 mg tablet Take 2 tablets by mouth once daily.      baclofen (Lioresal) 10 mg tablet TAKE ONE (1) TABLET BY MOUTH 3 TIMES DAILY AS NEEDED FOR MUSCLE SPASM. 90 tablet 6    cephalexin (Keflex) 500 mg capsule Take 1 capsule (500 mg) by mouth 1 time for 1 dose. Take 1 capsule by mouth 1 hour prior to cystoscopy 1 capsule 0    cyanocobalamin (Vitamin B-12) 1,000 mcg/mL injection Additional Instructions: INJECT 1ML INTRAMUSCULARLY WEEKLY. x 4 injections then monthly      fremanezumab (Ajovy) 225 mg/1.5 mL auto-injector INJECT 225 MG (1 PEN) UNDER THE SKIN ONCE A MONTH AS DIRECTED. 4.5 mL 2    gabapentin (Neurontin) 600 mg tablet Take 1 tablet (600 mg) by mouth once daily. 30 tablet 1    latanoprost (Xalatan) 0.005 % ophthalmic solution Administer 1 drop into affected eye(s) twice a day.      lifitegrast 5 % dropperette INSTILL 1 DROP INTO BOTH EYES TWICE DAILY. 60 each 11    loteprednol (Lotemax) 0.5 % ophthalmic suspension INSTILL 1 TO 2 DROPS THREE TIMES DAILY TO BOTH EYES. 5 mL 6    methocarbamol (Robaxin) 500 mg tablet take 1 to 2 tablets by mouth at bedtime      methylPREDNISolone (MedroL) 4 mg tablet 6 tabs for 1 week; 5 tabs for 1 week; 4 tabs for 1 week; 3 tabs for 1 week; 2 tabs for 1 week; 1 tab for 1 week and stop (Patient not taking: Reported on 6/11/2024) 147 tablet 0    metoprolol succinate XL (Toprol-XL) 50 mg 24 hr tablet Take 1 tablet (50 mg) by mouth once daily. Do not crush or chew. 90 tablet 3    norethindrone-e.estradioL-iron (Veronique 24 Fe) 1 mg-20 mcg(24) /75 mg (4) tablet,chewable TAKE 1 TABLET BY MOUTH DAILY, SKIPPING THE PLACEBO PILLS 84 tablet 4    omeprazole (PriLOSEC)  20 mg DR capsule Take 1 capsule (20 mg) by mouth once daily in the morning. Take before meals. 90 capsule 3    onabotulinumtoxinA (Botox) 100 unit injection PHYSICIAN TO INJECT 200 UNITS INTRAMUSCULARLY TO HEAD AND NECK EVERY 90 DAYS 2 each 3    ondansetron (Zofran) 8 mg tablet TAKE ONE (1) TABLET BY MOUTH EVERY 8 HOURS AS NEEDED. 30 tablet 6    oxybutynin XL (Ditropan-XL) 10 mg 24 hr tablet Take 1 tablet (10 mg) by mouth once daily. 90 tablet 3    plecanatide (Trulance) tablet tablet Take 1 tablet (3 mg) by mouth once daily.      rimegepant (NURTEC) 75 mg tablet,disintegrating ALLOW 1 TABLET TO DISSOLVE ON OR UNDER THE TONGUE ONCE DAILY AS NEEDED FOR MIGRAINE. DO NOT EXCEED 1 TABLET PER 24 HOURS. 8 tablet 6    rizatriptan MLT (Maxalt-MLT) 10 mg disintegrating tablet allow 1 tablet by mouth to dissolve on or under the tongue at the onset of headache. may repeat every 2 hours as needed. maximum of 3 tablets in 24 hours 15 tablet 6    tirzepatide (Mounjaro) 5 mg/0.5 mL pen injector Inject 5 mg under the skin every 7 days.      tiZANidine (Zanaflex) 4 mg capsule TAKE TWO (2) CAPSULES BY MOUTH AT BEDTIME WITH FOOD 180 capsule 3    triamcinolone (Kenalog) 0.1 % cream       ubrogepant (Ubrelvy) 100 mg tablet tablet Take 1 tablet (100 mg) by mouth 1 time if needed (migraine).  MAY REPEAT IN 2 HOURS. MAX DOSE 2 TABS IN 24 HOURS.       No current facility-administered medications for this visit.     No Known Allergies  Social History     Socioeconomic History    Marital status:      Spouse name: Not on file    Number of children: Not on file    Years of education: Not on file    Highest education level: Not on file   Occupational History    Not on file   Tobacco Use    Smoking status: Never    Smokeless tobacco: Never   Vaping Use    Vaping status: Never Used   Substance and Sexual Activity    Alcohol use: Yes    Drug use: Never    Sexual activity: Defer   Other Topics Concern    Not on file   Social History  "Narrative    Not on file     Social Determinants of Health     Financial Resource Strain: Not on file   Food Insecurity: Not on file   Transportation Needs: Not on file   Physical Activity: Not on file   Stress: Not on file   Social Connections: Not on file   Intimate Partner Violence: Not on file   Housing Stability: Not on file       Review of Systems  Pertinent items are noted in HPI.    Objective       Lab Review  Lab Results   Component Value Date    WBC 6.6 05/22/2024    RBC 4.41 05/22/2024    HGB 13.9 05/22/2024    HCT 43.5 05/22/2024     05/22/2024      Lab Results   Component Value Date    BUN 12 05/22/2024    CREATININE 0.72 05/22/2024      No results found for: \"PSA\"        Assessment/Plan   Diagnoses and all orders for this visit:  Kidney stones  -     US renal complete; Future  Microscopic hematuria  -     Cystourethroscopy; Future  Need for prophylactic antibiotic  -     cephalexin (Keflex) 500 mg capsule; Take 1 capsule (500 mg) by mouth 1 time for 1 dose. Take 1 capsule by mouth 1 hour prior to cystoscopy    Nephrolithiasis     I reviewed CT A&P from 6/14/2024 which showed nonobstructive left-sided nephrolithiasis. No hydronephrosis. No signs of obstructive uropathy.    We discussed that most calculi under 5 mm can be safely observed. This recommendation is based on large series looking at spontaneous passage rates that suggest that the likelihood of stone passage is highest for stones under 4 mm in size (approximately 70-80%), moderate for stones between 4 and 6 mm (50%), and lowest for stones 6 mm or greater (20%-30%).      However, stones under 5 mm that are in a solitary kidney, associated with infection or causing significant obstruction should be removed to prevent loss of renal function and/or sepsis. Also, every patient has different anatomy and different ability to pass calculi and tolerate pain, so intervention is also recommended in patients with small stones that are in significant " discomfort or that have a history of being unable to pass small calculi. Patient understands and desires to proceed.    We will follow up annually with renal US to monitor stone presence and formation.     Microscopic hematuria       We will proceed with cystoscopy to complete evaluation of microscopic hematuria.     The risks of cystoscopy were discussed with the patient in great detail, including risk of hematuria, UTI and discomfort. Antibiotic will be prescribed to be taken 1 hour prior to the procedure. Patient agrees to proceed.     3. Urinary frequency      We will give patient a trial of Gemtesa when she presents for her cystoscopy.     We discussed the risks of Gemtesa which include incomplete bladder emptying, sinus pain, dry mouth, sore throat, joint pain, diarrhea, constipation, bloating, and anxiety.       All questions were answered to the patient's satisfaction. Patient agrees with the plan and wishes to proceed. Follow-up will be scheduled appropriately.       Scribed for Dr. Jonas by Shanique Mayes. I , Dr Jonas, have personally reviewed and agreed with the information entered by the Virtual Scribe.

## 2024-06-21 ENCOUNTER — APPOINTMENT (OUTPATIENT)
Dept: UROLOGY | Facility: CLINIC | Age: 49
End: 2024-06-21
Payer: COMMERCIAL

## 2024-06-26 ENCOUNTER — PATIENT MESSAGE (OUTPATIENT)
Dept: PRIMARY CARE | Facility: CLINIC | Age: 49
End: 2024-06-26
Payer: COMMERCIAL

## 2024-06-26 ENCOUNTER — TELEPHONE (OUTPATIENT)
Dept: PRIMARY CARE | Facility: CLINIC | Age: 49
End: 2024-06-26
Payer: COMMERCIAL

## 2024-06-26 DIAGNOSIS — J06.9 UPPER RESPIRATORY TRACT INFECTION, UNSPECIFIED TYPE: Primary | ICD-10-CM

## 2024-06-26 RX ORDER — LIDOCAINE HYDROCHLORIDE 20 MG/ML
10 SOLUTION OROPHARYNGEAL 4 TIMES DAILY PRN
Qty: 100 ML | Refills: 0 | Status: SHIPPED | OUTPATIENT
Start: 2024-06-26 | End: 2024-06-29

## 2024-06-26 RX ORDER — BENZONATATE 200 MG/1
200 CAPSULE ORAL 3 TIMES DAILY PRN
Qty: 30 CAPSULE | Refills: 0 | Status: SHIPPED | OUTPATIENT
Start: 2024-06-26 | End: 2024-07-26

## 2024-06-26 NOTE — TELEPHONE ENCOUNTER
Patient is in [place] and tested positive for Covid. She wants to know if you would send her a medication for the cough. I added the pharmacy she wants is sent to in [place]. I did tell her you are not here today.

## 2024-07-08 PROCEDURE — RXMED WILLOW AMBULATORY MEDICATION CHARGE

## 2024-07-09 ENCOUNTER — OFFICE VISIT (OUTPATIENT)
Dept: ORTHOPEDIC SURGERY | Facility: CLINIC | Age: 49
End: 2024-07-09
Payer: COMMERCIAL

## 2024-07-09 ENCOUNTER — PHARMACY VISIT (OUTPATIENT)
Dept: PHARMACY | Facility: CLINIC | Age: 49
End: 2024-07-09
Payer: COMMERCIAL

## 2024-07-09 VITALS — WEIGHT: 152 LBS | BODY MASS INDEX: 23.04 KG/M2 | HEIGHT: 68 IN

## 2024-07-09 DIAGNOSIS — M25.561 RIGHT KNEE PAIN, UNSPECIFIED CHRONICITY: Primary | ICD-10-CM

## 2024-07-09 DIAGNOSIS — M23.51 CHRONIC KNEE INSTABILITY, RIGHT: ICD-10-CM

## 2024-07-09 DIAGNOSIS — M17.11 PRIMARY OSTEOARTHRITIS OF RIGHT KNEE: ICD-10-CM

## 2024-07-09 PROCEDURE — 1036F TOBACCO NON-USER: CPT | Performed by: PHYSICIAN ASSISTANT

## 2024-07-09 PROCEDURE — 99213 OFFICE O/P EST LOW 20 MIN: CPT | Performed by: PHYSICIAN ASSISTANT

## 2024-07-09 ASSESSMENT — COLUMBIA-SUICIDE SEVERITY RATING SCALE - C-SSRS
1. IN THE PAST MONTH, HAVE YOU WISHED YOU WERE DEAD OR WISHED YOU COULD GO TO SLEEP AND NOT WAKE UP?: NO
6. HAVE YOU EVER DONE ANYTHING, STARTED TO DO ANYTHING, OR PREPARED TO DO ANYTHING TO END YOUR LIFE?: NO
2. HAVE YOU ACTUALLY HAD ANY THOUGHTS OF KILLING YOURSELF?: NO

## 2024-07-09 ASSESSMENT — PATIENT HEALTH QUESTIONNAIRE - PHQ9
2. FEELING DOWN, DEPRESSED OR HOPELESS: NOT AT ALL
SUM OF ALL RESPONSES TO PHQ9 QUESTIONS 1 AND 2: 0
1. LITTLE INTEREST OR PLEASURE IN DOING THINGS: NOT AT ALL

## 2024-07-09 ASSESSMENT — LIFESTYLE VARIABLES
HOW OFTEN DURING THE LAST YEAR HAVE YOU NEEDED AN ALCOHOLIC DRINK FIRST THING IN THE MORNING TO GET YOURSELF GOING AFTER A NIGHT OF HEAVY DRINKING: NEVER
HOW MANY STANDARD DRINKS CONTAINING ALCOHOL DO YOU HAVE ON A TYPICAL DAY: 1 OR 2
HAS A RELATIVE, FRIEND, DOCTOR, OR ANOTHER HEALTH PROFESSIONAL EXPRESSED CONCERN ABOUT YOUR DRINKING OR SUGGESTED YOU CUT DOWN: NO
HAVE YOU OR SOMEONE ELSE BEEN INJURED AS A RESULT OF YOUR DRINKING: NO
HOW OFTEN DO YOU HAVE A DRINK CONTAINING ALCOHOL: 2-4 TIMES A MONTH
HOW OFTEN DURING THE LAST YEAR HAVE YOU FOUND THAT YOU WERE NOT ABLE TO STOP DRINKING ONCE YOU HAD STARTED: NEVER
HOW OFTEN DURING THE LAST YEAR HAVE YOU FAILED TO DO WHAT WAS NORMALLY EXPECTED FROM YOU BECAUSE OF DRINKING: NEVER
HOW OFTEN DO YOU HAVE SIX OR MORE DRINKS ON ONE OCCASION: NEVER
HOW OFTEN DURING THE LAST YEAR HAVE YOU HAD A FEELING OF GUILT OR REMORSE AFTER DRINKING: NEVER
AUDIT-C TOTAL SCORE: 2
SKIP TO QUESTIONS 9-10: 1
HOW OFTEN DURING THE LAST YEAR HAVE YOU BEEN UNABLE TO REMEMBER WHAT HAPPENED THE NIGHT BEFORE BECAUSE YOU HAD BEEN DRINKING: NEVER
AUDIT TOTAL SCORE: 2

## 2024-07-09 ASSESSMENT — PAIN SCALES - GENERAL
PAINLEVEL_OUTOF10: 2
PAINLEVEL: 2

## 2024-07-09 ASSESSMENT — PAIN - FUNCTIONAL ASSESSMENT: PAIN_FUNCTIONAL_ASSESSMENT: 0-10

## 2024-07-09 ASSESSMENT — ENCOUNTER SYMPTOMS
LOSS OF SENSATION IN FEET: 0
OCCASIONAL FEELINGS OF UNSTEADINESS: 0
DEPRESSION: 0

## 2024-07-09 ASSESSMENT — PAIN DESCRIPTION - DESCRIPTORS: DESCRIPTORS: ACHING;SHARP

## 2024-07-09 NOTE — PATIENT INSTRUCTIONS
Thank you for coming to see us today!     Continue rest, ice and elevation  Tylenol as needed for pain control    We are going to apply for gel injections for the right knee today.   We will call you to schedule your appointments once we receive approval from the insurance company.

## 2024-07-09 NOTE — PROGRESS NOTES
Subjective      Chief Complaint   Patient presents with    Right Knee - Pain     HPI  This 48 year old patient presents today with  right knee pain (8/10). The patient states that this right knee pain has been present for years. She completed a series of gel injections over a year ago with excellent relief of the right knee pain.  She recently had a cortisone injection on 6/11/2024 and his cortisone injection did not provide her relief of her right knee pain.  There has been no recent trauma . The patient rates the knee pain as 8. The patient states that knee pain is worse with and aggravated by activity and bearing weight. The patient admits to right knee instability. The patient has tried ibuprofen and tylenol with no relief. Patient requests a discussion of further treatment options on examination today.     CARDIOLOGY:   Negative for chest pain, shortness of breath.   RESPIRATORY:   Negative for chest pain, shortness of breath.   MUSCULOSKELETAL:   See HPI for details.   NEUROLOGY:   Negative for tingling, numbness, weakness.    Objective    There were no vitals filed for this visit.    Knee Exam  Constitutional: Appears stated age. No apparent distress  Labored Breathing: No  Psychiatric: Normal mood and effect.   Neurological: alert and oriented x3  Skin: intact  MUSCULOSKELETAL: Neck: No tenderness. No pain or limitation with range of motion. Back: No tenderness. Straight leg test negative bilaterally. right knee: There is diffuse tenderness over the knee at the medial joint line. full range of motion McMurrays is negative. Anterior drawer and lachmans are negative. There is not an effusion present.  There is laxity with valgus and varus stressing.  The patient walks with a painful gait favoring the right knee while walking.    Standing AP x-rays of both knees and lateral x-rays of the right knee done and read in office today show osteoarthritis of the right knee.     Patient ID: Florence Albarran is a 48 y.o.  female.        Florence was seen today for pain.  Diagnoses and all orders for this visit:  Right knee pain, unspecified chronicity (Primary)  Primary osteoarthritis of right knee  Chronic knee instability, right  Options are discussed with the patient in detail. The patient had excellent relief with previous gel injections to the right knee.  We will apply for gel injections to the right knee in the office today.  The patient is instructed regarding activity modification and risk for further injury with falling or trauma, ice, provider directed at home gentle strengthening and ROM exercises, and the appropriate use of Tylenol as needed for pain with its potential adverse reactions and side effects. The patient understands.  Return after approval is obtained please note that this report has been produced using speech recognition software.  It may contain errors related to grammar, punctuation or spelling.  Electronically signed, but not reviewed.    Bell Chaney PA-C

## 2024-07-12 PROCEDURE — RXMED WILLOW AMBULATORY MEDICATION CHARGE

## 2024-07-15 ENCOUNTER — PHARMACY VISIT (OUTPATIENT)
Dept: PHARMACY | Facility: CLINIC | Age: 49
End: 2024-07-15
Payer: COMMERCIAL

## 2024-07-17 ENCOUNTER — SPECIALTY PHARMACY (OUTPATIENT)
Dept: PHARMACY | Facility: CLINIC | Age: 49
End: 2024-07-17

## 2024-07-17 DIAGNOSIS — G43.711 CHRONIC MIGRAINE WITHOUT AURA, INTRACTABLE, WITH STATUS MIGRAINOSUS: Primary | ICD-10-CM

## 2024-07-17 RX ORDER — FREMANEZUMAB-VFRM 225 MG/1.5ML
INJECTION SUBCUTANEOUS
Qty: 4.5 ML | Refills: 2 | Status: SHIPPED | OUTPATIENT
Start: 2024-07-17 | End: 2025-07-17

## 2024-07-19 ENCOUNTER — APPOINTMENT (OUTPATIENT)
Dept: UROLOGY | Facility: CLINIC | Age: 49
End: 2024-07-19
Payer: COMMERCIAL

## 2024-07-19 ENCOUNTER — OFFICE VISIT (OUTPATIENT)
Dept: NEUROLOGY | Facility: HOSPITAL | Age: 49
End: 2024-07-19
Payer: COMMERCIAL

## 2024-07-19 VITALS
BODY MASS INDEX: 23.04 KG/M2 | SYSTOLIC BLOOD PRESSURE: 120 MMHG | HEIGHT: 68 IN | HEART RATE: 88 BPM | DIASTOLIC BLOOD PRESSURE: 71 MMHG | WEIGHT: 152 LBS

## 2024-07-19 DIAGNOSIS — G43.709 CHRONIC MIGRAINE WITHOUT AURA WITHOUT STATUS MIGRAINOSUS, NOT INTRACTABLE: ICD-10-CM

## 2024-07-19 PROCEDURE — 20553 NJX 1/MLT TRIGGER POINTS 3/>: CPT | Performed by: PSYCHIATRY & NEUROLOGY

## 2024-07-19 PROCEDURE — 96372 THER/PROPH/DIAG INJ SC/IM: CPT | Performed by: PSYCHIATRY & NEUROLOGY

## 2024-07-19 PROCEDURE — 2500000004 HC RX 250 GENERAL PHARMACY W/ HCPCS (ALT 636 FOR OP/ED): Performed by: PSYCHIATRY & NEUROLOGY

## 2024-07-19 PROCEDURE — 2500000005 HC RX 250 GENERAL PHARMACY W/O HCPCS: Performed by: PSYCHIATRY & NEUROLOGY

## 2024-07-19 PROCEDURE — 99214 OFFICE O/P EST MOD 30 MIN: CPT | Performed by: PSYCHIATRY & NEUROLOGY

## 2024-07-19 PROCEDURE — 3008F BODY MASS INDEX DOCD: CPT | Performed by: PSYCHIATRY & NEUROLOGY

## 2024-07-19 RX ORDER — METHYLPREDNISOLONE 4 MG/1
TABLET ORAL
Qty: 21 TABLET | Refills: 0 | Status: SHIPPED | OUTPATIENT
Start: 2024-07-19 | End: 2024-07-26

## 2024-07-19 RX ORDER — ORPHENADRINE CITRATE 100 MG/1
100 TABLET, EXTENDED RELEASE ORAL 2 TIMES DAILY PRN
Qty: 60 TABLET | Refills: 3 | Status: SHIPPED | OUTPATIENT
Start: 2024-07-19 | End: 2025-07-19

## 2024-07-19 NOTE — PROGRESS NOTES
Follow up visit  Ms. Albarran is a 48 years old with chronic migraine who presents for follow up.    Patient has been doing well in general and like the current regimen, having migraine once a month. However, patient has nightly headache over the past week, starting from R occipital. She could not name the trigger, but her right side of her neck has been hurting lately. Used to work with PT but was not helpful and tried muscle relaxant with limited response.    Current preventative regimen:  -Botox every 90 days, last dose 5/31/24  -Ajovy  -Gabapentin    Abortive regimen (depends on severity)  -Excedrin, Sudafed  -Rizatriptan  -Ubrelvy, sometimes  -Sumatriptan injection, occasionally.  -Frovatriptan    Brief neurological examination:  Patient is not acute distress  EOM full, face symmetric  Strength preserved 5/5 throughout BUE and BLE in proximal and distal muscle groups  Sensation intact to light touch    Plans:  -Continue current regimen.  -Trigger point injection today.  -Medrol Dospak.    Patient was discussed and examined with Dr. Munoz.    Leonel Finn MD ([place])  Neurology Resident, PGY4\    I saw and evaluated the patient. I personally obtained the key and critical portions of the history and physical exam or was physically present for key and critical portions performed by the resident/fellow. I reviewed the resident/fellow's documentation and discussed the patient with the resident/fellow. I agree with the resident/fellow's medical decision making as documented in the note.    Scarlet Munoz MD

## 2024-07-22 ENCOUNTER — SPECIALTY PHARMACY (OUTPATIENT)
Dept: PHARMACY | Facility: CLINIC | Age: 49
End: 2024-07-22

## 2024-07-22 PROCEDURE — RXMED WILLOW AMBULATORY MEDICATION CHARGE

## 2024-07-22 RX ORDER — LIDOCAINE HYDROCHLORIDE 10 MG/ML
5 INJECTION INFILTRATION; PERINEURAL ONCE
Status: COMPLETED | OUTPATIENT
Start: 2024-07-22 | End: 2024-07-19

## 2024-07-22 RX ORDER — BETAMETHASONE SODIUM PHOSPHATE AND BETAMETHASONE ACETATE 3; 3 MG/ML; MG/ML
6 INJECTION, SUSPENSION INTRA-ARTICULAR; INTRALESIONAL; INTRAMUSCULAR; SOFT TISSUE ONCE
Status: COMPLETED | OUTPATIENT
Start: 2024-07-22 | End: 2024-07-19

## 2024-07-22 RX ORDER — BUPIVACAINE HYDROCHLORIDE 5 MG/ML
5 INJECTION, SOLUTION PERINEURAL ONCE
Status: COMPLETED | OUTPATIENT
Start: 2024-07-22 | End: 2024-07-19

## 2024-07-22 NOTE — PROGRESS NOTES
Patient ID: Florence Albarran is a 48 y.o. female.    >3 Inject Trigger Points    Date/Time: 7/19/2024 10:15 AM    Performed by: Scarlet Munoz MD  Authorized by: Scarlet Munoz MD  Local anesthesia used: yes  Anesthesia: local infiltration and see MAR for details    Anesthesia:  Local anesthesia used: yes  Local Anesthetic: lidocaine 1% without epinephrine and bupivacaine 0.5% without epinephrine  Anesthetic total: 10 mL    Sedation:  Patient sedated: no            Trigger Point Injection     Pre-operative diagnosis: myofascial pain    Post-operative diagnosis: myofascial pain    After risks and benefits were explained including bleeding, infection, worsening of the pain, damage to the area being injected, weakness, allergic reaction to medications, vascular injection, and nerve damage, signed consent was obtained.  All questions were answered.      The area of the trigger point was identified and the skin prepped  with alcohol and the alcohol allowed to dry.  Next, a 25 gauge  was placed in the area of the trigger point.  Once reproduction of the pain was elicited and negative aspiration confirmed, the trigger point was injected and the needle removed.      The patient did tolerate the procedure well and there were complications.      Medication used: 12mg of Celestone , 25 mg of 0.5% Bupivicaine, 50 mg of 1% Lidocaine     Trigger points injected: 3      Trigger point(s) location(s):  bilateral  levator, scalenes, and Trapezius

## 2024-07-23 ENCOUNTER — PHARMACY VISIT (OUTPATIENT)
Dept: PHARMACY | Facility: CLINIC | Age: 49
End: 2024-07-23
Payer: COMMERCIAL

## 2024-07-23 ENCOUNTER — APPOINTMENT (OUTPATIENT)
Dept: GASTROENTEROLOGY | Facility: CLINIC | Age: 49
End: 2024-07-23
Payer: COMMERCIAL

## 2024-07-23 PROCEDURE — RXMED WILLOW AMBULATORY MEDICATION CHARGE

## 2024-08-01 ENCOUNTER — TELEPHONE (OUTPATIENT)
Dept: NEUROLOGY | Facility: CLINIC | Age: 49
End: 2024-08-01
Payer: COMMERCIAL

## 2024-08-01 NOTE — TELEPHONE ENCOUNTER
Florence states neck pain is no better and possible worse after trigger points last week. She is struggling to work at her computer for any length of time as pain is so severe., Norflex making mouth so dry that she cannot take it. She is taking Tizanidine and baclofen but pain is still severe.   Medrol was not helpful.   ADVISE

## 2024-08-01 NOTE — TELEPHONE ENCOUNTER
Florence is not able to come to [facility] 8-2-2024 at 1:30 for a visit. If you have any other intervention

## 2024-08-01 NOTE — TELEPHONE ENCOUNTER
Called to refill gabapentin (Neurontin) 600 mg tablet Qty 30.  Pharmacy is   [id number] - [place], [place] - [address] Phone: [phone]

## 2024-08-02 ENCOUNTER — SPECIALTY PHARMACY (OUTPATIENT)
Dept: PHARMACY | Facility: CLINIC | Age: 49
End: 2024-08-02

## 2024-08-02 DIAGNOSIS — G43.709 CHRONIC MIGRAINE WITHOUT AURA WITHOUT STATUS MIGRAINOSUS, NOT INTRACTABLE: ICD-10-CM

## 2024-08-02 RX ORDER — GABAPENTIN 600 MG/1
600 TABLET ORAL DAILY
Qty: 30 TABLET | Refills: 1 | Status: SHIPPED | OUTPATIENT
Start: 2024-08-02

## 2024-08-05 PROCEDURE — RXMED WILLOW AMBULATORY MEDICATION CHARGE

## 2024-08-06 ENCOUNTER — PHARMACY VISIT (OUTPATIENT)
Dept: PHARMACY | Facility: CLINIC | Age: 49
End: 2024-08-06
Payer: COMMERCIAL

## 2024-08-20 ENCOUNTER — APPOINTMENT (OUTPATIENT)
Dept: UROLOGY | Facility: CLINIC | Age: 49
End: 2024-08-20
Payer: COMMERCIAL

## 2024-08-20 VITALS
SYSTOLIC BLOOD PRESSURE: 113 MMHG | WEIGHT: 165 LBS | HEIGHT: 68 IN | BODY MASS INDEX: 25.01 KG/M2 | DIASTOLIC BLOOD PRESSURE: 77 MMHG | HEART RATE: 94 BPM | TEMPERATURE: 97.6 F

## 2024-08-20 DIAGNOSIS — R35.0 URINARY FREQUENCY: ICD-10-CM

## 2024-08-20 DIAGNOSIS — N20.0 KIDNEY STONES: ICD-10-CM

## 2024-08-20 DIAGNOSIS — R31.29 MICROSCOPIC HEMATURIA: ICD-10-CM

## 2024-08-20 LAB
POC APPEARANCE, URINE: CLEAR
POC BILIRUBIN, URINE: NEGATIVE
POC BLOOD, URINE: ABNORMAL
POC COLOR, URINE: YELLOW
POC GLUCOSE, URINE: NEGATIVE MG/DL
POC KETONES, URINE: NEGATIVE MG/DL
POC LEUKOCYTES, URINE: NEGATIVE
POC NITRITE,URINE: NEGATIVE
POC PH, URINE: 5.5 PH
POC PROTEIN, URINE: NEGATIVE MG/DL
POC SPECIFIC GRAVITY, URINE: >=1.03
POC UROBILINOGEN, URINE: 0.2 EU/DL

## 2024-08-20 PROCEDURE — 81003 URINALYSIS AUTO W/O SCOPE: CPT | Performed by: UROLOGY

## 2024-08-20 PROCEDURE — 52000 CYSTOURETHROSCOPY: CPT | Performed by: UROLOGY

## 2024-08-20 ASSESSMENT — PAIN SCALES - GENERAL: PAINLEVEL: 0-NO PAIN

## 2024-08-20 NOTE — PROGRESS NOTES
"49 yo female presents for cystoscopy to complete her microscopic hematuria workup.      Cystoscopy performed:   Florence Albarran identified using two (2) forms of identification.  Procedure: diagnostic cystourethroscopy  Indications for procedure: Enlarged prostate  Risks, benefits, and alternatives were discussed in detail.   Patient appears to understand and agrees to proceed.   Patient has signed the procedure consent form.     PROCEDURE NOTE:    PREOPERATIVE DIAGNOSIS:  Kidney stones and to complete her microscopic hematuria workup.     POSTOPERATIVE DIAGNOSIS:  Same    OPERATION:  Flexible Cystourethroscopy    SURGEON:  Aggie Jonas MD     ANESTHESIA:  2%  lidocaine jelly    COMPLICATIONS:  None    EBL: Minimal    SPECIMEN: Normal    DISPOSITION:  The patient was discharged home after the procedure, per routine.    INDICATIONS: :  Ms. Albarran is a 48 y.o. patient with a history of kidney stones who presents today for Cystoscopy.     The indications, risks and benefits of this procedure were discussed with the patient, consent was obtained prior to the procedure, and to the best of my judgement the patient seemed to understand and agree to the procedure.    PROCEDURE:  The patient  was brought into the procedure suite and informed consent was reviewed and confirmed. Vital signs were obtained prior to the procedure: /77   Pulse 94   Temp 36.4 °C (97.6 °F)   Ht 1.727 m (5' 8\")   Wt 74.8 kg (165 lb)   BMI 25.09 kg/m² .   The patient was escorted onto the stretcher, placed supine and froglegged, prepped with betadine and draped in the usual standard surgical fashion.  Intraurethral 2% viscous lidocaine jelly was used for local analgesia.  A 16 [place] flexible cystourethroscope was inserted into the urethra.     Upon entering the bladder the entire bladder was surveyed in a 360 degree fashion.  The left and right ureteral orifices were in normal orthotopic position effluxing clear yellow urine, bilaterally. "   There was no evidence of any bladder lesions, foreign objects, stones or evidence of any mucosal changes. The cystoscope was then retroflexed.  The bladder neck was then further examined without any evidence of lesions. The scope was then removed and in an antegrade fashion, the urethra and bladder were again resurveyed with no evidence of additional lesions.  The cystoscope was then fully removed.   The patient tolerated the procedure well.  Vitals were stable after the procedure.  The patient was able to void and was discharged home.  Verbal and written Post procedure instructions were reviewed with the patient.    IMPRESSION:  Kidney Stones  Completion of her microscopic hematuria workup.     PLAN:  We discussed trial of Gemtesa 75 mg, explained. We discussed the risks of Gemtesa which include incomplete bladder emptying, sinus pain, dry mouth, sore throat, joint pain, diarrhea, constipation, bloating, and anxiety. Patient understands and desires to proceed.     We will follow up annually with renal US to monitor stone presence and formation.      Scribe Attestation  By signing my name below, ICarie Scribe   attest that this documentation has been prepared under the direction and in the presence of Aggie Jonas MD.

## 2024-08-21 PROCEDURE — RXMED WILLOW AMBULATORY MEDICATION CHARGE

## 2024-08-23 ENCOUNTER — SPECIALTY PHARMACY (OUTPATIENT)
Dept: PHARMACY | Facility: CLINIC | Age: 49
End: 2024-08-23

## 2024-08-23 ENCOUNTER — PHARMACY VISIT (OUTPATIENT)
Dept: PHARMACY | Facility: CLINIC | Age: 49
End: 2024-08-23
Payer: COMMERCIAL

## 2024-08-30 ENCOUNTER — PROCEDURE VISIT (OUTPATIENT)
Dept: NEUROLOGY | Facility: HOSPITAL | Age: 49
End: 2024-08-30
Payer: COMMERCIAL

## 2024-08-30 VITALS
DIASTOLIC BLOOD PRESSURE: 81 MMHG | HEART RATE: 109 BPM | SYSTOLIC BLOOD PRESSURE: 122 MMHG | BODY MASS INDEX: 22.96 KG/M2 | HEIGHT: 69 IN | WEIGHT: 155 LBS

## 2024-08-30 DIAGNOSIS — G43.009 MIGRAINE WITHOUT AURA AND WITHOUT STATUS MIGRAINOSUS, NOT INTRACTABLE: ICD-10-CM

## 2024-08-30 DIAGNOSIS — G43.711 INTRACTABLE CHRONIC MIGRAINE WITHOUT AURA AND WITH STATUS MIGRAINOSUS: Primary | ICD-10-CM

## 2024-08-30 DIAGNOSIS — G43.709 CHRONIC MIGRAINE WITHOUT AURA WITHOUT STATUS MIGRAINOSUS, NOT INTRACTABLE: ICD-10-CM

## 2024-08-30 PROCEDURE — 2500000004 HC RX 250 GENERAL PHARMACY W/ HCPCS (ALT 636 FOR OP/ED): Mod: JZ | Performed by: PSYCHIATRY & NEUROLOGY

## 2024-08-30 PROCEDURE — 64615 CHEMODENERV MUSC MIGRAINE: CPT | Performed by: PSYCHIATRY & NEUROLOGY

## 2024-08-30 PROCEDURE — 96372 THER/PROPH/DIAG INJ SC/IM: CPT | Performed by: PSYCHIATRY & NEUROLOGY

## 2024-08-30 RX ORDER — BACLOFEN 10 MG/1
10 TABLET ORAL 3 TIMES DAILY
Qty: 270 TABLET | Refills: 3 | Status: SHIPPED | OUTPATIENT
Start: 2024-08-30 | End: 2025-08-30

## 2024-08-30 RX ORDER — GABAPENTIN 600 MG/1
600 TABLET ORAL DAILY
Qty: 90 TABLET | Refills: 3 | Status: SHIPPED | OUTPATIENT
Start: 2024-08-30 | End: 2025-08-30

## 2024-08-30 RX ORDER — RIZATRIPTAN BENZOATE 10 MG/1
TABLET, ORALLY DISINTEGRATING ORAL
Qty: 12 TABLET | Refills: 11 | Status: SHIPPED | OUTPATIENT
Start: 2024-08-30

## 2024-08-30 RX ORDER — KETOROLAC TROMETHAMINE 30 MG/ML
60 INJECTION, SOLUTION INTRAMUSCULAR; INTRAVENOUS ONCE
Status: COMPLETED | OUTPATIENT
Start: 2024-08-30 | End: 2024-08-30

## 2024-08-30 RX ORDER — KETOROLAC TROMETHAMINE 10 MG/1
10 TABLET, FILM COATED ORAL EVERY 6 HOURS PRN
Qty: 20 TABLET | Refills: 0 | Status: SHIPPED | OUTPATIENT
Start: 2024-08-30 | End: 2024-09-04

## 2024-08-30 NOTE — PROGRESS NOTES
Patient ID: Florence Albarran is a 48 y.o. female.    Head/Face/Jaw Botulinum Injection    Date/Time: 8/30/2024 5:22 PM    Performed by: Scarlet Munoz MD  Authorized by: Scarlet Munoz MD      Consent:      Consent obtained:  Verbal     Consent given by:  Patient    Procedure details:      EMG used?  No     Electrical stimulation used?  No     Diluted by:  Preservative free saline     Toxin (Brand):  OnaBoNT-A (Botox)     Comments about vials and dilution:  Spp     Total units available:  200       Ad hoc region injected:  Head see diagram with 200 units     Total units injected:  200     Total units wasted:  0    Post-procedure details:      Patient tolerance of procedure:  Tolerated well, no immediate complications    Comments: Please do not rub areas for 24 hours.  No pressure above eyebrows for 24 hours.  Watch out for helmets, headlamps, headbands, goggles, or massage for 24 hours.  If there is discomfort, ice for the first 24 hour,s heat after that.  Headaches may worsen, or you may experience neck stiffness.  If this occurs use your usual headache medication or a mild anti inflammatory such as advil or aleve.  Please call if you have difficulty swallowing.    You received Toradol today for your severe headache.  We are going to continue with 5 day of pills starting tomorrow to break your headache cycle.  Take 1 pill with breakfast lunch dinner and bedtime for 5 days.  Take with food.  Try not to take your triptan or antiinflammatory during this time.  If you have any nausea or diarrhea with the medicine stop and call on the next business day.

## 2024-09-03 DIAGNOSIS — R11.2 NAUSEA AND VOMITING, UNSPECIFIED VOMITING TYPE: ICD-10-CM

## 2024-09-03 PROCEDURE — RXMED WILLOW AMBULATORY MEDICATION CHARGE

## 2024-09-03 RX ORDER — ONDANSETRON HYDROCHLORIDE 8 MG/1
TABLET, FILM COATED ORAL
Qty: 30 TABLET | Refills: 6 | Status: SHIPPED | OUTPATIENT
Start: 2024-09-03 | End: 2025-09-03

## 2024-09-04 ENCOUNTER — TELEPHONE (OUTPATIENT)
Dept: NEUROLOGY | Facility: CLINIC | Age: 49
End: 2024-09-04
Payer: COMMERCIAL

## 2024-09-04 PROCEDURE — RXMED WILLOW AMBULATORY MEDICATION CHARGE

## 2024-09-04 NOTE — PROGRESS NOTES
Subjective      Chief Complaint   Patient presents with    Right Knee - Pain     HPI  This 48 year old patient presents today with  right knee pain (8/10). The patient states that this right knee pain has been present for years. She completed a series of gel injections over a year ago with excellent relief of the right knee pain.  She recently had a cortisone injection on 6/11/2024 and his cortisone injection did not provide her relief of her right knee pain.  There has been no recent trauma . The patient rates the knee pain as 8. The patient states that knee pain is worse with and aggravated by activity and bearing weight. The patient admits to right knee instability. The patient has tried ibuprofen and tylenol with no relief. Patient requests a discussion of further treatment options on examination today.     CARDIOLOGY:   Negative for chest pain, shortness of breath.   RESPIRATORY:   Negative for chest pain, shortness of breath.   MUSCULOSKELETAL:   See HPI for details.   NEUROLOGY:   Negative for tingling, numbness, weakness.    Objective    There were no vitals filed for this visit.    Knee Exam  Constitutional: Appears stated age. No apparent distress  Labored Breathing: No  Psychiatric: Normal mood and effect.   Neurological: alert and oriented x3  Skin: intact  MUSCULOSKELETAL: Neck: No tenderness. No pain or limitation with range of motion. Back: No tenderness. Straight leg test negative bilaterally. right knee: There is diffuse tenderness over the knee at the medial joint line. full range of motion McMurrays is negative. Anterior drawer and lachmans are negative. There is not an effusion present.  There is laxity with valgus and varus stressing.  The patient walks with a painful gait favoring the right knee while walking.    Standing AP x-rays of both knees and lateral x-rays of the right knee done and read in office today show osteoarthritis of the right knee.     Patient ID: Florence Albarran is a 48 y.o.  female.        Florence was seen today for pain.  Diagnoses and all orders for this visit:  Right knee pain, unspecified chronicity (Primary)  Primary osteoarthritis of right knee  Chronic knee instability, right    Options are discussed with the patient in detail. The patient had excellent relief with previous gel injections to the right knee.  We are awaiting approval for gel injections.  The patient is instructed regarding activity modification and risk for further injury with falling or trauma, ice, provider directed at home gentle strengthening and ROM exercises, and the appropriate use of Tylenol as needed for pain with its potential adverse reactions and side effects. The patient understands.  Return after approval is obtained please note that this report has been produced using speech recognition software.  It may contain errors related to grammar, punctuation or spelling.  Electronically signed, but not reviewed.    Bell Chaney PA-C

## 2024-09-04 NOTE — TELEPHONE ENCOUNTER
----- Message from Hollie Schroeder sent at 9/4/2024  2:52 PM EDT -----  Regarding: RE: Nurtec vs Ubrelvy?  Yes, both are approved and I will add a note to both of them so there is no confusion. Thank you!  ----- Message -----  From: Sussy Mott RN  Sent: 9/4/2024   2:42 PM EDT  To: Hollie Schroeder PharmD  Subject: RE: Nurtec vs Ubrelvy?                           IF both can be approved, she has been counseled on using both. Does not use in same 24 hours period.  ----- Message -----  From: Hollie Schroeder PharmD  Sent: 9/4/2024  10:25 AM EDT  To: Sussy Mott RN  Subject: Nurtec vs Ubrelvy?                               Hello,    For this patient, we have active scripts for both Nurtec and Ubrelvy for abortive therapy. Is the patient supposed to be using both or just one? Please let me know and thank you in advance!    Sincerely,    Hollie Schroeder, PharmD, [facility]

## 2024-09-05 ENCOUNTER — TELEPHONE (OUTPATIENT)
Dept: NEUROLOGY | Facility: CLINIC | Age: 49
End: 2024-09-05
Payer: COMMERCIAL

## 2024-09-05 NOTE — TELEPHONE ENCOUNTER
Called to refill rimegepant (Nurtec ODT) 75 mg tablet,disintegrating Qty 8 With Refills, rizatriptan MLT (Maxalt-MLT) 10 mg disintegrating tablet Qty 12 with Refills And gabapentin (Neurontin) 600 mg tablet Qty 30 With refills.  Pharmacy is   [id number] [place] [place] [facility] [address] Phone: [phone]

## 2024-09-05 NOTE — TELEPHONE ENCOUNTER
All these medications were filled on 8/30/24. Left a message with patient to call back so we can discuss

## 2024-09-06 ENCOUNTER — OFFICE VISIT (OUTPATIENT)
Dept: ORTHOPEDIC SURGERY | Facility: CLINIC | Age: 49
End: 2024-09-06
Payer: COMMERCIAL

## 2024-09-06 VITALS — HEIGHT: 68 IN | BODY MASS INDEX: 22.73 KG/M2 | WEIGHT: 150 LBS

## 2024-09-06 DIAGNOSIS — M25.561 RIGHT KNEE PAIN, UNSPECIFIED CHRONICITY: Primary | ICD-10-CM

## 2024-09-06 DIAGNOSIS — M23.51 CHRONIC KNEE INSTABILITY, RIGHT: ICD-10-CM

## 2024-09-06 DIAGNOSIS — M17.11 PRIMARY OSTEOARTHRITIS OF RIGHT KNEE: ICD-10-CM

## 2024-09-06 PROCEDURE — 3008F BODY MASS INDEX DOCD: CPT | Performed by: PHYSICIAN ASSISTANT

## 2024-09-06 PROCEDURE — 1036F TOBACCO NON-USER: CPT | Performed by: PHYSICIAN ASSISTANT

## 2024-09-06 ASSESSMENT — COLUMBIA-SUICIDE SEVERITY RATING SCALE - C-SSRS
2. HAVE YOU ACTUALLY HAD ANY THOUGHTS OF KILLING YOURSELF?: NO
1. IN THE PAST MONTH, HAVE YOU WISHED YOU WERE DEAD OR WISHED YOU COULD GO TO SLEEP AND NOT WAKE UP?: NO
6. HAVE YOU EVER DONE ANYTHING, STARTED TO DO ANYTHING, OR PREPARED TO DO ANYTHING TO END YOUR LIFE?: NO

## 2024-09-06 ASSESSMENT — LIFESTYLE VARIABLES
HOW OFTEN DURING THE LAST YEAR HAVE YOU FAILED TO DO WHAT WAS NORMALLY EXPECTED FROM YOU BECAUSE OF DRINKING: NEVER
SKIP TO QUESTIONS 9-10: 1
AUDIT-C TOTAL SCORE: 0
HOW MANY STANDARD DRINKS CONTAINING ALCOHOL DO YOU HAVE ON A TYPICAL DAY: PATIENT DOES NOT DRINK
HOW OFTEN DO YOU HAVE A DRINK CONTAINING ALCOHOL: NEVER
HOW OFTEN DURING THE LAST YEAR HAVE YOU NEEDED AN ALCOHOLIC DRINK FIRST THING IN THE MORNING TO GET YOURSELF GOING AFTER A NIGHT OF HEAVY DRINKING: NEVER
HAVE YOU OR SOMEONE ELSE BEEN INJURED AS A RESULT OF YOUR DRINKING: NO
HOW OFTEN DURING THE LAST YEAR HAVE YOU BEEN UNABLE TO REMEMBER WHAT HAPPENED THE NIGHT BEFORE BECAUSE YOU HAD BEEN DRINKING: NEVER
HOW OFTEN DO YOU HAVE SIX OR MORE DRINKS ON ONE OCCASION: NEVER
HAS A RELATIVE, FRIEND, DOCTOR, OR ANOTHER HEALTH PROFESSIONAL EXPRESSED CONCERN ABOUT YOUR DRINKING OR SUGGESTED YOU CUT DOWN: NO
HOW OFTEN DURING THE LAST YEAR HAVE YOU HAD A FEELING OF GUILT OR REMORSE AFTER DRINKING: NEVER
HOW OFTEN DURING THE LAST YEAR HAVE YOU FOUND THAT YOU WERE NOT ABLE TO STOP DRINKING ONCE YOU HAD STARTED: NEVER
AUDIT TOTAL SCORE: 0

## 2024-09-06 ASSESSMENT — PATIENT HEALTH QUESTIONNAIRE - PHQ9
1. LITTLE INTEREST OR PLEASURE IN DOING THINGS: NOT AT ALL
2. FEELING DOWN, DEPRESSED OR HOPELESS: NOT AT ALL
SUM OF ALL RESPONSES TO PHQ9 QUESTIONS 1 AND 2: 0

## 2024-09-06 ASSESSMENT — PAIN SCALES - GENERAL
PAINLEVEL_OUTOF10: 5 - MODERATE PAIN
PAINLEVEL_OUTOF10: 5 - MODERATE PAIN

## 2024-09-06 ASSESSMENT — PAIN DESCRIPTION - DESCRIPTORS
DESCRIPTORS: ACHING;STABBING
DESCRIPTORS: ACHING;STABBING

## 2024-09-06 ASSESSMENT — ENCOUNTER SYMPTOMS
OCCASIONAL FEELINGS OF UNSTEADINESS: 0
DEPRESSION: 0
LOSS OF SENSATION IN FEET: 0

## 2024-09-06 ASSESSMENT — PAIN - FUNCTIONAL ASSESSMENT: PAIN_FUNCTIONAL_ASSESSMENT: 0-10

## 2024-09-11 DIAGNOSIS — R35.0 URINARY FREQUENCY: ICD-10-CM

## 2024-09-11 NOTE — PROGRESS NOTES
Subjective      Chief Complaint   Patient presents with    Right Knee - Pain     HPI  This 48 year old patient presents today with  right knee pain (8/10). The patient states that this right knee pain has been present for years. She completed a series of gel injections over a year ago with excellent relief of the right knee pain.  She recently had a cortisone injection on 6/11/2024 and his cortisone injection did not provide her relief of her right knee pain.  There has been no recent trauma . The patient rates the knee pain as 8. The patient states that knee pain is worse with and aggravated by activity and bearing weight. The patient admits to right knee instability. The patient has tried ibuprofen and tylenol with no relief. The patient received a Durolane injection into her right knee at this appointment.     CARDIOLOGY:   Negative for chest pain, shortness of breath.   RESPIRATORY:   Negative for chest pain, shortness of breath.   MUSCULOSKELETAL:   See HPI for details.   NEUROLOGY:   Negative for tingling, numbness, weakness.    Objective    There were no vitals filed for this visit.    Knee Exam  Constitutional: Appears stated age. No apparent distress  Labored Breathing: No  Psychiatric: Normal mood and effect.   Neurological: alert and oriented x3  Skin: intact  MUSCULOSKELETAL: Neck: No tenderness. No pain or limitation with range of motion. Back: No tenderness. Straight leg test negative bilaterally. right knee: There is diffuse tenderness over the knee at the medial joint line. full range of motion McMurrays is negative. Anterior drawer and lachmans are negative. There is not an effusion present.  There is laxity with valgus and varus stressing.  The patient walks with a painful gait favoring the right knee while walking.    Standing AP x-rays of both knees and lateral x-rays of the right knee done and read in office today show osteoarthritis of the right knee.     Patient ID: Florence Albarran is a 48  y.o. female.    L Inj/Asp: R knee on 9/12/2024 8:55 AM  Indications: pain  Details: 22 G needle, medial approach  Medications: 60 mg sodium hyaluronate 60 mg/3 mL  Outcome: tolerated well, no immediate complications  Consent was given by the patient. Immediately prior to procedure a time out was called to verify the correct patient, procedure, equipment, support staff and site/side marked as required.             Florence was seen today for pain.  Diagnoses and all orders for this visit:  Right knee pain, unspecified chronicity  -     L Inj/Asp: R knee  Primary osteoarthritis of right knee  -     L Inj/Asp: R knee  Chronic knee instability, right  Other orders  -     Cancel: L Inj/Asp      Options are discussed with the patient in detail. The patient had excellent relief with previous gel injections to the right knee.  Performed Durolane injection into the patient's right knee today.  The patient is instructed regarding activity modification and risk for further injury with falling or trauma, ice, provider directed at home gentle strengthening and ROM exercises, and the appropriate use of Tylenol as needed for pain with its potential adverse reactions and side effects. The patient understands.   please note that this report has been produced using speech recognition software.  It may contain errors related to grammar, punctuation or spelling.  Electronically signed, but not reviewed.    Ricky Chavez MD

## 2024-09-12 ENCOUNTER — PHARMACY VISIT (OUTPATIENT)
Dept: PHARMACY | Facility: CLINIC | Age: 49
End: 2024-09-12
Payer: COMMERCIAL

## 2024-09-12 ENCOUNTER — OFFICE VISIT (OUTPATIENT)
Dept: ORTHOPEDIC SURGERY | Facility: CLINIC | Age: 49
End: 2024-09-12
Payer: COMMERCIAL

## 2024-09-12 VITALS — BODY MASS INDEX: 22.43 KG/M2 | WEIGHT: 148 LBS | HEIGHT: 68 IN

## 2024-09-12 DIAGNOSIS — M25.561 RIGHT KNEE PAIN, UNSPECIFIED CHRONICITY: ICD-10-CM

## 2024-09-12 DIAGNOSIS — M23.51 CHRONIC KNEE INSTABILITY, RIGHT: ICD-10-CM

## 2024-09-12 DIAGNOSIS — M17.11 PRIMARY OSTEOARTHRITIS OF RIGHT KNEE: ICD-10-CM

## 2024-09-12 PROCEDURE — 99213 OFFICE O/P EST LOW 20 MIN: CPT | Performed by: ORTHOPAEDIC SURGERY

## 2024-09-12 PROCEDURE — 2500000004 HC RX 250 GENERAL PHARMACY W/ HCPCS (ALT 636 FOR OP/ED): Mod: JZ | Performed by: ORTHOPAEDIC SURGERY

## 2024-09-12 PROCEDURE — 20610 DRAIN/INJ JOINT/BURSA W/O US: CPT | Mod: RT | Performed by: ORTHOPAEDIC SURGERY

## 2024-09-12 PROCEDURE — 3008F BODY MASS INDEX DOCD: CPT | Performed by: ORTHOPAEDIC SURGERY

## 2024-09-12 PROCEDURE — 1036F TOBACCO NON-USER: CPT | Performed by: ORTHOPAEDIC SURGERY

## 2024-09-12 ASSESSMENT — PATIENT HEALTH QUESTIONNAIRE - PHQ9
8. MOVING OR SPEAKING SO SLOWLY THAT OTHER PEOPLE COULD HAVE NOTICED. OR THE OPPOSITE, BEING SO FIGETY OR RESTLESS THAT YOU HAVE BEEN MOVING AROUND A LOT MORE THAN USUAL: NOT AT ALL
9. THOUGHTS THAT YOU WOULD BE BETTER OFF DEAD, OR OF HURTING YOURSELF: NOT AT ALL
6. FEELING BAD ABOUT YOURSELF - OR THAT YOU ARE A FAILURE OR HAVE LET YOURSELF OR YOUR FAMILY DOWN: NOT AT ALL
SUM OF ALL RESPONSES TO PHQ9 QUESTIONS 1 AND 2: 0
10. IF YOU CHECKED OFF ANY PROBLEMS, HOW DIFFICULT HAVE THESE PROBLEMS MADE IT FOR YOU TO DO YOUR WORK, TAKE CARE OF THINGS AT HOME, OR GET ALONG WITH OTHER PEOPLE: VERY DIFFICULT
5. POOR APPETITE OR OVEREATING: NOT AT ALL
3. TROUBLE FALLING OR STAYING ASLEEP OR SLEEPING TOO MUCH: NOT AT ALL
2. FEELING DOWN, DEPRESSED OR HOPELESS: NEARLY EVERY DAY
7. TROUBLE CONCENTRATING ON THINGS, SUCH AS READING THE NEWSPAPER OR WATCHING TELEVISION: NOT AT ALL
2. FEELING DOWN, DEPRESSED OR HOPELESS: NOT AT ALL
5. POOR APPETITE OR OVEREATING: NOT AT ALL
6. FEELING BAD ABOUT YOURSELF - OR THAT YOU ARE A FAILURE OR HAVE LET YOURSELF OR YOUR FAMILY DOWN: NOT AT ALL
SUM OF ALL RESPONSES TO PHQ9 QUESTIONS 1 AND 2: 6
1. LITTLE INTEREST OR PLEASURE IN DOING THINGS: NOT AT ALL
SUM OF ALL RESPONSES TO PHQ QUESTIONS 1-9: 0
SUM OF ALL RESPONSES TO PHQ QUESTIONS 1-9: 6
4. FEELING TIRED OR HAVING LITTLE ENERGY: NOT AT ALL
8. MOVING OR SPEAKING SO SLOWLY THAT OTHER PEOPLE COULD HAVE NOTICED. OR THE OPPOSITE, BEING SO FIGETY OR RESTLESS THAT YOU HAVE BEEN MOVING AROUND A LOT MORE THAN USUAL: NOT AT ALL
3. TROUBLE FALLING OR STAYING ASLEEP OR SLEEPING TOO MUCH: NOT AT ALL
1. LITTLE INTEREST OR PLEASURE IN DOING THINGS: NEARLY EVERY DAY
4. FEELING TIRED OR HAVING LITTLE ENERGY: NOT AT ALL
7. TROUBLE CONCENTRATING ON THINGS, SUCH AS READING THE NEWSPAPER OR WATCHING TELEVISION: NOT AT ALL
9. THOUGHTS THAT YOU WOULD BE BETTER OFF DEAD, OR OF HURTING YOURSELF: NOT AT ALL

## 2024-09-12 ASSESSMENT — PAIN SCALES - GENERAL
PAINLEVEL: 4
PAINLEVEL_OUTOF10: 4

## 2024-09-12 ASSESSMENT — ENCOUNTER SYMPTOMS
OCCASIONAL FEELINGS OF UNSTEADINESS: 0
LOSS OF SENSATION IN FEET: 0
DEPRESSION: 0

## 2024-09-12 ASSESSMENT — PAIN DESCRIPTION - DESCRIPTORS: DESCRIPTORS: SHARP;SHOOTING

## 2024-09-12 ASSESSMENT — LIFESTYLE VARIABLES
HOW OFTEN DO YOU HAVE A DRINK CONTAINING ALCOHOL: NEVER
HOW OFTEN DO YOU HAVE SIX OR MORE DRINKS ON ONE OCCASION: NEVER
HOW MANY STANDARD DRINKS CONTAINING ALCOHOL DO YOU HAVE ON A TYPICAL DAY: PATIENT DOES NOT DRINK
SKIP TO QUESTIONS 9-10: 1
AUDIT-C TOTAL SCORE: 0
HOW OFTEN DURING THE LAST YEAR HAVE YOU HAD A FEELING OF GUILT OR REMORSE AFTER DRINKING: NEVER
HAS A RELATIVE, FRIEND, DOCTOR, OR ANOTHER HEALTH PROFESSIONAL EXPRESSED CONCERN ABOUT YOUR DRINKING OR SUGGESTED YOU CUT DOWN: NO
HOW OFTEN DURING THE LAST YEAR HAVE YOU FOUND THAT YOU WERE NOT ABLE TO STOP DRINKING ONCE YOU HAD STARTED: NEVER
AUDIT TOTAL SCORE: 0
HOW OFTEN DURING THE LAST YEAR HAVE YOU NEEDED AN ALCOHOLIC DRINK FIRST THING IN THE MORNING TO GET YOURSELF GOING AFTER A NIGHT OF HEAVY DRINKING: NEVER
HAVE YOU OR SOMEONE ELSE BEEN INJURED AS A RESULT OF YOUR DRINKING: NO
HOW OFTEN DURING THE LAST YEAR HAVE YOU FAILED TO DO WHAT WAS NORMALLY EXPECTED FROM YOU BECAUSE OF DRINKING: NEVER
HOW OFTEN DURING THE LAST YEAR HAVE YOU BEEN UNABLE TO REMEMBER WHAT HAPPENED THE NIGHT BEFORE BECAUSE YOU HAD BEEN DRINKING: NEVER

## 2024-09-12 ASSESSMENT — PAIN - FUNCTIONAL ASSESSMENT: PAIN_FUNCTIONAL_ASSESSMENT: 0-10

## 2024-09-26 ENCOUNTER — SPECIALTY PHARMACY (OUTPATIENT)
Dept: PHARMACY | Facility: CLINIC | Age: 49
End: 2024-09-26

## 2024-09-27 DIAGNOSIS — R35.0 URINARY FREQUENCY: ICD-10-CM

## 2024-09-30 RX ORDER — MIRABEGRON 50 MG/1
50 TABLET, FILM COATED, EXTENDED RELEASE ORAL DAILY
Qty: 30 TABLET | Refills: 11 | Status: SHIPPED | OUTPATIENT
Start: 2024-09-30 | End: 2025-09-30

## 2024-10-01 ENCOUNTER — APPOINTMENT (OUTPATIENT)
Dept: GASTROENTEROLOGY | Facility: CLINIC | Age: 49
End: 2024-10-01
Payer: COMMERCIAL

## 2024-10-01 VITALS — HEIGHT: 68 IN | HEART RATE: 80 BPM | BODY MASS INDEX: 24.86 KG/M2 | WEIGHT: 164 LBS

## 2024-10-01 DIAGNOSIS — K59.04 CHRONIC IDIOPATHIC CONSTIPATION: Primary | ICD-10-CM

## 2024-10-01 DIAGNOSIS — R10.84 GENERALIZED ABDOMINAL PAIN: ICD-10-CM

## 2024-10-01 PROCEDURE — 99213 OFFICE O/P EST LOW 20 MIN: CPT | Performed by: INTERNAL MEDICINE

## 2024-10-01 PROCEDURE — 3008F BODY MASS INDEX DOCD: CPT | Performed by: INTERNAL MEDICINE

## 2024-10-01 PROCEDURE — RXMED WILLOW AMBULATORY MEDICATION CHARGE

## 2024-10-01 NOTE — PROGRESS NOTES
REASON FOR VISIT: Follow-up constipation    HPI:  Florence Albarran is a 48 y.o. female with a history of chronic idiopathic constipation here for follow-up.  Has tried multiple therapies in the past including Linzess, Trulance, Motegrity, Amitiza without significant relief.  Most recently now on MiraLAX and takes 2 doses at once once a day which has helped give her several bowel movements a day.  Most bowel movements are rather loose or watery.  Feels that her abdominal cramping is best with this regimen.  Not using any fiber supplementation.  Has undergone colonoscopy most recently 2 years ago without any findings.  Has not had any rectal bleeding.          PRIOR ENDOSCOPY    PAST MEDICAL HISTORY  Past Medical History:   Diagnosis Date    Acute atopic conjunctivitis, unspecified eye 12/20/2017    Allergic conjunctivitis    Acute candidiasis of vulva and vagina 04/02/2015    Vaginal yeast infection    Anogenital (venereal) warts 02/24/2016    Genital warts    Anogenital (venereal) warts 01/20/2016    Genital warts    Anogenital (venereal) warts 11/18/2015    Genital warts    Body mass index (BMI) 27.0-27.9, adult 09/19/2018    BMI 27.0-27.9,adult    Calculus of kidney 11/10/2017    Kidney stone on left side    Cervicalgia 06/01/2018    Cervicalgia of nzektamh-qrmcdaz-ecqfh region    Cervicalgia 03/16/2018    Cervicalgia of waasaxpx-mwgbwlo-yhgcx region    Cervicalgia 07/20/2018    Cervicalgia of hjfdekcv-mcoxbdc-nwgdg region    Cervicalgia 05/08/2018    Cervicalgia of hcjteacu-hqafkif-wqvak region    Cervicalgia 08/10/2018    Cervicalgia of dqtofysa-zqgtvhr-xwvuq region    Chronic migraine without aura, intractable, with status migrainosus 06/01/2018    Chronic migraine without aura, with intractable migraine, so stated, with status migrainosus    Chronic migraine without aura, intractable, with status migrainosus 07/20/2018    Chronic migraine without aura, with intractable migraine, so stated, with status  migrainosus    Chronic migraine without aura, intractable, with status migrainosus 05/08/2018    Chronic migraine without aura, with intractable migraine, so stated, with status migrainosus    Chronic migraine without aura, intractable, with status migrainosus 08/10/2018    Chronic migraine without aura, with intractable migraine, so stated, with status migrainosus    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 10/05/2018    Vitamin B 12 deficiency    Encounter for gynecological examination (general) (routine) without abnormal findings 11/10/2017    Well woman exam with routine gynecological exam    Encounter for other preprocedural examination 07/29/2019    Preoperative clearance    Encounter for screening for infections with a predominantly sexual mode of transmission 06/01/2018    Screening for STDs (sexually transmitted diseases)    Encounter for screening for infections with a predominantly sexual mode of transmission 06/01/2018    Routine screening for STI (sexually transmitted infection)    Encounter for screening for infections with a predominantly sexual mode of transmission 07/20/2018    Screening for STDs (sexually transmitted diseases)    Encounter for screening for infections with a predominantly sexual mode of transmission 07/20/2018    Routine screening for STI (sexually transmitted infection)    Encounter for screening for infections with a predominantly sexual mode of transmission 05/08/2018    Screening for STDs (sexually transmitted diseases)    Encounter for screening for infections with a predominantly sexual mode of transmission 05/08/2018    Routine screening for STI (sexually transmitted infection)    Encounter for screening for infections with a predominantly sexual mode of transmission 08/10/2018    Screening for STDs (sexually transmitted diseases)    Encounter for screening for infections with a predominantly sexual mode of transmission 08/10/2018    Routine screening for STI (sexually  transmitted infection)    Encounter for screening for infections with a predominantly sexual mode of transmission     Screening for STDs (sexually transmitted diseases)    Encounter for screening for infections with a predominantly sexual mode of transmission 11/10/2017    Routine screening for STI (sexually transmitted infection)    Encounter for screening for malignant neoplasm of colon 11/05/2017    Colon cancer screening    Encounter for screening for malignant neoplasm of colon 09/26/2018    Colon cancer screening    Encounter for screening mammogram for malignant neoplasm of breast 06/01/2018    Encounter for screening mammogram for breast cancer    Encounter for screening mammogram for malignant neoplasm of breast 07/20/2018    Encounter for screening mammogram for breast cancer    Encounter for screening mammogram for malignant neoplasm of breast 05/08/2018    Encounter for screening mammogram for breast cancer    Encounter for screening mammogram for malignant neoplasm of breast 08/10/2018    Encounter for screening mammogram for breast cancer    Fever, unspecified 09/10/2018    Febrile illness    Left upper quadrant pain 06/16/2020    Colicky LUQ abdominal pain    Meibomian gland dysfunction of right eye, unspecified eyelid 08/10/2018    Meibomian gland dysfunction (MGD) of right eye    Migraine, unspecified, not intractable, without status migrainosus 08/10/2018    Migraine headache    Ocular pain, left eye 09/28/2018    Ocular pain, left eye    Ocular pain, right eye 09/28/2018    Ocular pain, right eye    Other specified noninflammatory disorders of vulva and perineum 02/24/2016    Vulvar lesion    Pain disorder with related psychological factors 10/01/2018    Pain disorder associated with psychological and physical factors    Pain in unspecified shoulder 06/04/2021    Shoulder pain    Personal history of diseases of the skin and subcutaneous tissue 09/18/2017    History of cellulitis    Personal  history of other diseases of the digestive system 03/28/2017    History of constipation    Personal history of other diseases of the respiratory system 09/10/2018    History of pharyngitis    Personal history of other diseases of the respiratory system 09/11/2018    History of sinusitis    Personal history of other infectious and parasitic diseases 02/26/2016    History of genital warts    Personal history of other specified conditions 10/05/2018    History of nausea    Personal history of other specified conditions 03/28/2017    History of epigastric pain    Personal history of other specified conditions 10/23/2017    History of dysuria    Personal history of other specified conditions 09/19/2018    History of epigastric pain    Personal history of other specified conditions 09/03/2019    History of palpitations    Personal history of other specified conditions 12/20/2017    History of palpitations    Personal history of urinary (tract) infections 05/29/2015    History of urinary tract infection    Personal history of urinary (tract) infections 04/20/2017    History of urinary tract infection    Personal history of urinary calculi 12/08/2017    History of renal calculi    Right knee pain     Unspecified condition associated with female genital organs and menstrual cycle 02/24/2016    Genital lesion, female    Urinary tract infection, site not specified 04/16/2019    Acute UTI    Urinary tract infection, site not specified 04/16/2021    Acute UTI    Urinary tract infection, site not specified 09/11/2018    Acute UTI       PAST SURGICAL HISTORY  Past Surgical History:   Procedure Laterality Date    DILATION AND CURETTAGE OF UTERUS  06/15/2015    Dilation And Curettage Of Cervical Stump    LAPAROSCOPY DIAGNOSTIC / BIOPSY / ASPIRATION / LYSIS  06/15/2015    Exploratory Laparoscopy    MR HEAD ANGIO WO IV CONTRAST  11/17/2019    MR HEAD ANGIO WO IV CONTRAST 11/17/2019 [facility]    MR NECK ANGIO WO IV CONTRAST   11/17/2019    MR NECK ANGIO WO IV CONTRAST 11/17/2019 [facility]       FAMILY HISTORY  Family History   Problem Relation Name Age of Onset    Lymphoma Mother      No Known Problems Father         SOCIAL HISTORY  Social History     Tobacco Use    Smoking status: Never    Smokeless tobacco: Never   Substance Use Topics    Alcohol use: Not Currently       REVIEW OF SYSTEMS  CONSTITUTIONAL: negative for fever, chills, fatigue, or unintentional weight loss,   HEENT: negative for icteric sclera, eye pain/redness, or changes in vision/hearing  RESPIRATORY: negative for cough, hemoptysis, wheezing, orthopnea, or dyspnea on exertion  CARDIOVASCULAR: negative for chest pain, palpitations, or syncope   GASTROINTESTINAL: as noted per HPI  GENITOURINARY: negative for dysuria, polyuria, incontinence, or hematuria  MUSCULOSKELETAL: negative for arthralgia, myalgia, or joint swelling/stiffness   INTEGUMENTARY/SKIN: negative jaundice, rash, or skin lesion  HEMATOLOGIC/LYMPHATIC: negative for prolonged bleeding, easy bruising, or swollen lymph nodes  ENDOCRINE: negative for cold/heat intolerance, polydipsia, polyuria, or goiter  NEUROLOGIC: negative for headaches, dizziness, tremor, or gait abnormality  PSYCHIATRIC: negative for anxiety, depression, personality changes, or sleep disturbances      A 10 point review of systems was completed and was otherwise negative.    ALLERGIES  No Known Allergies    MEDICATIONS  Current Outpatient Medications   Medication Sig Dispense Refill    aspirin-acetaminophen-caffeine (Excedrin Extra Strength) 250-250-65 mg tablet Take 2 tablets by mouth once daily.      baclofen (Lioresal) 10 mg tablet Take 1 tablet (10 mg) by mouth 3 times a day. 270 tablet 3    fremanezumab (Ajovy Autoinjector) 225 mg/1.5 mL auto-injector INJECT 225 MG (1 PEN) UNDER THE SKIN ONCE A MONTH AS DIRECTED. 4.5 mL 2    gabapentin (Neurontin) 600 mg tablet Take 1 tablet (600 mg) by mouth once daily. 90 tablet 3     latanoprost (Xalatan) 0.005 % ophthalmic solution Administer 1 drop into affected eye(s) twice a day.      metoprolol succinate XL (Toprol-XL) 50 mg 24 hr tablet Take 1 tablet (50 mg) by mouth once daily. Do not crush or chew. 90 tablet 3    mirabegron (Myrbetriq) 50 mg tablet extended release 24 hr 24 hr tablet Take 1 tablet (50 mg) by mouth once daily. 30 tablet 11    omeprazole (PriLOSEC) 20 mg DR capsule Take 1 capsule (20 mg) by mouth once daily in the morning. Take before meals. 90 capsule 3    onabotulinumtoxinA (Botox) 100 unit injection PHYSICIAN TO INJECT 200 UNITS INTRAMUSCULARLY TO HEAD AND NECK EVERY 90 DAYS 2 each 3    ondansetron (Zofran) 8 mg tablet TAKE ONE (1) TABLET BY MOUTH EVERY 8 HOURS AS NEEDED. 30 tablet 6    oxybutynin XL (Ditropan-XL) 10 mg 24 hr tablet Take 1 tablet (10 mg) by mouth once daily. 90 tablet 3    plecanatide (Trulance) tablet tablet Take 1 tablet (3 mg) by mouth once daily.      rimegepant (Nurtec ODT) 75 mg tablet,disintegrating Allow one (1) tablet to dissolve on or under the tongue once daily as needed for migraine. 8 tablet 11    rizatriptan MLT (Maxalt-MLT) 10 mg disintegrating tablet allow 1 tablet by mouth to dissolve on or under the tongue at the onset of headache. may repeat every 2 hours as needed. maximum of 3 tablets in 24 hours 12 tablet 11    tirzepatide (Mounjaro) 5 mg/0.5 mL pen injector Inject 5 mg under the skin every 7 days.      triamcinolone (Kenalog) 0.1 % cream       ubrogepant (Ubrelvy) 100 mg tablet tablet Take 1 tablet (100 mg) by mouth 1 time if needed (migraine). MAY REPEAT IN 2 HOURS. MAX DOSE 2 TABLETS IN 24 HOURS. 10 tablet 6    cyanocobalamin (Vitamin B-12) 1,000 mcg/mL injection Additional Instructions: INJECT 1ML INTRAMUSCULARLY WEEKLY. x 4 injections then monthly      loteprednol (Lotemax) 0.5 % ophthalmic suspension INSTILL 1 TO 2 DROPS THREE TIMES DAILY TO BOTH EYES. 5 mL 6    methocarbamol (Robaxin) 500 mg tablet take 1 to 2 tablets by  "mouth at bedtime       No current facility-administered medications for this visit.       VITALS  Pulse 80   Ht 1.727 m (5' 8\")   Wt 74.4 kg (164 lb)   BMI 24.94 kg/m²      PHYSICAL EXAM  Alert and oriented in no acute distress    ASSESSMENT/ PLAN  Patient with chronic idiopathic constipation has tried multiple therapies including Linzess, Trulance, Motegrity, Amitiza without any relief.  Now on MiraLAX.  I suggested spacing her MiraLAX twice daily 12 hours apart to see if that results in less watery stools.  I also asked her to add fiber supplementation with Benefiber Metamucil once a day.  She is in agreement with the plan.  We reviewed most recent colonoscopy without significant findings 2 years ago.  She is in agreement with the plan will see me back in the office as needed.        Signature: Kanika Baker MD    Date: 10/1/2024  Time: 2:23 PM    "

## 2024-10-03 ENCOUNTER — SPECIALTY PHARMACY (OUTPATIENT)
Dept: PHARMACY | Facility: CLINIC | Age: 49
End: 2024-10-03

## 2024-10-04 ENCOUNTER — PHARMACY VISIT (OUTPATIENT)
Dept: PHARMACY | Facility: CLINIC | Age: 49
End: 2024-10-04
Payer: COMMERCIAL

## 2024-10-07 DIAGNOSIS — K30 FUNCTIONAL DYSPEPSIA: ICD-10-CM

## 2024-10-07 DIAGNOSIS — R00.0 TACHYCARDIA: ICD-10-CM

## 2024-10-07 RX ORDER — METOPROLOL SUCCINATE 50 MG/1
50 TABLET, EXTENDED RELEASE ORAL DAILY
Qty: 90 TABLET | Refills: 3 | Status: SHIPPED | OUTPATIENT
Start: 2024-10-07 | End: 2024-10-10 | Stop reason: SDUPTHER

## 2024-10-07 RX ORDER — OMEPRAZOLE 20 MG/1
20 CAPSULE, DELAYED RELEASE ORAL
Qty: 90 CAPSULE | Refills: 3 | Status: SHIPPED | OUTPATIENT
Start: 2024-10-07 | End: 2024-10-10 | Stop reason: SDUPTHER

## 2024-10-08 PROCEDURE — RXMED WILLOW AMBULATORY MEDICATION CHARGE

## 2024-10-09 ENCOUNTER — PHARMACY VISIT (OUTPATIENT)
Dept: PHARMACY | Facility: CLINIC | Age: 49
End: 2024-10-09
Payer: COMMERCIAL

## 2024-10-10 ENCOUNTER — APPOINTMENT (OUTPATIENT)
Dept: PRIMARY CARE | Facility: CLINIC | Age: 49
End: 2024-10-10
Payer: COMMERCIAL

## 2024-10-10 VITALS
WEIGHT: 158.2 LBS | BODY MASS INDEX: 23.98 KG/M2 | SYSTOLIC BLOOD PRESSURE: 101 MMHG | HEIGHT: 68 IN | DIASTOLIC BLOOD PRESSURE: 69 MMHG | HEART RATE: 97 BPM | OXYGEN SATURATION: 98 %

## 2024-10-10 DIAGNOSIS — G47.33 OBSTRUCTIVE SLEEP APNEA SYNDROME: ICD-10-CM

## 2024-10-10 DIAGNOSIS — R53.83 OTHER FATIGUE: ICD-10-CM

## 2024-10-10 DIAGNOSIS — M62.838 NECK MUSCLE SPASM: ICD-10-CM

## 2024-10-10 DIAGNOSIS — Z00.00 WELL ADULT EXAM: Primary | ICD-10-CM

## 2024-10-10 DIAGNOSIS — Z12.31 ENCOUNTER FOR SCREENING MAMMOGRAM FOR MALIGNANT NEOPLASM OF BREAST: ICD-10-CM

## 2024-10-10 DIAGNOSIS — R00.0 TACHYCARDIA: ICD-10-CM

## 2024-10-10 DIAGNOSIS — K30 FUNCTIONAL DYSPEPSIA: ICD-10-CM

## 2024-10-10 DIAGNOSIS — R10.2 PELVIC PAIN IN FEMALE: ICD-10-CM

## 2024-10-10 PROCEDURE — 3008F BODY MASS INDEX DOCD: CPT | Performed by: INTERNAL MEDICINE

## 2024-10-10 PROCEDURE — 1036F TOBACCO NON-USER: CPT | Performed by: INTERNAL MEDICINE

## 2024-10-10 PROCEDURE — 99396 PREV VISIT EST AGE 40-64: CPT | Performed by: INTERNAL MEDICINE

## 2024-10-10 RX ORDER — OMEPRAZOLE 20 MG/1
20 CAPSULE, DELAYED RELEASE ORAL
Qty: 90 CAPSULE | Refills: 3 | Status: SHIPPED | OUTPATIENT
Start: 2024-10-10 | End: 2025-10-10

## 2024-10-10 RX ORDER — METOPROLOL SUCCINATE 50 MG/1
50 TABLET, EXTENDED RELEASE ORAL DAILY
Qty: 90 TABLET | Refills: 3 | Status: SHIPPED | OUTPATIENT
Start: 2024-10-10 | End: 2025-10-10

## 2024-10-10 ASSESSMENT — PATIENT HEALTH QUESTIONNAIRE - PHQ9
SUM OF ALL RESPONSES TO PHQ9 QUESTIONS 1 AND 2: 0
1. LITTLE INTEREST OR PLEASURE IN DOING THINGS: NOT AT ALL
2. FEELING DOWN, DEPRESSED OR HOPELESS: NOT AT ALL

## 2024-10-10 NOTE — PROGRESS NOTES
"Subjective   Patient ID: Florence Albarran is a 48 y.o. female who presents for Annual Exam.    HPI     Has chronic right sided neck pain  Neurologist has done botox in it and hasn't helped. Muscle relaxers havent helped.  Taking ibuprofen 800mg 2-3x/day. Going on for very long time but getting worse since last visit. Has to get up at work and walk around when it really bothers her.  No numbness, weakness, tingling down the arms.    Fatigued a lot.  Not much exercise.  Has h/o MIKA, has CPAP at home she never really used. Last sleep study many years ago.  Sleeps 7-8pm to 5am. Falls asleep ok but wake up throughout the night and trouble falling back asleep. Wakes up to urinate. Follows with Urology.    Has chronic bowel issues. Has seen GI. On miralax BID which controls bowels. But does report infrequently gets a stabbing pain near rectum/pelvis, lasts 30 minutes or so then resolves.    Palpitations controlled on metoprolol.  No lightheadedness.    Remains on [facility]      Review of Systems    Objective   /69   Pulse 97   Ht 1.727 m (5' 8\")   Wt 71.8 kg (158 lb 3.2 oz)   SpO2 98%   BMI 24.05 kg/m²     Physical Exam  Constitutional:       Appearance: Normal appearance.   HENT:      Right Ear: Tympanic membrane and ear canal normal.      Left Ear: Tympanic membrane and ear canal normal.      Mouth/Throat:      Mouth: Mucous membranes are moist.   Eyes:      Extraocular Movements: Extraocular movements intact.      Pupils: Pupils are equal, round, and reactive to light.   Cardiovascular:      Rate and Rhythm: Normal rate and regular rhythm.      Heart sounds: No murmur heard.  Pulmonary:      Effort: Pulmonary effort is normal.      Breath sounds: Normal breath sounds.   Abdominal:      General: Bowel sounds are normal.      Palpations: Abdomen is soft.      Tenderness: There is no abdominal tenderness.   Musculoskeletal:         General: No deformity.      Cervical back: Neck supple.      Right lower leg: No " edema.      Left lower leg: No edema.      Comments: R paraspinal cervical muscles tense compared to L  Cervical spine nontender   Skin:     Findings: No lesion or rash.   Neurological:      Mental Status: She is alert.      Cranial Nerves: No cranial nerve deficit.      Motor: No weakness.      Gait: Gait normal.         Assessment/Plan   Problem List Items Addressed This Visit             ICD-10-CM    Fatigue R53.83    Obstructive sleep apnea syndrome G47.33    Relevant Orders    Home sleep apnea test (HSAT)    Well adult exam - Primary Z00.00    Relevant Orders    CBC    Comprehensive Metabolic Panel    Lipid Panel    Neck muscle spasm M62.838    Relevant Orders    Referral to Physical Therapy     Other Visit Diagnoses         Codes    Pelvic pain in female     R10.2    Relevant Orders    Referral to Physical Therapy    Functional dyspepsia     K30    Relevant Medications    omeprazole (PriLOSEC) 20 mg DR capsule    Tachycardia     R00.0    Relevant Medications    metoprolol succinate XL (Toprol-XL) 50 mg 24 hr tablet    Encounter for screening mammogram for malignant neoplasm of breast     Z12.31    Relevant Orders    BI mammo bilateral screening tomosynthesis               GERD, IBS - Seeing GI. Had normal EGD 3/2022.     Elevated BP, palpitations - continue metoprolol     Migraines - Seeing Neuro     Obesity - On mounjaro through weight loss clinic.     OAB - continue oxybutynin    Pelvic pain - referred for pelvic floor therapy    Muscular neck pain - referred to PT    Fatigue, h/o MIKA - repeat sleep study and if MIKA present will order new CPAP     Health maintenance  -Colonoscopy 8/2022 normal  -Follows with Gyn  -Mammogram - ordered  -Immunizations   -Tdap 2017   -Flu gets through work

## 2024-10-21 ENCOUNTER — CLINICAL SUPPORT (OUTPATIENT)
Dept: SLEEP MEDICINE | Facility: HOSPITAL | Age: 49
End: 2024-10-21
Payer: COMMERCIAL

## 2024-10-21 DIAGNOSIS — G47.9 SLEEP DISORDER, UNSPECIFIED: ICD-10-CM

## 2024-10-21 DIAGNOSIS — G47.33 OBSTRUCTIVE SLEEP APNEA SYNDROME: ICD-10-CM

## 2024-10-21 PROCEDURE — 95806 SLEEP STUDY UNATT&RESP EFFT: CPT | Performed by: GENERAL PRACTICE

## 2024-10-21 NOTE — PROGRESS NOTES
Type of Study: HOME SLEEP STUDY - NOMAD     The patient received equipment and instructions for use of the [facility] HSAT device. The patient was instructed how to apply the effort belts, cannula, thermistor. It was also explained how the Nomad and oximeter components work.  The patient was asked to record their sleep for an 8-hour period.     The patient was informed of their responsibility for the device and acknowledged this by signing the HSAT device contract. The patient was asked to return the device on 10/22/2024 between the hours of [id number] to the [facility].     The patient was instructed to call 911 as usual for any medical- emergencies while at home.  The patient was also given a phone number for troubleshooting when using the device in case there were additional questions.

## 2024-10-22 ENCOUNTER — LAB (OUTPATIENT)
Dept: LAB | Facility: LAB | Age: 49
End: 2024-10-22
Payer: COMMERCIAL

## 2024-10-22 DIAGNOSIS — Z00.00 WELL ADULT EXAM: ICD-10-CM

## 2024-10-22 LAB
ALBUMIN SERPL BCP-MCNC: 3.8 G/DL (ref 3.4–5)
ALP SERPL-CCNC: 51 U/L (ref 33–110)
ALT SERPL W P-5'-P-CCNC: 8 U/L (ref 7–45)
ANION GAP SERPL CALC-SCNC: 9 MMOL/L (ref 10–20)
AST SERPL W P-5'-P-CCNC: 9 U/L (ref 9–39)
BILIRUB SERPL-MCNC: 0.5 MG/DL (ref 0–1.2)
BUN SERPL-MCNC: 13 MG/DL (ref 6–23)
CALCIUM SERPL-MCNC: 8.9 MG/DL (ref 8.6–10.6)
CHLORIDE SERPL-SCNC: 109 MMOL/L (ref 98–107)
CHOLEST SERPL-MCNC: 156 MG/DL (ref 0–199)
CHOLESTEROL/HDL RATIO: 3.3
CO2 SERPL-SCNC: 26 MMOL/L (ref 21–32)
CREAT SERPL-MCNC: 0.77 MG/DL (ref 0.5–1.05)
EGFRCR SERPLBLD CKD-EPI 2021: >90 ML/MIN/1.73M*2
ERYTHROCYTE [DISTWIDTH] IN BLOOD BY AUTOMATED COUNT: 11.9 % (ref 11.5–14.5)
GLUCOSE SERPL-MCNC: 89 MG/DL (ref 74–99)
HCT VFR BLD AUTO: 41.5 % (ref 36–46)
HDLC SERPL-MCNC: 46.8 MG/DL
HGB BLD-MCNC: 13.9 G/DL (ref 12–16)
LDLC SERPL CALC-MCNC: 94 MG/DL
MCH RBC QN AUTO: 31.5 PG (ref 26–34)
MCHC RBC AUTO-ENTMCNC: 33.5 G/DL (ref 32–36)
MCV RBC AUTO: 94 FL (ref 80–100)
NON HDL CHOLESTEROL: 109 MG/DL (ref 0–149)
NRBC BLD-RTO: 0 /100 WBCS (ref 0–0)
PLATELET # BLD AUTO: 240 X10*3/UL (ref 150–450)
POTASSIUM SERPL-SCNC: 4.3 MMOL/L (ref 3.5–5.3)
PROT SERPL-MCNC: 6.3 G/DL (ref 6.4–8.2)
RBC # BLD AUTO: 4.41 X10*6/UL (ref 4–5.2)
SODIUM SERPL-SCNC: 140 MMOL/L (ref 136–145)
TRIGL SERPL-MCNC: 76 MG/DL (ref 0–149)
VLDL: 15 MG/DL (ref 0–40)
WBC # BLD AUTO: 6.5 X10*3/UL (ref 4.4–11.3)

## 2024-10-22 PROCEDURE — 85027 COMPLETE CBC AUTOMATED: CPT

## 2024-10-22 PROCEDURE — 80053 COMPREHEN METABOLIC PANEL: CPT

## 2024-10-22 PROCEDURE — 36415 COLL VENOUS BLD VENIPUNCTURE: CPT

## 2024-10-22 PROCEDURE — 80061 LIPID PANEL: CPT

## 2024-10-25 ENCOUNTER — PHARMACY VISIT (OUTPATIENT)
Dept: PHARMACY | Facility: CLINIC | Age: 49
End: 2024-10-25
Payer: COMMERCIAL

## 2024-10-25 PROCEDURE — RXMED WILLOW AMBULATORY MEDICATION CHARGE

## 2024-10-30 PROCEDURE — RXMED WILLOW AMBULATORY MEDICATION CHARGE

## 2024-10-31 ENCOUNTER — PHARMACY VISIT (OUTPATIENT)
Dept: PHARMACY | Facility: CLINIC | Age: 49
End: 2024-10-31
Payer: COMMERCIAL

## 2024-11-01 PROCEDURE — RXMED WILLOW AMBULATORY MEDICATION CHARGE

## 2024-11-04 ENCOUNTER — SPECIALTY PHARMACY (OUTPATIENT)
Dept: PHARMACY | Facility: CLINIC | Age: 49
End: 2024-11-04

## 2024-11-04 PROCEDURE — RXMED WILLOW AMBULATORY MEDICATION CHARGE

## 2024-11-06 ENCOUNTER — PHARMACY VISIT (OUTPATIENT)
Dept: PHARMACY | Facility: CLINIC | Age: 49
End: 2024-11-06
Payer: COMMERCIAL

## 2024-11-08 ENCOUNTER — PHARMACY VISIT (OUTPATIENT)
Dept: PHARMACY | Facility: CLINIC | Age: 49
End: 2024-11-08
Payer: COMMERCIAL

## 2024-11-08 ENCOUNTER — SPECIALTY PHARMACY (OUTPATIENT)
Dept: PHARMACY | Facility: CLINIC | Age: 49
End: 2024-11-08

## 2024-11-08 PROCEDURE — RXMED WILLOW AMBULATORY MEDICATION CHARGE

## 2024-11-11 DIAGNOSIS — G43.709 CHRONIC MIGRAINE WITHOUT AURA WITHOUT STATUS MIGRAINOSUS, NOT INTRACTABLE: ICD-10-CM

## 2024-11-11 PROCEDURE — RXMED WILLOW AMBULATORY MEDICATION CHARGE

## 2024-11-11 RX ORDER — DIHYDROERGOTAMINE MESYLATE 4 MG/ML
1 SPRAY, METERED NASAL
Qty: 8 EACH | Refills: 6 | Status: SHIPPED | OUTPATIENT
Start: 2024-11-11

## 2024-11-12 ENCOUNTER — PHARMACY VISIT (OUTPATIENT)
Dept: PHARMACY | Facility: CLINIC | Age: 49
End: 2024-11-12
Payer: COMMERCIAL

## 2024-11-18 ENCOUNTER — SPECIALTY PHARMACY (OUTPATIENT)
Dept: PHARMACY | Facility: CLINIC | Age: 49
End: 2024-11-18

## 2024-11-19 ENCOUNTER — EVALUATION (OUTPATIENT)
Dept: PHYSICAL THERAPY | Facility: HOSPITAL | Age: 49
End: 2024-11-19
Payer: COMMERCIAL

## 2024-11-19 DIAGNOSIS — G44.209 TENSION-TYPE HEADACHE, NOT INTRACTABLE: ICD-10-CM

## 2024-11-19 DIAGNOSIS — M62.838 NECK MUSCLE SPASM: Primary | ICD-10-CM

## 2024-11-19 PROCEDURE — 97161 PT EVAL LOW COMPLEX 20 MIN: CPT | Mod: GP | Performed by: PHYSICAL THERAPIST

## 2024-11-19 PROCEDURE — 97140 MANUAL THERAPY 1/> REGIONS: CPT | Mod: GP | Performed by: PHYSICAL THERAPIST

## 2024-11-19 PROCEDURE — 97110 THERAPEUTIC EXERCISES: CPT | Mod: GP | Performed by: PHYSICAL THERAPIST

## 2024-11-19 PROCEDURE — RXMED WILLOW AMBULATORY MEDICATION CHARGE

## 2024-11-19 ASSESSMENT — ENCOUNTER SYMPTOMS
DEPRESSION: 0
LOSS OF SENSATION IN FEET: 0
OCCASIONAL FEELINGS OF UNSTEADINESS: 0

## 2024-11-19 NOTE — PROGRESS NOTES
Initial evaluation  Physical Therapy Initial Evaluation    Patient Name:Florence Albarran  MRN:[id number]  Today's Date:11/19/2024  Referred by: Shine Tan     Time In: 1641  Time Out: 1725  Total Time: 44 minutes    Therapy Diagnosis  1. Neck muscle spasm    2.     Tension type headaches    Plan of Care  Planned interventions include PRN: therapeutic exercise, manual therapy, gait training, electrical stimulation, hot pack,  HEP training.   Frequency and duration: 1-2 time(s) a week, for  12 weeks or  16  visits .   Plan of care was developed with input and agreement by the patient.     Plan for next session:   Review snag exercise with towel  Add band resisted cervical retraction  Scapular retraction with ER, band wall slided  Band press and circles  Scap mobility flexion/abduction    Assessment  Pt presents with several deficits as outlined below that are significantly affecting work and overall function. She displays weakness of DNF, brigida-scapular and RTC musculature, joint and muscular restrictions in cervical/thoracic and scapulothoracic regions affecting postural alignment. Significant mobility restrictions in upper cervical spine with associated tension in suboccipitals, R UT and levator scap with associated cervicogenic headaches are affecting her ability to stay comfortable and focus during work shifts, affecting comfort at home and sleep. Pt will benefit from skilled PT in order to address the above mentioned deficits so that she can ultimately achieve all LTG as set in POC and return to a more pain-free, active lifestyle.     Problem List: Pain, range of motion/joint mobility, strength, activity limitations, ADLs/IADLs/self care skills, decreased functional level, decreased knowledge of HEP, edema, flexibility, and posture.     Patient is a 48 y.o. female who presents with complaint of R cervicalgia with spasms, periodic tension headaches . Standardized testing and measures administered today reveal  that the patient has multiple impairments in body structures and functions, activity limitations, and participation restrictions. These include subjective and objective findings such as pain, tenderness to palpation of the affected area, decreased ROM, strength, flexibility, and function. The patient's impairments are likely influenced by mechanical dysfunction and deconditioning with possible overuse and degenerative changes. Skilled PT services are warranted in order to realize measurable and meaningful change in the above outcome measures and achieve improvements in the patient's functional status and individual goals.    Rehab Potential:good    Clinical Presentation: Stable and/or uncomplicated characteristics.     Evaluation Complexity: Low    Precautions/Fall Risk: none  Pacemaker no  Seizures No  Post Op Movement/Restrictions No    Insurance  Visit number: 1   Approved number of visits: TBD  Onset Date: 10/10/24  Certification Period:  Beginnin2024              Payor:  EMPLOYEE MEDICAL PLAN / Plan:  EMPLOYEE MEDICAL PLAN CONSUMER [employer] / Product Type: *No Product type* /     Subjective  Chief complaint/reason for visit: R cervicalgia with periodic tension headaches   Mechanism of Injury:  a progressive, insidious condition  Location of Pain: R UT, R cervical spine into suboccipital region  Current Pain Level (0-10): 3   High Pain Level (0-10): 8   Low Pain Level (0-10): 3  Pain Quality: aching, pressure, tightness, and throbbing; always there sometimes worse than others  Pain Exacerbating Factors:  working at her desk using mouse on R side  Pain Relieving Factors: medications prescribed pain medications and biofreeze and motrin, heating pad at home    Medical Screening: Reviewed medical history form with patient and medical screening assessed.   Red Flags: Do you have any of the following? No  Fever/chills, unexplained weight changes, dizziness/fainting, unexplained change in bowel or  bladder functions, unexplained malaise or muscle weakness, night pain/sweats, numbness or tingling  Current Medical Management: takes OTC medication, uses biofreeze and other modalities  Prior Level of Function (PLOF)  Patient previously independent with all ADLs  Exercise/Physical Activity: stretching at home  Functional limitations: decreased positional tolerances to standing, sitting, walking, bending, driving, stairs, reaching, self-care activities, work related tasks, push/pull activities, participation in home management/duties, participation in leisure activities and social activities  Work Status: full time job doing nurse manager  Current Status: remain unchanged  Patient Awareness: Patient is aware of  her diagnosis and prognosis.   Living Environment: house  Social Support: children / family / friends to help, patient and daughter  Personal Factors That May Impact Care: work schedule  Patient's Goal for Treatment: relieving pain, increasing strength, increasing mobility, improving positional tolerances, walking with a normal gait, reducing symptoms, returning to work,  and resuming recreational/social activities    Objective   NDI: 25 raw    Posture/Observation: severely fwd head positioning; increased thoracic kyphosis with decreased mobility into extension; R shoulder rests in slight IR and adduction    Palpation: R UT, levator scap, rhomboids severe TTP; C/T junction on R TTP; upper cervical spine TTP R > L    Segmental Mobility: severely decreased upper and lower cervical extension, rotation; decreased PA glide mid to upper thoracic spine limiting extension and upright posture; decreased lateral glide limiting rotational movement      ROM:  Cervical Flexion 45 degrees with increased pain and tension  Cervical extension 30 degrees   Cervical rotation L 60 degrees R 50 degrees with pain and guarding  Cervical sidebending L 25 degrees R 15 degrees with pain and guarding  Thoracic extension max  deficit  Thoracic rotation max deficit  Shoulder flexion WFL but tension/guarding at end range R  Shoulder Abduction WFL but tension/guarding at end range R  Shoulder IR WFL iliana  Shoulder ER L WFL, R 70 degrees    MMT:  DNF 2+/5  Rhomboids 3/5  Mid Trap 3-/5  Lower Trap 3-/5  Lats 3/5  Shoulder flexion L 4/5 R 3+/5  Shoulder abduction L 4/5 R 4-/5  Shoulder IR L 4/5 R 4-/5  Shoulder ER L 4-/5 R 3+/5  Serratus anterior 3+/5 iliana    Special Tests:   Casanova [place]: -  Vertebral Artery: -  Compression: + R  Distraction: alleviates symptoms  DNF endurance: pt fatigues at 2 seconds  Cervical Flexion with rotation: + for cervicogenic headaches    Treatment Performed Today: Initial evaluation and patient education regarding diagnosis, prognosis, contributing factors, comorbidities, importance and instruction of HEP, role of PT, postural re-education, activity modification, and body mechanics.    Therapeutic Exercise   9 minutes  Education/Resources provided today: Home Program   [place]: Provided, reviewed, and performed with the patient the following exercises for HEP:  Access Code: [email or web address]  URL: [email or web address]  Date: 11/19/2024  Prepared by: Shine Cool    Exercises  - Supine Cervical Retraction with Towel  - 1 x daily - 7 x weekly - 1 sets - 10 reps  - Bilateral Shoulder Flexion Wall Slide with Towel  - 1 x daily - 7 x weekly - 1 sets - 10 reps  - Scapular Protraction at Wall  - 1 x daily - 7 x weekly - 2 sets - 10 reps  - Cervical Retraction with Resistance  - 1 x daily - 7 x weekly - 1 sets - 10 reps  - Cervical Extension AROM with Strap  - 1 x daily - 7 x weekly - 1 sets - 10 reps  - Seated Cervical Retraction and Extension  - 1 x daily - 7 x weekly - 1 sets - 5 reps  - Cat Cow to Child's Pose  - 1 x daily - 7 x weekly - 1 sets - 10 reps    Manual Therapy: 15 minutes   Intermittent cervical traction  R UT and levator release  Upper cervical mobilization in sitting   Suboccipital  release       Response to Treatment: improved knowledge and understanding of condition, decreased pain, improved joint mobility/ROM, improved strength, improved flexibility, improved tissue mobility, improved posture, improved balance.    Goals: Goals set and discussed today.   Cervical Spine Goals:  Cervical Spine Goals: By discharge, patient will:  1) Demonstrate independence with home exercise program  2) Tolerate increased exercise without adverse reaction for return to PLOF.   3) Improve spinal posture to demonstrate proper scapular position and posture for increased positional tolerances  4) Increase ROM of the cervical and thoracic spine to WFL in order to improve the ability to perform essential ADLs  5) Increase strength of  DNF, brigida-scapular and RTC musculature to 4/5 grossly in order to improve the ability to perform essential ADLs  6) Report decrease in pain by >= 2 points to meet MCID  7) Decrease score of NDI by >5 points to meet MCID  8) Be able to sleep through the night without disturbances and/or an increase in symptoms  9) Be able to sit at work desk for 60 minute intervals without flare ups in pain or headache symptoms  10) Improve positional tolerances of sitting or standing for 60 minute intervals without headache symptoms or cervicalgia    Patient stated goal:  Pt wishes to improve posture, increase strength and mobility in neck/upper back, get through work shifts without pain or spasm.

## 2024-11-20 ENCOUNTER — PHARMACY VISIT (OUTPATIENT)
Dept: PHARMACY | Facility: CLINIC | Age: 49
End: 2024-11-20
Payer: COMMERCIAL

## 2024-11-22 ENCOUNTER — PROCEDURE VISIT (OUTPATIENT)
Dept: NEUROLOGY | Facility: HOSPITAL | Age: 49
End: 2024-11-22
Payer: COMMERCIAL

## 2024-11-22 VITALS
WEIGHT: 150 LBS | BODY MASS INDEX: 22.73 KG/M2 | HEART RATE: 105 BPM | RESPIRATION RATE: 18 BRPM | DIASTOLIC BLOOD PRESSURE: 89 MMHG | HEIGHT: 68 IN | TEMPERATURE: 97.5 F | SYSTOLIC BLOOD PRESSURE: 125 MMHG

## 2024-11-22 DIAGNOSIS — G43.711 INTRACTABLE CHRONIC MIGRAINE WITHOUT AURA AND WITH STATUS MIGRAINOSUS: ICD-10-CM

## 2024-11-22 PROCEDURE — 96372 THER/PROPH/DIAG INJ SC/IM: CPT | Mod: 59 | Performed by: PSYCHIATRY & NEUROLOGY

## 2024-11-22 PROCEDURE — 2500000004 HC RX 250 GENERAL PHARMACY W/ HCPCS (ALT 636 FOR OP/ED): Performed by: PSYCHIATRY & NEUROLOGY

## 2024-11-22 PROCEDURE — 64615 CHEMODENERV MUSC MIGRAINE: CPT | Performed by: PSYCHIATRY & NEUROLOGY

## 2024-11-22 ASSESSMENT — PAIN SCALES - GENERAL: PAINLEVEL_OUTOF10: 0-NO PAIN

## 2024-11-22 NOTE — PATIENT INSTRUCTIONS
Please do not rub areas for 24 hours.  No pressure above eyebrows for 24 hours.  Watch out for helmets, headlamps, headbands, goggles, or massage for 24 hours.  If there is discomfort, ice for the first 24 hour,s heat after that.  Headaches may worsen, or you may experience neck stiffness.  If this occurs use your usual headache medication or a mild anti inflammatory such as advil or aleve.  Please call if you have difficulty swallowing.     You received Toradol injection in the office today to break a migraine headache cycle. Also called ketorolac . Tomorrow you are to start taking oral Toradol/Ketoralac 4 times daily with food for 5 days.Don't take any other NSAIDs (Aleve, ibuprofen, Motrin, naproxen) during 5 days of Toradol.  Avoid all other rescue medications if possible to break any overuse cycle.

## 2024-11-25 RX ORDER — KETOROLAC TROMETHAMINE 30 MG/ML
60 INJECTION, SOLUTION INTRAMUSCULAR; INTRAVENOUS ONCE
Status: COMPLETED | OUTPATIENT
Start: 2024-11-25 | End: 2024-11-22

## 2024-11-25 RX ORDER — KETOROLAC TROMETHAMINE 10 MG/1
10 TABLET, FILM COATED ORAL EVERY 6 HOURS
Qty: 20 TABLET | Refills: 0 | Status: SHIPPED | OUTPATIENT
Start: 2024-11-25 | End: 2024-11-30

## 2024-11-25 NOTE — PROGRESS NOTES
Patient ID: Florence Albarran is a 48 y.o. female.    Head/Face/Jaw Botulinum Injection    Date/Time: 11/22/2024 5:53 PM    Performed by: Scarlet Munoz MD  Authorized by: Scarlet Munoz MD      Consent:      Consent obtained:  Verbal     Consent given by:  Patient    Procedure details:      EMG used?  No     Electrical stimulation used?  No     Diluted by:  Preservative free saline     Medications: 200 Units onabotulinumtoxinA 100 unit      Comments about vials and dilution:  Spp     Total units available:  200       Ad hoc region injected:  Head see diagram with 200 units     Total units injected:  200     Total units wasted:  0    Post-procedure details:      Patient tolerance of procedure:  Tolerated well, no immediate complications  Please do not rub areas for 24 hours.  No pressure above eyebrows for 24 hours.  Watch out for helmets, headlamps, headbands, goggles, or massage for 24 hours.  If there is discomfort, ice for the first 24 hour,s heat after that.  Headaches may worsen, or you may experience neck stiffness.  If this occurs use your usual headache medication or a mild anti inflammatory such as advil or aleve.  Please call if you have difficulty swallowing.    You received Toradol today for your severe headache.  We are going to continue with 5 day of pills starting tomorrow to break your headache cycle.  Take 1 pill with breakfast lunch dinner and bedtime for 5 days.  Take with food.  Try not to take your triptan or antiinflammatory during this time.  If you have any nausea or diarrhea with the medicine stop and call on the next business day.

## 2024-11-29 DIAGNOSIS — R11.2 NAUSEA AND VOMITING, UNSPECIFIED VOMITING TYPE: ICD-10-CM

## 2024-12-02 RX ORDER — ONDANSETRON HYDROCHLORIDE 8 MG/1
TABLET, FILM COATED ORAL
Qty: 30 TABLET | Refills: 6 | Status: SHIPPED | OUTPATIENT
Start: 2024-12-02 | End: 2025-12-02

## 2024-12-03 ENCOUNTER — SPECIALTY PHARMACY (OUTPATIENT)
Dept: PHARMACY | Facility: CLINIC | Age: 49
End: 2024-12-03

## 2024-12-03 PROCEDURE — RXMED WILLOW AMBULATORY MEDICATION CHARGE

## 2024-12-04 ENCOUNTER — PHARMACY VISIT (OUTPATIENT)
Dept: PHARMACY | Facility: CLINIC | Age: 49
End: 2024-12-04
Payer: COMMERCIAL

## 2024-12-10 PROCEDURE — RXMED WILLOW AMBULATORY MEDICATION CHARGE

## 2024-12-11 ENCOUNTER — PHARMACY VISIT (OUTPATIENT)
Dept: PHARMACY | Facility: CLINIC | Age: 49
End: 2024-12-11
Payer: COMMERCIAL

## 2024-12-13 ENCOUNTER — TREATMENT (OUTPATIENT)
Dept: PHYSICAL THERAPY | Facility: HOSPITAL | Age: 49
End: 2024-12-13
Payer: COMMERCIAL

## 2024-12-13 ENCOUNTER — TELEPHONE (OUTPATIENT)
Dept: NEUROLOGY | Facility: HOSPITAL | Age: 49
End: 2024-12-13

## 2024-12-13 DIAGNOSIS — G44.209 TENSION-TYPE HEADACHE, NOT INTRACTABLE: ICD-10-CM

## 2024-12-13 DIAGNOSIS — G43.711 CHRONIC MIGRAINE WITHOUT AURA, WITH INTRACTABLE MIGRAINE, SO STATED, WITH STATUS MIGRAINOSUS: ICD-10-CM

## 2024-12-13 DIAGNOSIS — M62.838 NECK MUSCLE SPASM: ICD-10-CM

## 2024-12-13 PROCEDURE — 97110 THERAPEUTIC EXERCISES: CPT | Mod: GP,CQ

## 2024-12-13 PROCEDURE — 97140 MANUAL THERAPY 1/> REGIONS: CPT | Mod: GP,CQ

## 2024-12-13 RX ORDER — TRETINOIN 0.25 MG/G
CREAM TOPICAL
COMMUNITY
Start: 2024-11-25

## 2024-12-13 RX ORDER — CLOBETASOL PROPIONATE 0.5 MG/G
CREAM TOPICAL
COMMUNITY
Start: 2024-12-09

## 2024-12-13 RX ORDER — METHYLPREDNISOLONE 4 MG/1
TABLET ORAL
Qty: 147 TABLET | Refills: 0 | Status: SHIPPED | OUTPATIENT
Start: 2024-12-13

## 2024-12-13 RX ORDER — LIFITEGRAST 50 MG/ML
1 SOLUTION/ DROPS OPHTHALMIC 2 TIMES DAILY
COMMUNITY
Start: 2024-10-27

## 2024-12-13 NOTE — PROGRESS NOTES
Physical Therapy Treatment    Patient Name:Florence Albarran  MRN:[id number]  Today's Date:12/13/2024  Referred by: Shine Tan  Time Calculation  Start Time: 0737  Stop Time: 0826  Time Calculation (min): 49 min    Therapy Diagnosis  Problem List Items Addressed This Visit             ICD-10-CM    Neck muscle spasm M62.838     Other Visit Diagnoses         Codes    Tension-type headache, not intractable     G44.209            Assessment:   Pt tolerated treatment fairly well this date. Pt tolerating all supine manual techniques and therex without discomfort or TTP. During seated manual techniques pt with inc TTP at R LS, R UT regions. Pt receiving various techniques and stretches to reduce tension/tightness of cervical musculature. Pt educated on postural correction/adjustment techniques to perform throughout the day as well as tools to utilize outside of clinic to reduce symptoms.     Plan:   Continue manual techniques, progress R shoulder stability and strengthening    Insurance  Visit number: 2  Approved number of visits: 16 VISITS  Onset Date: 10/10/2024  Auth/Cert Start Date: 11/19/24  Auth/Cert End Date:  12/31/24      Precautions/Fall Risk: none  Pacemaker no  Seizures No  Post Op Movement/Restrictions No     Subjective/Pain: Pt with c/o R Levator scap and R upper trap currently 5-6/10 pain. Pt states pain increases up to 8/10 at work while on computer. Continues to experience tension headaches in evenings that wake her from sleep  Current Pain Level (0-10): 6      HEP Compliance: Good    Objective/Outcome Measures:  Fwd head, rounded shoulders at rest  thoracic kyphosis with decreased mobility into extension; R shoulder rests in slight IR and adduction     Treatment  Therapeutic Procedure PT Therapeutic Procedures Time Entry  Manual Therapy Time Entry: 29  Therapeutic Exercise Time Entry: 20 minutes      Therapeutic Exercise 20 minutes  Supine Red Band- B Hor Abd and B Sh ER 3 x 10 and 2x 10 reps  respectively  Supine 4# + SPC; B Scap punch x 30, B Sh flex 2 x 10, 2s hold  Supine Cervical Retraction with Towel 2  x30s holds  -education provided on continue HEP performance with addition of retro shoulder rolls throughout day, continue cervical retraction with towel and use of theracane/tennis balls to reduce symptoms-    Manual Therapy 29 minutes  Manual cervical traction/distraction with side bending, rotations and retractions.  Manual suboccipital release  Manual R UT, R LS, R SCM and R scalene stretches  TPR to R UT, R LS    STM/TPR to B cervical paraspinals, B upper trap, R levator scap, R SCM and R scalenes  Pin and stretch B cervical erector spinae, R UT, R LS and R scalene  MET to R UT and R LS    Neuromuscular Re-ed   minutes      Gait Training   minutes    Modalities   Vasopneumatic Device       minutes  Electrical Stimulation            minutes  Ultrasound                      minutes  Iontophoresis                     minutes  Cold Pack                        minutes  Mechanical Traction           minutes    OP EDUCATION:       Goals:  Cervical Spine Goals:  Cervical Spine Goals: By discharge, patient will:  1) Demonstrate independence with home exercise program  2) Tolerate increased exercise without adverse reaction for return to OF.   3) Improve spinal posture to demonstrate proper scapular position and posture for increased positional tolerances  4) Increase ROM of the cervical and thoracic spine to WFL in order to improve the ability to perform essential ADLs  5) Increase strength of  DNF, brigida-scapular and RTC musculature to 4/5 grossly in order to improve the ability to perform essential ADLs  6) Report decrease in pain by >= 2 points to meet MCID  7) Decrease score of NDI by >5 points to meet MCID  8) Be able to sleep through the night without disturbances and/or an increase in symptoms  9) Be able to sit at work desk for 60 minute intervals without flare ups in pain or headache  symptoms  10) Improve positional tolerances of sitting or standing for 60 minute intervals without headache symptoms or cervicalgia     Patient stated goal:  Pt wishes to improve posture, increase strength and mobility in neck/upper back, get through work shifts without pain or spasm

## 2024-12-13 NOTE — TELEPHONE ENCOUNTER
Very long intractable migraine. Feels she needs a long medrol taper that she hasn't done in quite a while .   Instr on use and side effect. States understating.   Waking in middle of each night with migraine.

## 2024-12-17 PROCEDURE — RXMED WILLOW AMBULATORY MEDICATION CHARGE

## 2024-12-19 ENCOUNTER — PHARMACY VISIT (OUTPATIENT)
Dept: PHARMACY | Facility: CLINIC | Age: 49
End: 2024-12-19
Payer: COMMERCIAL

## 2024-12-20 ENCOUNTER — TREATMENT (OUTPATIENT)
Dept: PHYSICAL THERAPY | Facility: HOSPITAL | Age: 49
End: 2024-12-20
Payer: COMMERCIAL

## 2024-12-20 DIAGNOSIS — M62.838 NECK MUSCLE SPASM: ICD-10-CM

## 2024-12-20 DIAGNOSIS — G44.209 TENSION-TYPE HEADACHE, NOT INTRACTABLE: ICD-10-CM

## 2024-12-20 PROCEDURE — 97140 MANUAL THERAPY 1/> REGIONS: CPT | Mod: GP,CQ

## 2024-12-20 PROCEDURE — 97110 THERAPEUTIC EXERCISES: CPT | Mod: GP,CQ

## 2024-12-20 NOTE — PROGRESS NOTES
Physical Therapy Treatment    Patient Name:Florence Albarran  MRN:[id number]  Today's Date:12/20/2024  Referred by: Shine Tan  Time Calculation  Start Time: 0733  Stop Time: 0820  Time Calculation (min): 47 min    Therapy Diagnosis  Problem List Items Addressed This Visit             ICD-10-CM    Neck muscle spasm M62.838     Other Visit Diagnoses         Codes    Tension-type headache, not intractable     G44.209            Assessment:   Pt tolerated treatment session well this date. Pt with improved capabilities performing therex this date achieving in ROM, minimal discomfort and inc reps and/or resistance. Pt receiving education on continued strengthening 2-3x per week and stretching/manual techniques PRN. Pt with reduced cervical and R shoulder tension/tightness post manual techniques.     Plan:   Continue manual techniques, progress R shoulder stability and strengthening     Insurance  Visit number: 3  Approved number of visits: 16 VISITS  Onset Date: 10/10/2024  Auth/Cert Start Date: 11/19/24  Auth/Cert End Date:  12/31/24      Precautions/Fall Risk: none  Pacemaker no  Seizures No  Post Op Movement/Restrictions No     Subjective/Pain: Pt reports feeling sore all week and yesterday pain reaching an 8/10 and needed to lay on a heating pad to help alleviate her symptoms.  Current Pain Level (0-10): 6    HEP Compliance: Good    Objective/Outcome Measures:  Fwd head, rounded shoulders at rest  thoracic kyphosis with decreased mobility into extension; R shoulder rests in slight IR and adduction     Treatment  Therapeutic Procedure PT Therapeutic Procedures Time Entry  Manual Therapy Time Entry: 27  Therapeutic Exercise Time Entry: 20 minutes      Therapeutic Exercise 20 minutes  4/4 UBE lvl 3  R Sh IR and ER isometrics 3 x 10s holds ea  Supine Red Band- B Hor Abd, B Sh ER and R D2 Flex/ext 3 x 10   Supine RUE 2.5# med ball scap punch + clock/counter x 10 ea  - HEP updated and reviewed -    Not today  Supine 4#  + SPC; B Scap punch x 30, B Sh flex 2 x 10, 2s hold  Supine Cervical Retraction with Towel 2  x30s holds    Manual Therapy 27 minutes  PROM R Shoulder into flex and IR/ER- slight crepitus at 160° flexion  Manual resistance for IR and ER isometrics    Manual cervical traction/distraction with side bending, rotations and retractions.  Manual suboccipital release  Manual R UT, R LS, R SCM and R scalene stretches  TPR to R UT, R LS     STM/TPR to B cervical paraspinals, B upper trap, R levator scap, R SCM and R scalenes  Pin and stretch B cervical erector spinae, R UT, R LS and R scalene  MET to R UT and R LS    Neuromuscular Re-ed   minutes      Gait Training   minutes    Modalities   Vasopneumatic Device       minutes  Electrical Stimulation            minutes  Ultrasound                      minutes  Iontophoresis                     minutes  Cold Pack                        minutes  Mechanical Traction           minutes    OP EDUCATION:   Access Code: [email or web address]  URL: [email or web address]  Date: 12/20/2024  Prepared by: Jorge Tucker    Exercises  - Supine Suboccipital Release with Tennis Balls  - 1 x daily - 7 x weekly - 1 sets - 2 reps - 3-5 min hold  - Seated Levator Scapulae Stretch  - 1 x daily - 7 x weekly - 1 sets - 2 reps - 30s hold  - Seated Upper Trapezius Stretch  - 1 x daily - 7 x weekly - 1 sets - 2 reps - 30s hold  - Seated Cervical Traction  - 1 x daily - 7 x weekly - 1 sets - 2 reps - 30s-1min hold  - Standing Shoulder Horizontal Abduction with Resistance  - 1 x daily - 7 x weekly - 3 sets - 10 reps  - Shoulder External Rotation and Scapular Retraction with Resistance  - 1 x daily - 7 x weekly - 3 sets - 10 reps  - Shoulder extension with resistance - Neutral  - 1 x daily - 7 x weekly - 3 sets - 10 reps  - Standing Shoulder Row with Anchored Resistance  - 1 x daily - 7 x weekly - 3 sets - 10 reps  - Standing Shoulder Flexion with Resistance  - 1 x daily - 7 x weekly - 3 sets -  10 reps  - Standing Shoulder Internal Rotation with Anchored Resistance  - 1 x daily - 7 x weekly - 3 sets - 10 reps  - Standing Serratus Punch with Resistance  - 1 x daily - 7 x weekly - 3 sets - 10 reps    Goals:  Cervical Spine Goals:  Cervical Spine Goals: By discharge, patient will:  1) Demonstrate independence with home exercise program  2) Tolerate increased exercise without adverse reaction for return to PLOF.   3) Improve spinal posture to demonstrate proper scapular position and posture for increased positional tolerances  4) Increase ROM of the cervical and thoracic spine to WFL in order to improve the ability to perform essential ADLs  5) Increase strength of  DNF, brigida-scapular and RTC musculature to 4/5 grossly in order to improve the ability to perform essential ADLs  6) Report decrease in pain by >= 2 points to meet MCID  7) Decrease score of NDI by >5 points to meet MCID  8) Be able to sleep through the night without disturbances and/or an increase in symptoms  9) Be able to sit at work desk for 60 minute intervals without flare ups in pain or headache symptoms  10) Improve positional tolerances of sitting or standing for 60 minute intervals without headache symptoms or cervicalgia     Patient stated goal:  Pt wishes to improve posture, increase strength and mobility in neck/upper back, get through work shifts without pain or spasm

## 2024-12-27 PROCEDURE — RXMED WILLOW AMBULATORY MEDICATION CHARGE

## 2024-12-28 ENCOUNTER — HOSPITAL ENCOUNTER (OUTPATIENT)
Dept: RADIOLOGY | Facility: CLINIC | Age: 49
Discharge: HOME | End: 2024-12-28
Payer: COMMERCIAL

## 2024-12-28 VITALS — HEIGHT: 68 IN | WEIGHT: 146 LBS | BODY MASS INDEX: 22.13 KG/M2

## 2024-12-28 DIAGNOSIS — Z12.31 ENCOUNTER FOR SCREENING MAMMOGRAM FOR MALIGNANT NEOPLASM OF BREAST: ICD-10-CM

## 2024-12-28 PROCEDURE — 77067 SCR MAMMO BI INCL CAD: CPT | Performed by: RADIOLOGY

## 2024-12-28 PROCEDURE — 77063 BREAST TOMOSYNTHESIS BI: CPT | Performed by: RADIOLOGY

## 2024-12-28 PROCEDURE — 77067 SCR MAMMO BI INCL CAD: CPT

## 2024-12-30 ENCOUNTER — PHARMACY VISIT (OUTPATIENT)
Dept: PHARMACY | Facility: CLINIC | Age: 49
End: 2024-12-30
Payer: COMMERCIAL

## 2024-12-30 ENCOUNTER — SPECIALTY PHARMACY (OUTPATIENT)
Dept: PHARMACY | Facility: CLINIC | Age: 49
End: 2024-12-30

## 2024-12-31 PROCEDURE — RXMED WILLOW AMBULATORY MEDICATION CHARGE

## 2025-01-03 ENCOUNTER — PATIENT MESSAGE (OUTPATIENT)
Dept: PRIMARY CARE | Facility: CLINIC | Age: 50
End: 2025-01-03

## 2025-01-03 ENCOUNTER — TREATMENT (OUTPATIENT)
Dept: PHYSICAL THERAPY | Facility: HOSPITAL | Age: 50
End: 2025-01-03
Payer: COMMERCIAL

## 2025-01-03 DIAGNOSIS — M62.838 NECK MUSCLE SPASM: ICD-10-CM

## 2025-01-03 DIAGNOSIS — G44.209 TENSION-TYPE HEADACHE, NOT INTRACTABLE: ICD-10-CM

## 2025-01-03 DIAGNOSIS — M62.838 NECK MUSCLE SPASM: Primary | ICD-10-CM

## 2025-01-03 PROCEDURE — 97140 MANUAL THERAPY 1/> REGIONS: CPT | Mod: GP,CQ

## 2025-01-03 PROCEDURE — 97110 THERAPEUTIC EXERCISES: CPT | Mod: GP,CQ

## 2025-01-03 PROCEDURE — RXMED WILLOW AMBULATORY MEDICATION CHARGE

## 2025-01-03 NOTE — PROGRESS NOTES
Physical Therapy Treatment    Patient Name:Florence Albarran  MRN:[id number]  Today's Date:1/3/2025  Referred by: Shine Tan  Time Calculation  Start Time: 0734  Stop Time: 0833  Time Calculation (min): 59 min    Therapy Diagnosis  Problem List Items Addressed This Visit             ICD-10-CM    Neck muscle spasm M62.838     Other Visit Diagnoses         Codes    Tension-type headache, not intractable     G44.209            Assessment:   Pt tolerated treatment well this date. Pt with no reports of discomfort during therex or manual techniques. Pt with improved cervical ROM noted during manual techniques and with therex. Pt reports compliance with HEP and continuing various modalities at home. Pt with reduced cervical tension post manual techniques.       Plan:   Continue manual techniques, progress R shoulder stability and strengthening, estim/modalities PRN  Prone Y, I, T on bench, prone [facility]    Insurance  Visit number: 4  Approved number of visits: 16 VISITS  Onset Date: 10/10/2024  Auth/Cert Start Date: 11/19/24  Auth/Cert End Date:  12/31/24      Precautions/Fall Risk: none  Pacemaker no  Seizures No  Post Op Movement/Restrictions No     Subjective/Pain: Pt continues c/o cervicogenic headaches waking her up at night. She will use a heating pack to soothe the pain and also an ice pack to reduce symptoms of headaches in evenings  Current Pain Level (0-10): 4    HEP Compliance: Good    Objective/Outcome Measures:  Fwd head, rounded shoulders at rest    thoracic kyphosis with decreased mobility into extension; R shoulder rests in slight IR and adduction     Treatment  Therapeutic Procedure PT Therapeutic Procedures Time Entry  Manual Therapy Time Entry: 29  Therapeutic Exercise Time Entry: 30 minutes      Therapeutic Exercise 30 minutes  3/3 UBE lvl 4  Supine Cervical retraction + alt sh flex x 10  Supine Red Band- B Hor Abd, B Sh ER - 3 x 10   Supine 3# B Sh ER + press 3 x 10  Jean pose <>  prone on elbows 2 x 30s holds  Cat/cow + scap retract/press x 2 mins  Thoracic Extension on foam roller 4 x 20s  Cervical retraction isometric 3 x 10s holds  Supine Cervical Retraction with Towel 2  x30s holds    Not today  Supine Red Band- R D2 Flex/ext 2 x 10  R Sh IR and ER isometrics 3 x 10s holds ea  Supine RUE 2.5# med ball scap punch + clock/counter x 10 ea  - HEP updated and reviewed -  Supine 4# + SPC; B Scap punch x 30, B Sh flex 2 x 10, 2s hold    Manual Therapy 29 minutes   Manual cervical traction/distraction with side bending, rotations and retractions.  Manual suboccipital release  Manual R UT, R LS, R SCM and R scalene stretches  TPR to R UT, R LS     STM/TPR to B cervical paraspinals, B upper trap, R levator scap, R SCM and R scalenes  Pin and stretch B cervical erector spinae, R UT, R LS and R scalene  MET to R UT and R LS      Not today  PROM R Shoulder into flex and IR/ER- slight crepitus at 160° flexion  Manual resistance for IR and ER isometrics    Neuromuscular Re-ed   minutes      Gait Training   minutes    Modalities   Vasopneumatic Device       minutes  Electrical Stimulation            minutes  Ultrasound                      minutes  Iontophoresis                     minutes  Cold Pack                        minutes  Mechanical Traction           minutes    OP EDUCATION:       Goals:  Cervical Spine Goals:  Cervical Spine Goals: By discharge, patient will:  1) Demonstrate independence with home exercise program  2) Tolerate increased exercise without adverse reaction for return to PLOF.   3) Improve spinal posture to demonstrate proper scapular position and posture for increased positional tolerances  4) Increase ROM of the cervical and thoracic spine to WFL in order to improve the ability to perform essential ADLs  5) Increase strength of  DNF, brigida-scapular and RTC musculature to 4/5 grossly in order to improve the ability to perform essential ADLs  6) Report decrease in pain by >= 2  points to meet MCID  7) Decrease score of NDI by >5 points to meet MCID  8) Be able to sleep through the night without disturbances and/or an increase in symptoms  9) Be able to sit at work desk for 60 minute intervals without flare ups in pain or headache symptoms  10) Improve positional tolerances of sitting or standing for 60 minute intervals without headache symptoms or cervicalgia     Patient stated goal:  Pt wishes to improve posture, increase strength and mobility in neck/upper back, get through work shifts without pain or spasm

## 2025-01-06 PROCEDURE — RXMED WILLOW AMBULATORY MEDICATION CHARGE

## 2025-01-07 ENCOUNTER — SPECIALTY PHARMACY (OUTPATIENT)
Dept: PHARMACY | Facility: CLINIC | Age: 50
End: 2025-01-07

## 2025-01-08 ENCOUNTER — APPOINTMENT (OUTPATIENT)
Dept: PHYSICAL THERAPY | Facility: HOSPITAL | Age: 50
End: 2025-01-08
Payer: COMMERCIAL

## 2025-01-09 ENCOUNTER — PHARMACY VISIT (OUTPATIENT)
Dept: PHARMACY | Facility: CLINIC | Age: 50
End: 2025-01-09
Payer: COMMERCIAL

## 2025-01-10 ENCOUNTER — PHARMACY VISIT (OUTPATIENT)
Dept: PHARMACY | Facility: CLINIC | Age: 50
End: 2025-01-10
Payer: COMMERCIAL

## 2025-01-13 PROCEDURE — RXMED WILLOW AMBULATORY MEDICATION CHARGE

## 2025-01-14 ENCOUNTER — SPECIALTY PHARMACY (OUTPATIENT)
Dept: PHARMACY | Facility: CLINIC | Age: 50
End: 2025-01-14

## 2025-01-14 ENCOUNTER — TREATMENT (OUTPATIENT)
Dept: PHYSICAL THERAPY | Facility: HOSPITAL | Age: 50
End: 2025-01-14
Payer: COMMERCIAL

## 2025-01-14 DIAGNOSIS — G44.209 TENSION-TYPE HEADACHE, NOT INTRACTABLE: ICD-10-CM

## 2025-01-14 DIAGNOSIS — M62.838 NECK MUSCLE SPASM: ICD-10-CM

## 2025-01-14 PROCEDURE — 97032 APPL MODALITY 1+ESTIM EA 15: CPT | Mod: GP | Performed by: PHYSICAL THERAPIST

## 2025-01-14 PROCEDURE — 97110 THERAPEUTIC EXERCISES: CPT | Mod: GP | Performed by: PHYSICAL THERAPIST

## 2025-01-14 PROCEDURE — 97140 MANUAL THERAPY 1/> REGIONS: CPT | Mod: GP | Performed by: PHYSICAL THERAPIST

## 2025-01-14 NOTE — PROGRESS NOTES
Physical Therapy Treatment    Patient Name:Florence Albarran  MRN:[id number]  Today's Date:1/14/2025  Referred by: Shine Tan  Time Calculation  Start Time: 0735  Stop Time: 0820  Time Calculation (min): 45 min    Therapy Diagnosis  Problem List Items Addressed This Visit             ICD-10-CM    Neck muscle spasm M62.838     Other Visit Diagnoses         Codes    Tension-type headache, not intractable     G44.209            Assessment: Initiated IDN to R UT with attended e-stim today with goal to release muscular trigger points in the area of complain followed by manual therapy techniques IASTM/STM to the R UT today and postural education/upper back mobility. Myofascial trigger points revealed in the R UT muscle(s) by dry needle technique with noted needle fibrillation, local twitch response, reproduction of symptoms including but not limited to aching, burning and electricity. Noted are atypical tissue morphology characteristics of abnormal density, palpable margins, contracted/fibrotic muscle and fascial tissue with resistance to penetration. These characteristics reflect abnormal tissue function, innervation and nervous system communication.   Remainder of session focusing on posture and spinal mobility.     Plan: Continue IDN+ e-stim and manual techniques, progress thoracic spinal mobility and postural strengthening exercises      Insurance  Visit number: 5  Approved number of visits: 16 VISITS  Onset Date: 10/10/2024  Auth/Cert Start Date: 1/3/2025  Auth/Cert End Date:  ??    Precautions/Fall Risk: none  Pacemaker no  Seizures No  Post Op Movement/Restrictions No     Subjective/Pain: Pt reports it's been a few weeks since I've been in to therapy so it's feeling a little tighter than usual. It's always the right side of my neck at the bend. She reports she wakes up nightly with a headache from this complaint.  Current Pain Level (0-10): 4    HEP Compliance: Good    Objective/Outcome Measures:  TrP to R UT  "muscle belly  Fwd head, rounded shoulders at rest  Thoracic kyphosis with decreased mobility into extension; R shoulder rests in slight IR and adduction     Treatment  Therapeutic Procedure PT Therapeutic Procedures Time Entry  Manual Therapy Time Entry: 10  Therapeutic Exercise Time Entry: 20 minutes    Therapeutic Exercise 20 minutes  6 min Backwards UBE lvl 3 focusing on postural engagement   Side lying Trunk Rotation: 10\" x 10   Prone UE superman TO \"W\": 3\" X 10   Supine Cervical Retraction with Towel at head 3\" x15    Manual Therapy 10 minutes   Manual R UT, R LS, R SCM and R scalene stretches  TPR to R UT, R LS  STM/TPR to B cervical paraspinals, B upper trap, R levator scap, R SCM and R scalenes    Neuromuscular Re-ed   minutes      Gait Training   minutes    Modalities -Attended [id number] 1 unit    Electrical Stimulation          15 minutes Trigger point needling was performed to the following muscles R UT with E-stim on continuous 5Hz, 150 us for 8 minutes. All trigger/tender points were marked and noted. Patient was prepped with 70% Isopropyl alcohol to the site(s). Sterile, single use, solid filament needles were inserted at various depths and angles to release tight tissue, improve microcirculation and remove neuro-noxious chemicals via a myofascial twitch response.   OP EDUCATION:       Goals:  Cervical Spine Goals:  Cervical Spine Goals: By discharge, patient will:  1) Demonstrate independence with home exercise program  2) Tolerate increased exercise without adverse reaction for return to PLOF.   3) Improve spinal posture to demonstrate proper scapular position and posture for increased positional tolerances  4) Increase ROM of the cervical and thoracic spine to WFL in order to improve the ability to perform essential ADLs  5) Increase strength of  DNF, brigida-scapular and RTC musculature to 4/5 grossly in order to improve the ability to perform essential ADLs  6) Report decrease in pain by >= 2 points to " meet MCID  7) Decrease score of NDI by >5 points to meet MCID  8) Be able to sleep through the night without disturbances and/or an increase in symptoms  9) Be able to sit at work desk for 60 minute intervals without flare ups in pain or headache symptoms  10) Improve positional tolerances of sitting or standing for 60 minute intervals without headache symptoms or cervicalgia     Patient stated goal:  Pt wishes to improve posture, increase strength and mobility in neck/upper back, get through work shifts without pain or spasm

## 2025-01-16 ENCOUNTER — PHARMACY VISIT (OUTPATIENT)
Dept: PHARMACY | Facility: CLINIC | Age: 50
End: 2025-01-16
Payer: COMMERCIAL

## 2025-01-16 ENCOUNTER — PATIENT MESSAGE (OUTPATIENT)
Dept: PRIMARY CARE | Facility: CLINIC | Age: 50
End: 2025-01-16
Payer: COMMERCIAL

## 2025-01-16 DIAGNOSIS — J06.9 UPPER RESPIRATORY TRACT INFECTION, UNSPECIFIED TYPE: Primary | ICD-10-CM

## 2025-01-16 RX ORDER — AMOXICILLIN AND CLAVULANATE POTASSIUM 875; 125 MG/1; MG/1
875 TABLET, FILM COATED ORAL 2 TIMES DAILY
Qty: 14 TABLET | Refills: 0 | Status: SHIPPED | OUTPATIENT
Start: 2025-01-16 | End: 2025-01-23

## 2025-01-21 ENCOUNTER — TREATMENT (OUTPATIENT)
Dept: PHYSICAL THERAPY | Facility: HOSPITAL | Age: 50
End: 2025-01-21
Payer: COMMERCIAL

## 2025-01-21 DIAGNOSIS — M62.838 NECK MUSCLE SPASM: ICD-10-CM

## 2025-01-21 DIAGNOSIS — G44.209 TENSION-TYPE HEADACHE, NOT INTRACTABLE: ICD-10-CM

## 2025-01-21 PROCEDURE — 97140 MANUAL THERAPY 1/> REGIONS: CPT | Mod: GP | Performed by: PHYSICAL THERAPIST

## 2025-01-21 PROCEDURE — 97032 APPL MODALITY 1+ESTIM EA 15: CPT | Mod: GP | Performed by: PHYSICAL THERAPIST

## 2025-01-21 PROCEDURE — 97110 THERAPEUTIC EXERCISES: CPT | Mod: GP | Performed by: PHYSICAL THERAPIST

## 2025-01-21 NOTE — PROGRESS NOTES
Physical Therapy Treatment    Patient Name:Florence Albarran  MRN:[id number]  Today's Date:1/21/2025  Referred by: Shine Tan  Time Calculation  Start Time: 0730  Stop Time: 0823  Time Calculation (min): 53 min    Therapy Diagnosis  Problem List Items Addressed This Visit             ICD-10-CM    Neck muscle spasm M62.838     Other Visit Diagnoses         Codes    Tension-type headache, not intractable     G44.209            Assessment: Continued with IDN to R UT with attended e-stim to her R UT today with goal to release muscular trigger points in the area of complain followed by manual therapy techniques IASTM/STM to the R UT today and postural education/upper back mobility. Myofascial trigger points revealed in the R UT muscle(s) by dry needle technique with noted needle fibrillation, local twitch response, reproduction of symptoms including but not limited to aching, burning and electricity. Noted are atypical tissue morphology characteristics of abnormal density, palpable margins, contracted/fibrotic muscle and fascial tissue with resistance to penetration. These characteristics reflect abnormal tissue function, innervation and nervous system communication.   Remainder of session focusing on posture and spinal mobility, specifically the thoracic spine.     Plan: Continue IDN+ e-stim and manual techniques, progress thoracic spinal mobility and postural strengthening exercises      Insurance  Visit number: 6  Approved number of visits: 16 VISITS  Onset Date: 10/10/2024  Auth/Cert Start Date: 1/3/2025  Auth/Cert End Date:  ?? Auth submitted 1/10/2025    Precautions/Fall Risk: none  Pacemaker no  Seizures No  Post Op Movement/Restrictions No     Subjective/Pain: Pt reports she felt pretty good for a few days after last session treatment. She had a rough day Friday at work but was better over the weekend because she wasn't sitting at a desk all day. She consciously took breaks to get up from her desk yesterday  "and it wasn't quite as bad. \"Not too bad this morning\"   Current Pain Level (0-10): 2    HEP Compliance: Good    Objective/Outcome Measures:  TrP to R UT muscle belly  Fwd head, rounded shoulders at rest  Thoracic kyphosis with decreased mobility into extension; R shoulder rests in slight IR and adduction     Treatment  Therapeutic Procedure PT Therapeutic Procedures Time Entry  Manual Therapy Time Entry: 10  Therapeutic Exercise Time Entry: 30 minutes    Therapeutic Exercise 30 minutes  5 min Backwards UBE lvl 3 focusing on postural engagement   Doorway Pec Stretch: 30\" x 3   Supine UT thera-ball mobilization pin and stretch: x 20   Kneeling Thoracic Trunk Extension  Standing Chest Pulls (green TB) against foam roll 2 x 10   Resisted shrugs: 3 x 10 5# ea hand DB  Quad Trunk Rotation + Thread the needle: 5\" x 10       Manual Therapy 10 minutes   Manual R UT, R LS, R SCM and R scalene stretches  TPR to R UT, R LS  STM/TPR to R cervical paraspinals, B upper trap, R levator scap, R SCM and R scalenes    Neuromuscular Re-ed   minutes      Gait Training   minutes    Modalities -Attended [id number] 1 unit    Electrical Stimulation          13 minutes Trigger point needling was performed to the following muscles R UT with E-stim on continuous 5Hz, 150 us for 8 minutes. All trigger/tender points were marked and noted. Patient was prepped with 70% Isopropyl alcohol to the site(s). Sterile, single use, solid filament needles were inserted at various depths and angles to release tight tissue, improve microcirculation and remove neuro-noxious chemicals via a myofascial twitch response.     OP EDUCATION:       Goals:  Cervical Spine Goals:  Cervical Spine Goals: By discharge, patient will:  1) Demonstrate independence with home exercise program  2) Tolerate increased exercise without adverse reaction for return to PLOF.   3) Improve spinal posture to demonstrate proper scapular position and posture for increased positional " tolerances  4) Increase ROM of the cervical and thoracic spine to WFL in order to improve the ability to perform essential ADLs  5) Increase strength of  DNF, brigida-scapular and RTC musculature to 4/5 grossly in order to improve the ability to perform essential ADLs  6) Report decrease in pain by >= 2 points to meet MCID  7) Decrease score of NDI by >5 points to meet MCID  8) Be able to sleep through the night without disturbances and/or an increase in symptoms  9) Be able to sit at work desk for 60 minute intervals without flare ups in pain or headache symptoms  10) Improve positional tolerances of sitting or standing for 60 minute intervals without headache symptoms or cervicalgia     Patient stated goal:  Pt wishes to improve posture, increase strength and mobility in neck/upper back, get through work shifts without pain or spasm

## 2025-01-23 PROCEDURE — RXMED WILLOW AMBULATORY MEDICATION CHARGE

## 2025-01-24 ENCOUNTER — OFFICE VISIT (OUTPATIENT)
Dept: NEUROLOGY | Facility: HOSPITAL | Age: 50
End: 2025-01-24
Payer: COMMERCIAL

## 2025-01-24 VITALS
WEIGHT: 146 LBS | HEIGHT: 68 IN | SYSTOLIC BLOOD PRESSURE: 124 MMHG | TEMPERATURE: 97.1 F | HEART RATE: 94 BPM | RESPIRATION RATE: 18 BRPM | DIASTOLIC BLOOD PRESSURE: 69 MMHG | BODY MASS INDEX: 22.13 KG/M2

## 2025-01-24 DIAGNOSIS — G43.009 MIGRAINE WITHOUT AURA AND WITHOUT STATUS MIGRAINOSUS, NOT INTRACTABLE: ICD-10-CM

## 2025-01-24 DIAGNOSIS — G43.711 CHRONIC MIGRAINE WITHOUT AURA, WITH INTRACTABLE MIGRAINE, SO STATED, WITH STATUS MIGRAINOSUS: Primary | ICD-10-CM

## 2025-01-24 DIAGNOSIS — M54.12 CERVICAL RADICULOPATHY: ICD-10-CM

## 2025-01-24 PROBLEM — G43.909 MIGRAINE HEADACHE: Status: RESOLVED | Noted: 2023-08-30 | Resolved: 2025-01-24

## 2025-01-24 PROCEDURE — 3008F BODY MASS INDEX DOCD: CPT | Performed by: PSYCHIATRY & NEUROLOGY

## 2025-01-24 PROCEDURE — 99215 OFFICE O/P EST HI 40 MIN: CPT | Performed by: PSYCHIATRY & NEUROLOGY

## 2025-01-24 PROCEDURE — 1036F TOBACCO NON-USER: CPT | Performed by: PSYCHIATRY & NEUROLOGY

## 2025-01-24 RX ORDER — LIDOCAINE 50 MG/G
1 PATCH TOPICAL DAILY
Qty: 30 PATCH | Refills: 11 | Status: SHIPPED | OUTPATIENT
Start: 2025-01-24 | End: 2026-01-24

## 2025-01-24 RX ORDER — RIZATRIPTAN BENZOATE 10 MG/1
TABLET, ORALLY DISINTEGRATING ORAL
Qty: 12 TABLET | Refills: 11 | Status: SHIPPED | OUTPATIENT
Start: 2025-01-24

## 2025-01-24 RX ORDER — TIZANIDINE HYDROCHLORIDE 4 MG/1
4 CAPSULE, GELATIN COATED ORAL AS NEEDED
COMMUNITY

## 2025-01-24 ASSESSMENT — PAIN SCALES - GENERAL: PAINLEVEL_OUTOF10: 0-NO PAIN

## 2025-01-24 NOTE — ASSESSMENT & PLAN NOTE
You have failed conservative therapy for your neck lets get an mri of your neck and see if PM can help with an injection

## 2025-01-24 NOTE — PROGRESS NOTES
Botox every 90 days,  Last 11-    Experiencing 2 migraines per week. Headache occipital every evening, WAKES EVERY NIGHT. . Thinks related to neck.   Treating migraine Excedrin and Rizatriptan and relieves well in 2 hours. If lingers then Nurtec.   Has Ubrelvy, sometimes works and sometimes does not. Trudhesa if very persistent. Rare use.   Long medrol dose Dimitri taper works to break if in a daily migraine cycle.     Every evening headaches describes as throbbing but in different area than she has ever experienced migraine. Treating with Ibuprofen, muscle relaxers tizanidine or Robaxin. Sometimes Excedrin.   Feels triggered by neck pain. Not new but worsened. Neck to shoulder on right side just at curve. Has been going to PT. PT is sometimes helpful, sometimes feels like it makes it worse. Has tried all of the muscle relaxers.    Would like any other intervention for the neck pain. Increased gabapentin. Pain management  Has had Trigger point injection in that area in remote past. Thinks she remember them being helpful.    Migraine occurs temple, one side or the other. Sometimes switches sides long term thru migraine. Describes as throbbing.   Associated light and noise sensitivity and nausea.   Triggers are weather change, post stress letdown, poor sleep.     Falls asleep easily but doesn't stay asleep. Averaging 6 hours of sleep but interrupted. Will sleep for 2 hours and up for at least an hour before falls back to sleep.     Denies anxiety and depression.     Continues Ajovy for prevention monthly which has decreased migraine an additional 50% from Botox effects.     Denied numbness or weakness in the arms    Patient had 15 headache days a month or more, 8 meeting migraine criteria. They had tried and failed 3 preventative and 3 abortives. Presently their headaches are well controlled because of Botox Therapy. It has reduced the headaches by 50%.       C xray 2015    Narrative & Impression    EXAMINATION:  Cervical spine (XR, 6 view)     CLINICAL HISTORY:  CERVICAL DISC DEGENERATION     FINDINGS:  5 views of the cervical spine including AP, lateral, lateral flexion   and extension and bilateral oblique projections were obtained      .    There are no fractures identified.  The vertebral bodies are well   aligned without evidence of subluxation.     There is no prevertebral   soft tissue swelling identified.  The neuroforamina are grossly   patent.  Disc heights are well maintained.  No abnormal motion is   seen on the flexion and extension views.     IMPRESSION:  No evidence of fracture or subluxation.        This study was interpreted and dictated at [facility] in [place], [place]     Trigger Point Injection     Pre-operative diagnosis: myofascial pain    Post-operative diagnosis: myofascial pain    After risks and benefits were explained including bleeding, infection, worsening of the pain, damage to the area being injected, weakness, allergic reaction to medications, vascular injection, and nerve damage, signed consent was obtained.  All questions were answered.      The area of the trigger point was identified and the skin prepped  with alcohol and the alcohol allowed to dry.  Next, a 25 gauge  was placed in the area of the trigger point.  Once reproduction of the pain was elicited and negative aspiration confirmed, the trigger point was injected and the needle removed.      The patient did tolerate the procedure well and there were not complications.      Medication used: 12mg of Celestone , 25 mg of 0.5% Bupivicaine, 50 mg of 1% Lidocaine     Trigger points injected: 1      Trigger point(s) location(s):  right   Trapezius       To review what we discussed today   Assessment/Plan   Problem List Items Addressed This Visit       Chronic migraine without aura, with intractable migraine, so stated, with status migrainosus - Primary     Continue current plan          RESOLVED: Migraine headache    Relevant Medications    rizatriptan MLT (Maxalt-MLT) 10 mg disintegrating tablet    Cervical radiculopathy     You have failed conservative therapy for your neck lets get an mri of your neck and see if PM can help with an injection         Relevant Medications    lidocaine (Lidoderm) 5 % patch    Other Relevant Orders    MR cervical spine wo IV contrast       Your next appointment with me is 2/21/2025.    Thank you for visiting the office of Dr Scarlet Munoz. It was a pleasure working with you today.   Kimberlyn Ferguson [address] Mon-Thursday  [facility] Fridays  Our office phone number is [phone]. You can use this number to leave messages for Sussy or to schedule or reschedule an appointment or request refills.  If you were seen on Thursday afternoon or Friday our office will call on Monday or Tuesday to reschedule your appointment. If you do not receive a call please call us.   We are also available for messages on my chart. We make every effort to respond to your concerns by the end of the next business day.

## 2025-01-28 ENCOUNTER — PHARMACY VISIT (OUTPATIENT)
Dept: PHARMACY | Facility: CLINIC | Age: 50
End: 2025-01-28
Payer: COMMERCIAL

## 2025-01-28 ENCOUNTER — TREATMENT (OUTPATIENT)
Dept: PHYSICAL THERAPY | Facility: HOSPITAL | Age: 50
End: 2025-01-28
Payer: COMMERCIAL

## 2025-01-28 DIAGNOSIS — G44.209 TENSION-TYPE HEADACHE, NOT INTRACTABLE: ICD-10-CM

## 2025-01-28 DIAGNOSIS — M62.838 NECK MUSCLE SPASM: ICD-10-CM

## 2025-01-28 PROCEDURE — 97014 ELECTRIC STIMULATION THERAPY: CPT | Mod: GP | Performed by: PHYSICAL THERAPIST

## 2025-01-28 PROCEDURE — 97032 APPL MODALITY 1+ESTIM EA 15: CPT | Mod: GP | Performed by: PHYSICAL THERAPIST

## 2025-01-28 PROCEDURE — RXMED WILLOW AMBULATORY MEDICATION CHARGE

## 2025-01-28 PROCEDURE — 97140 MANUAL THERAPY 1/> REGIONS: CPT | Mod: GP | Performed by: PHYSICAL THERAPIST

## 2025-01-28 NOTE — PROGRESS NOTES
Physical Therapy Treatment    Patient Name:Florence Albarran  MRN:[id number]  Today's Date:1/28/2025  Referred by: Shine Tan  Time Calculation  Start Time: 0728  Stop Time: 0815  Time Calculation (min): 47 min    Therapy Diagnosis  Problem List Items Addressed This Visit             ICD-10-CM    Neck muscle spasm M62.838     Other Visit Diagnoses         Codes    Tension-type headache, not intractable     G44.209            Assessment: Continued with IDN to R UT with attended e-stim to her R UT today with goal to release muscular trigger points in the area of complain followed by manual therapy techniques TrP release and STM to the R UT today and postural education/upper back mobility. Myofascial trigger points revealed in the R UT muscle(s) by dry needle technique with noted needle fibrillation, local twitch response, reproduction of symptoms including but not limited to aching, burning and electricity. Noted are atypical tissue morphology characteristics of abnormal density, palpable margins, contracted/fibrotic muscle and fascial tissue with resistance to penetration. These characteristics reflect abnormal tissue function, innervation and nervous system communication. Utilized MHP and TENS to R UT today for symptom management and held active exercises today due to increased complaints      Plan: Continue IDN+ e-stim and manual techniques, progress thoracic spinal mobility and postural strengthening exercises  Sched 3 more visits and has MRI sched in 2 weeks    Insurance  Visit number: 7 (visit 3 of 6 new auth)  Approved number of visits: 6 VISITS  Onset Date: 10/10/2024  Auth/Cert End Date: 1/3/2025 - 4/3/25     Precautions/Fall Risk: none  Pacemaker no  Seizures No  Post Op Movement/Restrictions No     Subjective/Pain: Pt reports she has been feeling pretty terrible since Friday following seeing the Neurologist for trigger point injections and ever since then she has been having pain. She reports pain to b  "6/10 and is getting no relief with performing her stretches and exercises.  Current Pain Level (0-10): 6    HEP Compliance: Good    Objective/Outcome Measures:  TrP to R UT muscle belly  Fwd head, rounded shoulders at rest  Thoracic kyphosis with decreased mobility into extension; R shoulder rests in slight IR and adduction     Treatment  Therapeutic Procedure PT Therapeutic Procedures Time Entry  Manual Therapy Time Entry: 23 minutes    Therapeutic Exercise   minutes - Not   5 min Backwards UBE lvl 3 focusing on postural engagement   Doorway Pec Stretch: 30\" x 3   Supine UT thera-ball mobilization pin and stretch: x 20   Kneeling Thoracic Trunk Extension  Standing Chest Pulls (green TB) against foam roll 2 x 10   Resisted shrugs: 3 x 10 5# ea hand DB  Quad Trunk Rotation + Thread the needle: 5\" x 10       Manual Therapy 23 minutes   Manual R UT, R LS, R SCM and R scalene stretches  TPR to R UT, R LS  STM/TPR to R cervical paraspinals, B upper trap, R levator scap, R SCM and R scalenes    Neuromuscular Re-ed   minutes      Gait Training   minutes    Modalities -Attended [id number] 1 unit    Electrical Stimulation        10 14 minutes Trigger point needling was performed to the following muscles R UT with E-stim on continuous 5Hz, 150 us for 8 minutes. All trigger/tender points were marked and noted. Patient was prepped with 70% Isopropyl alcohol to the site(s). Sterile, single use, solid filament needles were inserted at various depths and angles to release tight tissue, improve microcirculation and remove neuro-noxious chemicals via a myofascial twitch response.     OP EDUCATION:       Goals:  Cervical Spine Goals:  Cervical Spine Goals: By discharge, patient will:  1) Demonstrate independence with home exercise program  2) Tolerate increased exercise without adverse reaction for return to PLOF.   3) Improve spinal posture to demonstrate proper scapular position and posture for increased positional tolerances  4) " Increase ROM of the cervical and thoracic spine to WFL in order to improve the ability to perform essential ADLs  5) Increase strength of  DNF, brigida-scapular and RTC musculature to 4/5 grossly in order to improve the ability to perform essential ADLs  6) Report decrease in pain by >= 2 points to meet MCID  7) Decrease score of NDI by >5 points to meet MCID  8) Be able to sleep through the night without disturbances and/or an increase in symptoms  9) Be able to sit at work desk for 60 minute intervals without flare ups in pain or headache symptoms  10) Improve positional tolerances of sitting or standing for 60 minute intervals without headache symptoms or cervicalgia     Patient stated goal:  Pt wishes to improve posture, increase strength and mobility in neck/upper back, get through work shifts without pain or spasm

## 2025-01-29 ENCOUNTER — PHARMACY VISIT (OUTPATIENT)
Dept: PHARMACY | Facility: CLINIC | Age: 50
End: 2025-01-29
Payer: COMMERCIAL

## 2025-01-30 ENCOUNTER — SPECIALTY PHARMACY (OUTPATIENT)
Dept: PHARMACY | Facility: CLINIC | Age: 50
End: 2025-01-30

## 2025-01-31 ENCOUNTER — TELEPHONE (OUTPATIENT)
Dept: NEUROLOGY | Facility: HOSPITAL | Age: 50
End: 2025-01-31
Payer: COMMERCIAL

## 2025-02-01 ENCOUNTER — HOSPITAL ENCOUNTER (OUTPATIENT)
Dept: RADIOLOGY | Facility: CLINIC | Age: 50
Discharge: HOME | End: 2025-02-01
Payer: COMMERCIAL

## 2025-02-01 DIAGNOSIS — M54.12 CERVICAL RADICULOPATHY: ICD-10-CM

## 2025-02-01 PROCEDURE — 72141 MRI NECK SPINE W/O DYE: CPT | Performed by: STUDENT IN AN ORGANIZED HEALTH CARE EDUCATION/TRAINING PROGRAM

## 2025-02-01 PROCEDURE — 72141 MRI NECK SPINE W/O DYE: CPT

## 2025-02-03 DIAGNOSIS — R11.2 NAUSEA AND VOMITING, UNSPECIFIED VOMITING TYPE: ICD-10-CM

## 2025-02-03 DIAGNOSIS — E04.2 MULTIPLE THYROID NODULES: Primary | ICD-10-CM

## 2025-02-03 PROCEDURE — RXMED WILLOW AMBULATORY MEDICATION CHARGE

## 2025-02-03 RX ORDER — ONDANSETRON HYDROCHLORIDE 8 MG/1
TABLET, FILM COATED ORAL
Qty: 30 TABLET | Refills: 6 | Status: SHIPPED | OUTPATIENT
Start: 2025-02-03 | End: 2026-02-03

## 2025-02-04 ENCOUNTER — TREATMENT (OUTPATIENT)
Dept: PHYSICAL THERAPY | Facility: HOSPITAL | Age: 50
End: 2025-02-04
Payer: COMMERCIAL

## 2025-02-04 DIAGNOSIS — G44.209 TENSION-TYPE HEADACHE, NOT INTRACTABLE: ICD-10-CM

## 2025-02-04 DIAGNOSIS — M62.838 NECK MUSCLE SPASM: ICD-10-CM

## 2025-02-04 PROCEDURE — 97032 APPL MODALITY 1+ESTIM EA 15: CPT | Mod: GP | Performed by: PHYSICAL THERAPIST

## 2025-02-04 PROCEDURE — 97140 MANUAL THERAPY 1/> REGIONS: CPT | Mod: GP | Performed by: PHYSICAL THERAPIST

## 2025-02-04 PROCEDURE — 97110 THERAPEUTIC EXERCISES: CPT | Mod: GP | Performed by: PHYSICAL THERAPIST

## 2025-02-04 NOTE — PROGRESS NOTES
Physical Therapy Treatment    Patient Name:Florence Albarran  MRN:[id number]  Today's Date:2/4/2025  Referred by: Shine Tan  Time Calculation  Start Time: 0732  Stop Time: 0822  Time Calculation (min): 50 min    Therapy Diagnosis  Problem List Items Addressed This Visit             ICD-10-CM    Neck muscle spasm M62.838     Other Visit Diagnoses         Codes    Tension-type headache, not intractable     G44.209            Assessment: Overall, UT trigger points seem to be lessening through palpation but symptoms of HA and tension remain. Continued with IDN to R UT with attended e-stim to her R UT today with goal to release muscular trigger points in the area of complain followed by manual therapy techniques TrP release and STM to the R UT today and postural education/upper back mobility. Myofascial trigger points revealed in the R UT muscle(s) by dry needle technique with noted needle fibrillation, local twitch response, reproduction of symptoms including but not limited to aching, burning and electricity. Noted are atypical tissue morphology characteristics of abnormal density, palpable margins, contracted/fibrotic muscle and fascial tissue with resistance to penetration. These characteristics reflect abnormal tissue function, innervation and nervous system communication. STM performed following to R UT trigger points. T-spine mobility following with goal to promote upright posture and reduce FHP.    Plan: Continue IDN+ e-stim and manual techniques, progress thoracic spinal mobility and postural strengthening exercises      Insurance  Visit number: 8 (visit 4 of 6 new auth)  Approved number of visits: 6 VISITS  Onset Date: 10/10/2024  Auth/Cert End Date: 1/3/2025 - 4/3/25     Precautions/Fall Risk: none  Pacemaker no  Seizures No  Post Op Movement/Restrictions No     Subjective/Pain: Pt reports she has continued to experience headaches daily but her TENS unit came and she has been keeping up with exercises so  "things have calmed down from last session.  Current Pain Level (0-10): 6    HEP Compliance: Good    Objective/Outcome Measures:  TrP to R UT muscle belly  Fwd head, rounded shoulders at rest  Thoracic kyphosis with decreased mobility into extension; R shoulder rests in slight IR and adduction     Treatment  Therapeutic Procedure PT Therapeutic Procedures Time Entry  Manual Therapy Time Entry: 25  Therapeutic Exercise Time Entry: 10 minutes    Therapeutic Exercise 10 minutes -   5 min Backwards UBE lvl 3 focusing on postural engagement   Cybex Rows: 20 # 2 x 10   Supine Foam Roll Pec Stretch: 30\" x 3   Supine UT thera-ball mobilization pin and stretch: 10\" x 20   Side Lying Trunk Rotation: 10\" x 10    Manual Therapy 25 minutes   Manual R UT, R LS, R SCM and R scalene stretches  TPR to R UT, R LS  STM/TPR to R cervical paraspinals, B upper trap, R levator scap, R SCM and R scalenes    Neuromuscular Re-ed   minutes      Gait Training   minutes    Modalities -Attended [id number] 1 unit    Electrical Stimulation          15 minutes Trigger point needling was performed to the following muscles R UT with E-stim on continuous 5Hz, 150 us for 8 minutes. All trigger/tender points were marked and noted. Patient was prepped with 70% Isopropyl alcohol to the site(s). Sterile, single use, solid filament needles were inserted at various depths and angles to release tight tissue, improve microcirculation and remove neuro-noxious chemicals via a myofascial twitch response.     OP EDUCATION:       Goals:  Cervical Spine Goals:  Cervical Spine Goals: By discharge, patient will:  1) Demonstrate independence with home exercise program  2) Tolerate increased exercise without adverse reaction for return to PLOF.   3) Improve spinal posture to demonstrate proper scapular position and posture for increased positional tolerances  4) Increase ROM of the cervical and thoracic spine to WFL in order to improve the ability to perform essential " ADLs  5) Increase strength of  DNF, brigida-scapular and RTC musculature to 4/5 grossly in order to improve the ability to perform essential ADLs  6) Report decrease in pain by >= 2 points to meet MCID  7) Decrease score of NDI by >5 points to meet MCID  8) Be able to sleep through the night without disturbances and/or an increase in symptoms  9) Be able to sit at work desk for 60 minute intervals without flare ups in pain or headache symptoms  10) Improve positional tolerances of sitting or standing for 60 minute intervals without headache symptoms or cervicalgia     Patient stated goal:  Pt wishes to improve posture, increase strength and mobility in neck/upper back, get through work shifts without pain or spasm

## 2025-02-05 ENCOUNTER — PHARMACY VISIT (OUTPATIENT)
Dept: PHARMACY | Facility: CLINIC | Age: 50
End: 2025-02-05
Payer: COMMERCIAL

## 2025-02-05 ENCOUNTER — APPOINTMENT (OUTPATIENT)
Dept: RADIOLOGY | Facility: HOSPITAL | Age: 50
End: 2025-02-05
Payer: COMMERCIAL

## 2025-02-05 LAB — TSH SERPL-ACNC: 1.33 MIU/L

## 2025-02-06 ENCOUNTER — HOSPITAL ENCOUNTER (OUTPATIENT)
Dept: RADIOLOGY | Facility: HOSPITAL | Age: 50
Discharge: HOME | End: 2025-02-06
Payer: COMMERCIAL

## 2025-02-06 DIAGNOSIS — E04.2 MULTIPLE THYROID NODULES: ICD-10-CM

## 2025-02-06 PROCEDURE — 76536 US EXAM OF HEAD AND NECK: CPT

## 2025-02-10 ENCOUNTER — APPOINTMENT (OUTPATIENT)
Dept: OBSTETRICS AND GYNECOLOGY | Facility: CLINIC | Age: 50
End: 2025-02-10
Payer: COMMERCIAL

## 2025-02-10 VITALS
DIASTOLIC BLOOD PRESSURE: 74 MMHG | SYSTOLIC BLOOD PRESSURE: 120 MMHG | HEIGHT: 68 IN | BODY MASS INDEX: 23.07 KG/M2 | WEIGHT: 152.2 LBS

## 2025-02-10 DIAGNOSIS — Z01.419 WELL WOMAN EXAM WITH ROUTINE GYNECOLOGICAL EXAM: Primary | ICD-10-CM

## 2025-02-10 DIAGNOSIS — N95.1 MENOPAUSAL SYNDROME ON HORMONE REPLACEMENT THERAPY: ICD-10-CM

## 2025-02-10 DIAGNOSIS — R35.0 URINARY FREQUENCY: ICD-10-CM

## 2025-02-10 DIAGNOSIS — Z79.890 MENOPAUSAL SYNDROME ON HORMONE REPLACEMENT THERAPY: ICD-10-CM

## 2025-02-10 PROCEDURE — RXMED WILLOW AMBULATORY MEDICATION CHARGE

## 2025-02-10 PROCEDURE — 3008F BODY MASS INDEX DOCD: CPT | Performed by: NURSE PRACTITIONER

## 2025-02-10 PROCEDURE — 1036F TOBACCO NON-USER: CPT | Performed by: NURSE PRACTITIONER

## 2025-02-10 PROCEDURE — 99396 PREV VISIT EST AGE 40-64: CPT | Performed by: NURSE PRACTITIONER

## 2025-02-10 RX ORDER — ESTRADIOL 0.05 MG/D
1 PATCH TRANSDERMAL
Qty: 4 PATCH | Refills: 11 | Status: SHIPPED | OUTPATIENT
Start: 2025-02-10 | End: 2026-02-10

## 2025-02-10 RX ORDER — CREAM BASE NO.135
60 CREAM (GRAM) MISCELLANEOUS
COMMUNITY
Start: 2025-02-03

## 2025-02-10 RX ORDER — PROGESTERONE 100 MG/1
100 CAPSULE ORAL DAILY
Qty: 30 CAPSULE | Refills: 11 | Status: SHIPPED | OUTPATIENT
Start: 2025-02-10

## 2025-02-10 RX ORDER — ESTRADIOL 0.05 MG/D
1 PATCH TRANSDERMAL
Qty: 4 PATCH | Refills: 11 | Status: SHIPPED | OUTPATIENT
Start: 2025-02-16 | End: 2025-02-10

## 2025-02-10 ASSESSMENT — PATIENT HEALTH QUESTIONNAIRE - PHQ9
2. FEELING DOWN, DEPRESSED OR HOPELESS: NOT AT ALL
SUM OF ALL RESPONSES TO PHQ9 QUESTIONS 1 & 2: 0
1. LITTLE INTEREST OR PLEASURE IN DOING THINGS: NOT AT ALL

## 2025-02-10 ASSESSMENT — PAIN SCALES - GENERAL: PAINLEVEL_OUTOF10: 0-NO PAIN

## 2025-02-10 NOTE — PROGRESS NOTES
Subjective   Patient ID: Florence Albarran is a 49 y.o. female who presents for Annual Exam (PAP 2023/).  HPI  Patient states she is not sleeping great and having frequency in urination at night, worsened within the last 6 months. Admits to mood changes as well. Also getting occasional sharp pains in lower abdomen R>L, in the evening x2/week that began 2 months ago, shooting pain for a few seconds.     Sleep changes: yes   Mood changes: yes   VMS: yes   Joint pain: no   Fatigue: yes   Increase in migraines    History of:   DVT/PE: no   HTN: yes   High cholesterol: no   Personal history of breast cancer: no       LMP: amenorrhea d/t continuos cocp  menarche: 14     Contraception: cocp  happy with it?: yes, continuos helps with her migraines     H/O of STI: genital warts  requesting sti testing? 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 yes  exercise: no  seatbelt: yes  alcohol use: none  tobacco use: no  drug use: no  calcium intake: adequate     FH of breast cancer: maternal aunt  FH of ovarian cancer: no  FH of colon cancer: no  FH of pancreatic cancer: no        Review of Systems    Objective   Physical Exam  Vitals and nursing note reviewed.   Constitutional:       Appearance: Normal appearance.   Cardiovascular:      Rate and Rhythm: Normal rate and regular rhythm.      Heart sounds: Normal heart sounds.   Pulmonary:      Effort: Pulmonary effort is normal.      Breath sounds: Normal breath sounds.   Chest:   Breasts:     Right: Normal.      Left: Normal.   Abdominal:      Tenderness: There is no abdominal tenderness.   Genitourinary:     General: Normal vulva.      Exam position: Lithotomy position.      Labia:         Right: No lesion.         Left: No lesion.       Vagina: Normal.       Cervix: Normal.      Uterus: Normal.       Adnexa: Right adnexa normal and left adnexa normal.      Comments: No pain with palpation of the pelvic floor mucscles  Skin:     General: Skin is warm and dry.   Neurological:      Mental Status: She is alert.   Psychiatric:         Attention and Perception: Attention normal.         Mood and Affect: Mood normal.         Speech: Speech normal.         Behavior: Behavior normal.         Thought Content: Thought content normal.         Cognition and Memory: Cognition and memory normal.         Judgment: Judgment normal.         Assessment/Plan   Diagnoses and all orders for this visit:  Well woman exam with routine gynecological exam  Menopausal syndrome on hormone replacement therapy  -     progesterone (Prometrium) 100 mg capsule; Take 1 capsule (100 mg) by mouth once daily. Take at bedtime  -     estradiol (Climara) 0.05 mg/24 hr patch; Place 1 patch over 7 days on the skin 1 (one) time per week.  Urinary frequency  -     Urinalysis with Reflex Culture and Microscopic    Discontinue cocp and begin estradiol patch 0.05 mg/hr with 100 mg progesterone PO at bedtime,RBA discussed   UA obtained to rule out urinary infection   Follow up in 6-8 weeks      Courtney Kehr, PA-S Jean M Marino, VERÓNICA-CNP 02/10/25 8:04 AM

## 2025-02-11 ENCOUNTER — PHARMACY VISIT (OUTPATIENT)
Dept: PHARMACY | Facility: CLINIC | Age: 50
End: 2025-02-11
Payer: COMMERCIAL

## 2025-02-11 LAB
APPEARANCE UR: ABNORMAL
BACTERIA #/AREA URNS HPF: ABNORMAL /HPF
BACTERIA UR CULT: ABNORMAL
BILIRUB UR QL STRIP: NEGATIVE
CAOX CRY #/AREA URNS HPF: ABNORMAL /HPF
COLOR UR: ABNORMAL
GLUCOSE UR QL STRIP: NEGATIVE
HGB UR QL STRIP: NEGATIVE
HYALINE CASTS #/AREA URNS LPF: ABNORMAL /LPF
KETONES UR QL STRIP: ABNORMAL
LEUKOCYTE ESTERASE UR QL STRIP: ABNORMAL
NITRITE UR QL STRIP: NEGATIVE
PH UR STRIP: 5.5 [PH] (ref 5–8)
PROT UR QL STRIP: ABNORMAL
RBC #/AREA URNS HPF: ABNORMAL /HPF
SERVICE CMNT-IMP: ABNORMAL
SP GR UR STRIP: 1.04 (ref 1–1.03)
SQUAMOUS #/AREA URNS HPF: ABNORMAL /HPF
WBC #/AREA URNS HPF: ABNORMAL /HPF

## 2025-02-12 DIAGNOSIS — E04.1 THYROID NODULE: Primary | ICD-10-CM

## 2025-02-13 PROCEDURE — RXMED WILLOW AMBULATORY MEDICATION CHARGE

## 2025-02-14 ENCOUNTER — PHARMACY VISIT (OUTPATIENT)
Dept: PHARMACY | Facility: CLINIC | Age: 50
End: 2025-02-14
Payer: COMMERCIAL

## 2025-02-14 ENCOUNTER — TREATMENT (OUTPATIENT)
Dept: PHYSICAL THERAPY | Facility: HOSPITAL | Age: 50
End: 2025-02-14
Payer: COMMERCIAL

## 2025-02-14 DIAGNOSIS — M62.838 NECK MUSCLE SPASM: ICD-10-CM

## 2025-02-14 DIAGNOSIS — G44.209 TENSION-TYPE HEADACHE, NOT INTRACTABLE: ICD-10-CM

## 2025-02-14 PROCEDURE — 97110 THERAPEUTIC EXERCISES: CPT | Mod: GP | Performed by: PHYSICAL THERAPIST

## 2025-02-14 PROCEDURE — 97032 APPL MODALITY 1+ESTIM EA 15: CPT | Mod: GP | Performed by: PHYSICAL THERAPIST

## 2025-02-14 NOTE — PROGRESS NOTES
Physical Therapy Treatment    Patient Name:Florence Albarran  MRN:[id number]  Today's Date:2/14/2025  Referred by: Shine Tan  Time Calculation  Start Time: 0733  Stop Time: 0817  Time Calculation (min): 44 min    Therapy Diagnosis  Problem List Items Addressed This Visit             ICD-10-CM    Neck muscle spasm M62.838     Other Visit Diagnoses         Codes    Tension-type headache, not intractable     G44.209            Assessment: Palpation to R/L UT trigger points less than previous session but continued rounded shoulder posture observed. Continued with IDN to R UT with attended e-stim to her R UT today with goal to release muscular trigger points in the area of complain followed by manual therapy techniques TrP release and STM to the R UT today and postural education/upper back mobility. Myofascial trigger points revealed in the R UT muscle(s) by dry needle technique with noted needle fibrillation, local twitch response, reproduction of symptoms including but not limited to aching, burning and electricity. Noted are atypical tissue morphology characteristics of abnormal density, palpable margins, contracted/fibrotic muscle and fascial tissue with resistance to penetration. These characteristics reflect abnormal tissue function, innervation and nervous system communication. She continues to respond with muscle twitch with needle insertion and improved reported symptoms so needling with e-stim remains beneficial. Remainder of session focus on active postural strengthening and correction.       Plan: Continue IDN+ e-stim and manual techniques, progress thoracic spinal mobility and postural strengthening exercises      Insurance  Visit number: 9 (visit 5 of 6 new auth)  Approved number of visits: 6 VISITS  Onset Date: 10/10/2024  Auth/Cert End Date: 1/3/2025 - 4/3/25     Precautions/Fall Risk: none  Pacemaker no  Seizures No  Post Op Movement/Restrictions No     Subjective/Pain: Pt reports she has headaches  "daily still but overall with the therapy, IDN/E-stim and daily compliance with HEP she has been noticing some improvements in her complaints overall.   Current Pain Level (0-10): 4/10    HEP Compliance: Good    Objective/Outcome Measures:  TrP to R UT muscle belly  Fwd head, rounded shoulders at rest  Thoracic kyphosis with decreased mobility into extension; R shoulder rests in slight IR and adduction     Treatment  Therapeutic Procedure PT Therapeutic Procedures Time Entry  Therapeutic Exercise Time Entry: 24 minutes    Therapeutic Exercise 24 minutes -   5 min Backwards UBE lvl 3 focusing on postural engagement   Cybex Rows: 25 # 2 x 10   Dumbbell Shrugs + backward Rolls 10# x 30  Bent Over \"Y's\" x 10 3#    Manual Therapy   minutes - Not today  Manual R UT, R LS, R SCM and R scalene stretches  TPR to R UT, R LS  STM/TPR to R cervical paraspinals, B upper trap, R levator scap, R SCM and R scalenes    Neuromuscular Re-ed   minutes      Gait Training   minutes    Modalities -Attended [id number] 1 unit    Electrical Stimulation          20 minutes Trigger point needling was performed to the following muscles R UT with E-stim on continuous 5Hz, 150 us for 8 minutes. All trigger/tender points were marked and noted. Patient was prepped with 70% Isopropyl alcohol to the site(s). Sterile, single use, solid filament needles were inserted at various depths and angles to release tight tissue, improve microcirculation and remove neuro-noxious chemicals via a myofascial twitch response.     OP EDUCATION:       Goals:  Cervical Spine Goals:  Cervical Spine Goals: By discharge, patient will:  1) Demonstrate independence with home exercise program  2) Tolerate increased exercise without adverse reaction for return to PLOF.   3) Improve spinal posture to demonstrate proper scapular position and posture for increased positional tolerances  4) Increase ROM of the cervical and thoracic spine to WFL in order to improve the ability to " perform essential ADLs  5) Increase strength of  DNF, brigida-scapular and RTC musculature to 4/5 grossly in order to improve the ability to perform essential ADLs  6) Report decrease in pain by >= 2 points to meet MCID  7) Decrease score of NDI by >5 points to meet MCID  8) Be able to sleep through the night without disturbances and/or an increase in symptoms  9) Be able to sit at work desk for 60 minute intervals without flare ups in pain or headache symptoms  10) Improve positional tolerances of sitting or standing for 60 minute intervals without headache symptoms or cervicalgia     Patient stated goal:  Pt wishes to improve posture, increase strength and mobility in neck/upper back, get through work shifts without pain or spasm

## 2025-02-17 PROCEDURE — RXMED WILLOW AMBULATORY MEDICATION CHARGE

## 2025-02-18 ENCOUNTER — TREATMENT (OUTPATIENT)
Dept: PHYSICAL THERAPY | Facility: HOSPITAL | Age: 50
End: 2025-02-18
Payer: COMMERCIAL

## 2025-02-18 DIAGNOSIS — M62.838 NECK MUSCLE SPASM: ICD-10-CM

## 2025-02-18 DIAGNOSIS — G44.209 TENSION-TYPE HEADACHE, NOT INTRACTABLE: ICD-10-CM

## 2025-02-18 PROCEDURE — RXMED WILLOW AMBULATORY MEDICATION CHARGE

## 2025-02-18 PROCEDURE — 97140 MANUAL THERAPY 1/> REGIONS: CPT | Mod: GP | Performed by: PHYSICAL THERAPIST

## 2025-02-18 PROCEDURE — 97110 THERAPEUTIC EXERCISES: CPT | Mod: GP | Performed by: PHYSICAL THERAPIST

## 2025-02-18 NOTE — PROGRESS NOTES
Physical Therapy Treatment    Patient Name:Florence Albarran  MRN:[id number]  Today's Date:2/18/2025  Referred by: Shine Tan  Time Calculation  Start Time: 0735  Stop Time: 0819  Time Calculation (min): 44 min    Therapy Diagnosis  Problem List Items Addressed This Visit             ICD-10-CM    Neck muscle spasm M62.838     Other Visit Diagnoses         Codes    Tension-type headache, not intractable     G44.209            Assessment: Held e-stim+ dry needling and modalities this date due to patient's skin reaction. Performed STM and TrP Release to R UT manually this date followed by postural mobility and strengthening provided with verbal and visual cues throughout remainder of session.     Plan: Progress to Phase II Cash Dry Needling for continued care      Insurance  Visit number: 10 (visit 6 of 6 new auth)  Approved number of visits: 6 VISITS  Onset Date: 10/10/2024  Auth/Cert End Date: 1/3/2025 - 4/3/25     Precautions/Fall Risk: none  Pacemaker no  Seizures No  Post Op Movement/Restrictions No     Subjective/Pain: Pt tried a newly prescribed topical cream for her pain which gave her skin a significant reaction to . She reports it's better than it was but still having some hives.   Current Pain Level (0-10): 4/10    HEP Compliance: Good    Objective/Outcome Measures:  TrP to R UT muscle belly  Fwd head, rounded shoulders at rest  Thoracic kyphosis with decreased mobility into extension; R shoulder rests in slight IR and adduction     Treatment  Therapeutic Procedure PT Therapeutic Procedures Time Entry  Manual Therapy Time Entry: 24  Therapeutic Exercise Time Entry: 20 minutes    Therapeutic Exercise 20 minutes   6 min Backwards UBE lvl 3 focusing on postural engagement   Cybex Rows: 25 # 2 x 10   Supine Wall [place] on 1/2 Foam Roll: 2 x 10   Bent Over Row: 8# x 20 R/L    Manual Therapy 24 minutes - Not today  Manual R UT, R LS, R SCM and R scalene stretches  TPR to R UT, R LS  STM/TPR to R cervical  paraspinals, B upper trap, R levator scap, R SCM and R scalenes    Neuromuscular Re-ed   minutes      Gait Training   minutes    Modalities -Attended [id number] Not today    Electrical Stimulation            minutes Trigger point needling was performed to the following muscles R UT with E-stim on continuous 5Hz, 150 us for 8 minutes. All trigger/tender points were marked and noted. Patient was prepped with 70% Isopropyl alcohol to the site(s). Sterile, single use, solid filament needles were inserted at various depths and angles to release tight tissue, improve microcirculation and remove neuro-noxious chemicals via a myofascial twitch response.     OP EDUCATION:       Goals:  Cervical Spine Goals:  Cervical Spine Goals: By discharge, patient will:  1) Demonstrate independence with home exercise program-Met  2) Tolerate increased exercise without adverse reaction for return to OF. -MET  3) Improve spinal posture to demonstrate proper scapular position and posture for increased positional tolerances-Progressing  4) Increase ROM of the cervical and thoracic spine to WFL in order to improve the ability to perform essential ADLs  5) Increase strength of  DNF, brigida-scapular and RTC musculature to 4/5 grossly in order to improve the ability to perform essential ADLs  6) Report decrease in pain by >= 2 points to meet MCID-Partially met  7) Decrease score of NDI by >5 points to meet MCID  8) Be able to sleep through the night without disturbances and/or an increase in symptoms-Not met  9) Be able to sit at work desk for 60 minute intervals without flare ups in pain or headache symptoms-Partially Met  10) Improve positional tolerances of sitting or standing for 60 minute intervals without headache symptoms or cervicalgia- Progressing (30-60 min)     Patient stated goal:  Pt wishes to improve posture, increase strength and mobility in neck/upper back, get through work shifts without pain or spasm

## 2025-02-20 ENCOUNTER — SPECIALTY PHARMACY (OUTPATIENT)
Dept: PHARMACY | Facility: CLINIC | Age: 50
End: 2025-02-20

## 2025-02-20 PROCEDURE — RXMED WILLOW AMBULATORY MEDICATION CHARGE

## 2025-02-21 ENCOUNTER — PROCEDURE VISIT (OUTPATIENT)
Dept: NEUROLOGY | Facility: HOSPITAL | Age: 50
End: 2025-02-21
Payer: COMMERCIAL

## 2025-02-21 VITALS
DIASTOLIC BLOOD PRESSURE: 76 MMHG | RESPIRATION RATE: 18 BRPM | WEIGHT: 152.12 LBS | BODY MASS INDEX: 23.05 KG/M2 | HEART RATE: 78 BPM | HEIGHT: 68 IN | SYSTOLIC BLOOD PRESSURE: 115 MMHG

## 2025-02-21 DIAGNOSIS — G43.711 INTRACTABLE CHRONIC MIGRAINE WITHOUT AURA AND WITH STATUS MIGRAINOSUS: ICD-10-CM

## 2025-02-21 DIAGNOSIS — M54.2 NECK PAIN: ICD-10-CM

## 2025-02-21 PROCEDURE — 2500000004 HC RX 250 GENERAL PHARMACY W/ HCPCS (ALT 636 FOR OP/ED): Performed by: PSYCHIATRY & NEUROLOGY

## 2025-02-21 PROCEDURE — 64615 CHEMODENERV MUSC MIGRAINE: CPT | Performed by: PSYCHIATRY & NEUROLOGY

## 2025-02-21 PROCEDURE — 96372 THER/PROPH/DIAG INJ SC/IM: CPT | Mod: 59 | Performed by: PSYCHIATRY & NEUROLOGY

## 2025-02-21 RX ORDER — KETOROLAC TROMETHAMINE 10 MG/1
10 TABLET, FILM COATED ORAL EVERY 6 HOURS
Qty: 20 TABLET | Refills: 0 | Status: SHIPPED | OUTPATIENT
Start: 2025-02-21 | End: 2025-02-26

## 2025-02-21 RX ORDER — TIZANIDINE HYDROCHLORIDE 4 MG/1
8 CAPSULE, GELATIN COATED ORAL AS NEEDED
Qty: 60 CAPSULE | Refills: 6 | Status: SHIPPED | OUTPATIENT
Start: 2025-02-21

## 2025-02-21 RX ORDER — KETOROLAC TROMETHAMINE 30 MG/ML
60 INJECTION, SOLUTION INTRAMUSCULAR; INTRAVENOUS ONCE
Status: COMPLETED | OUTPATIENT
Start: 2025-02-21 | End: 2025-02-21

## 2025-02-21 RX ADMIN — ONABOTULINUMTOXINA 100 UNITS: 100 INJECTION, POWDER, LYOPHILIZED, FOR SOLUTION INTRADERMAL; INTRAMUSCULAR at 16:48

## 2025-02-21 RX ADMIN — KETOROLAC TROMETHAMINE 60 MG: 60 INJECTION, SOLUTION INTRAMUSCULAR at 11:00

## 2025-02-21 ASSESSMENT — PAIN SCALES - GENERAL: PAINLEVEL_OUTOF10: 0-NO PAIN

## 2025-02-24 ENCOUNTER — PHARMACY VISIT (OUTPATIENT)
Dept: PHARMACY | Facility: CLINIC | Age: 50
End: 2025-02-24
Payer: COMMERCIAL

## 2025-02-24 NOTE — PROGRESS NOTES
Patient ID: Florence Albarran is a 49 y.o. female.    Head/Face/Jaw Botulinum Injection    Date/Time: 2/21/2025 4:48 PM    Performed by: Scarlet Munoz MD  Authorized by: Scarlet Munoz MD      Consent:      Consent obtained:  Verbal     Consent given by:  Patient    Procedure details:      EMG used?  No     Electrical stimulation used?  No     Diluted by:  Preservative free saline     Medications: 100 Units onabotulinumtoxinA 100 unit; 100 Units onabotulinumtoxinA 100 unit      Comments about vials and dilution:  Spp     Total units available:  200       Ad hoc region injected:  Head see diagram with 200 units     Total units injected:  200     Total units wasted:  0    Post-procedure details:      Patient tolerance of procedure:  Tolerated well, no immediate complications    Comments: Please do not rub areas for 24 hours.  No pressure above eyebrows for 24 hours.  Watch out for helmets, headlamps, headbands, goggles, or massage for 24 hours.  If there is discomfort, ice for the first 24 hour,s heat after that.  Headaches may worsen, or you may experience neck stiffness.  If this occurs use your usual headache medication or a mild anti inflammatory such as advil or aleve.  Please call if you have difficulty swallowing.    You received Toradol today for your severe headache.  We are going to continue with 5 day of pills starting tomorrow to break your headache cycle.  Take 1 pill with breakfast lunch dinner and bedtime for 5 days.  Take with food.  Try not to take your triptan or antiinflammatory during this time.  If you have any nausea or diarrhea with the medicine stop and call on the next business day.

## 2025-02-25 ENCOUNTER — APPOINTMENT (OUTPATIENT)
Dept: PHYSICAL THERAPY | Facility: HOSPITAL | Age: 50
End: 2025-02-25
Payer: COMMERCIAL

## 2025-02-26 ENCOUNTER — OFFICE VISIT (OUTPATIENT)
Dept: PAIN MEDICINE | Facility: HOSPITAL | Age: 50
End: 2025-02-26
Payer: COMMERCIAL

## 2025-02-26 DIAGNOSIS — M54.12 CERVICAL RADICULOPATHY: Primary | ICD-10-CM

## 2025-02-26 DIAGNOSIS — G43.719 INTRACTABLE CHRONIC MIGRAINE WITHOUT AURA AND WITHOUT STATUS MIGRAINOSUS: ICD-10-CM

## 2025-02-26 DIAGNOSIS — M50.20 CERVICAL DISC DISPLACEMENT: ICD-10-CM

## 2025-02-26 DIAGNOSIS — E04.2 MULTIPLE THYROID NODULES: ICD-10-CM

## 2025-02-26 PROCEDURE — 99214 OFFICE O/P EST MOD 30 MIN: CPT | Performed by: PAIN MEDICINE

## 2025-02-26 PROCEDURE — 99204 OFFICE O/P NEW MOD 45 MIN: CPT | Performed by: PAIN MEDICINE

## 2025-02-26 ASSESSMENT — PAIN SCALES - GENERAL: PAINLEVEL_OUTOF10: 5 - MODERATE PAIN

## 2025-02-26 ASSESSMENT — PAIN - FUNCTIONAL ASSESSMENT: PAIN_FUNCTIONAL_ASSESSMENT: 0-10

## 2025-02-26 NOTE — PROGRESS NOTES
Subjective   Patient ID: Florence Albarran is a 49 y.o. female with a past medical history of GRAIN headaches, chronic neck pain, who presents today for evaluation of her neck pain.  Patient states that her neck pain started about a year ago insidiously.  She denied any traumatic events to explain it.  Pain is primarily in the right trapezius muscle at the base of the neck, and is worse with any activity that involves movements of the right upper extremity.  She has attempted several medications including muscle relaxants and Botox injections to the right trapezius without any significant relief.  She has also attempted trigger point injections to the area without any relief.  She attempted physical therapy without any relief. The pain causes significant stress in the patient's life, interfering with general activity, mood, ability to walk distances, ability to perform tasks at home and/or work. Patient participates in physical therapy, and continues to perform physician directed exercises at home. Patient denies any bowel or bladder incontinence, saddle anesthesia, weaknesses, or falls.      Review of Systems   13-point ROS done and negative except for HPI.     Current Outpatient Medications   Medication Instructions    aspirin-acetaminophen-caffeine (Excedrin Extra Strength) 250-250-65 mg tablet 2 tablets, Daily    baclofen (LIORESAL) 10 mg, oral, 3 times daily    clobetasol (Temovate) 0.05 % cream     cream base no.171 (COMPOUNDMAX BASE MISC) APPLY 1-2 GRAMS (1-2 PUMPS) TOPICALLY EVERY 6-8 HOURS AS NEEDED FOR PAIN. (BACLOFEN 5%, CYCLOBENZAPRINE 2%, DICLOFENAC 3%, GABAPENTIN 10%, LIDOCAINE 5% IN SALTSTABLE)    dihydroergotamine (Trudhesa) 0.725 mg/pump act. (4 mg/mL) nasal spray 1 spray, Each Nostril, Every 1 hour PRN, Dose may be repeated in 1 hour after the first dose. No more than 2 doses within 24 hours or 3 doses within 7 days.    estradiol (Climara) 0.05 mg/24 hr patch 1 patch, transdermal, Once Weekly     fremanezumab (Ajovy Autoinjector) 225 mg/1.5 mL auto-injector INJECT 225 MG (1 PEN) UNDER THE SKIN ONCE A MONTH AS DIRECTED.    gabapentin (NEURONTIN) 600 mg, oral, Daily    ketorolac (TORADOL) 10 mg, oral, Every 6 hours    latanoprost (Xalatan) 0.005 % ophthalmic solution 1 drop, 2 times daily    lidocaine (Lidoderm) 5 % patch 1 patch, transdermal, Daily, Remove & discard patch within 12 hours or as directed by MD.    loteprednol (Lotemax) 0.5 % ophthalmic suspension INSTILL 1 TO 2 DROPS THREE TIMES DAILY TO BOTH EYES.    methocarbamol (Robaxin) 500 mg tablet take 1 to 2 tablets by mouth at bedtime    metoprolol succinate XL (TOPROL-XL) 50 mg, oral, Daily, Do not crush or chew.    Mounjaro 5 mg, Every 7 days    Myrbetriq 50 mg, oral, Daily    omeprazole (PRILOSEC) 20 mg, oral, Daily before breakfast    onabotulinumtoxinA (Botox) 100 unit injection PHYSICIAN TO INJECT 200 UNITS INTRAMUSCULARLY TO HEAD AND NECK EVERY 90 DAYS    ondansetron (Zofran) 8 mg tablet TAKE ONE (1) TABLET BY MOUTH EVERY 8 HOURS AS NEEDED.    plecanatide (Trulance) tablet tablet 1 tablet, Daily    progesterone (PROMETRIUM) 100 mg, oral, Daily, Take at bedtime    rimegepant (Nurtec ODT) 75 mg tablet,disintegrating Allow one (1) tablet to dissolve on or under the tongue once daily as needed for migraine.    rizatriptan MLT (Maxalt-MLT) 10 mg disintegrating tablet allow 1 tablet by mouth to dissolve on or under the tongue at the onset of headache. may repeat every 2 hours as needed. maximum of 3 tablets in 24 hours    SaltStable LS cream 60 g    tiZANidine (ZANAFLEX) 8 mg, oral, As needed    tretinoin (Retin-A) 0.025 % cream     triamcinolone (Kenalog) 0.1 % cream     Ubrelvy 100 mg, oral, Once as needed, MAY REPEAT IN 2 HOURS. MAX DOSE 2 TABLETS IN 24 HOURS.    Xiidra 5 % dropperette 1 drop, 2 times daily       Past Medical History:   Diagnosis Date    Acute atopic conjunctivitis, unspecified eye 12/20/2017    Allergic conjunctivitis    Acute  candidiasis of vulva and vagina 04/02/2015    Vaginal yeast infection    Anogenital (venereal) warts 02/24/2016    Genital warts    Anogenital (venereal) warts 01/20/2016    Genital warts    Anogenital (venereal) warts 11/18/2015    Genital warts    Body mass index (BMI) 27.0-27.9, adult 09/19/2018    BMI 27.0-27.9,adult    Calculus of kidney 11/10/2017    Kidney stone on left side    Cervicalgia 06/01/2018    Cervicalgia of lwqwipvu-sgmuhph-ebxne region    Cervicalgia 03/16/2018    Cervicalgia of dnrjgsxz-unwwvfv-srfnv region    Cervicalgia 07/20/2018    Cervicalgia of sofwnfgm-naogmhq-trmen region    Cervicalgia 05/08/2018    Cervicalgia of lnilqbqy-otwnztv-qmvkd region    Cervicalgia 08/10/2018    Cervicalgia of yfvtqzrn-waffogq-fckql region    Chronic migraine without aura, intractable, with status migrainosus 06/01/2018    Chronic migraine without aura, with intractable migraine, so stated, with status migrainosus    Chronic migraine without aura, intractable, with status migrainosus 07/20/2018    Chronic migraine without aura, with intractable migraine, so stated, with status migrainosus    Chronic migraine without aura, intractable, with status migrainosus 05/08/2018    Chronic migraine without aura, with intractable migraine, so stated, with status migrainosus    Chronic migraine without aura, intractable, with status migrainosus 08/10/2018    Chronic migraine without aura, with intractable migraine, so stated, with status migrainosus    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 10/05/2018    Vitamin B 12 deficiency    Encounter for gynecological examination (general) (routine) without abnormal findings 11/10/2017    Well woman exam with routine gynecological exam    Encounter for other preprocedural examination 07/29/2019    Preoperative clearance    Encounter for screening for infections with a predominantly sexual mode of transmission 06/01/2018    Screening for STDs (sexually transmitted diseases)     Encounter for screening for infections with a predominantly sexual mode of transmission 06/01/2018    Routine screening for STI (sexually transmitted infection)    Encounter for screening for infections with a predominantly sexual mode of transmission 07/20/2018    Screening for STDs (sexually transmitted diseases)    Encounter for screening for infections with a predominantly sexual mode of transmission 07/20/2018    Routine screening for STI (sexually transmitted infection)    Encounter for screening for infections with a predominantly sexual mode of transmission 05/08/2018    Screening for STDs (sexually transmitted diseases)    Encounter for screening for infections with a predominantly sexual mode of transmission 05/08/2018    Routine screening for STI (sexually transmitted infection)    Encounter for screening for infections with a predominantly sexual mode of transmission 08/10/2018    Screening for STDs (sexually transmitted diseases)    Encounter for screening for infections with a predominantly sexual mode of transmission 08/10/2018    Routine screening for STI (sexually transmitted infection)    Encounter for screening for infections with a predominantly sexual mode of transmission     Screening for STDs (sexually transmitted diseases)    Encounter for screening for infections with a predominantly sexual mode of transmission 11/10/2017    Routine screening for STI (sexually transmitted infection)    Encounter for screening for malignant neoplasm of colon 11/05/2017    Colon cancer screening    Encounter for screening for malignant neoplasm of colon 09/26/2018    Colon cancer screening    Encounter for screening mammogram for malignant neoplasm of breast 06/01/2018    Encounter for screening mammogram for breast cancer    Encounter for screening mammogram for malignant neoplasm of breast 07/20/2018    Encounter for screening mammogram for breast cancer    Encounter for screening mammogram for malignant  neoplasm of breast 05/08/2018    Encounter for screening mammogram for breast cancer    Encounter for screening mammogram for malignant neoplasm of breast 08/10/2018    Encounter for screening mammogram for breast cancer    Fever, unspecified 09/10/2018    Febrile illness    Left upper quadrant pain 06/16/2020    Colicky LUQ abdominal pain    Meibomian gland dysfunction of right eye, unspecified eyelid 08/10/2018    Meibomian gland dysfunction (MGD) of right eye    Migraine, unspecified, not intractable, without status migrainosus 08/10/2018    Migraine headache    Ocular pain, left eye 09/28/2018    Ocular pain, left eye    Ocular pain, right eye 09/28/2018    Ocular pain, right eye    Other specified noninflammatory disorders of vulva and perineum 02/24/2016    Vulvar lesion    Pain disorder with related psychological factors 10/01/2018    Pain disorder associated with psychological and physical factors    Pain in unspecified shoulder 06/04/2021    Shoulder pain    Personal history of diseases of the skin and subcutaneous tissue 09/18/2017    History of cellulitis    Personal history of other diseases of the digestive system 03/28/2017    History of constipation    Personal history of other diseases of the respiratory system 09/10/2018    History of pharyngitis    Personal history of other diseases of the respiratory system 09/11/2018    History of sinusitis    Personal history of other infectious and parasitic diseases 02/26/2016    History of genital warts    Personal history of other specified conditions 10/05/2018    History of nausea    Personal history of other specified conditions 03/28/2017    History of epigastric pain    Personal history of other specified conditions 10/23/2017    History of dysuria    Personal history of other specified conditions 09/19/2018    History of epigastric pain    Personal history of other specified conditions 09/03/2019    History of palpitations    Personal history of other  specified conditions 12/20/2017    History of palpitations    Personal history of urinary (tract) infections 05/29/2015    History of urinary tract infection    Personal history of urinary (tract) infections 04/20/2017    History of urinary tract infection    Personal history of urinary calculi 12/08/2017    History of renal calculi    Right knee pain     Unspecified condition associated with female genital organs and menstrual cycle 02/24/2016    Genital lesion, female    Urinary tract infection, site not specified 04/16/2019    Acute UTI    Urinary tract infection, site not specified 04/16/2021    Acute UTI    Urinary tract infection, site not specified 09/11/2018    Acute UTI        Past Surgical History:   Procedure Laterality Date    DILATION AND CURETTAGE OF UTERUS  06/15/2015    Dilation And Curettage Of Cervical Stump    LAPAROSCOPY DIAGNOSTIC / BIOPSY / ASPIRATION / LYSIS  06/15/2015    Exploratory Laparoscopy    MR HEAD ANGIO WO IV CONTRAST  11/17/2019    MR HEAD ANGIO WO IV CONTRAST 11/17/2019 [facility]    MR NECK ANGIO WO IV CONTRAST  11/17/2019    MR NECK ANGIO WO IV CONTRAST 11/17/2019 [facility]        Family History   Problem Relation Name Age of Onset    Lymphoma Mother      No Known Problems Father      Breast cancer Mother's Sister  69    Ovarian cancer Neg Hx          No Known Allergies     Objective     There were no vitals filed for this visit.     Physical Exam  General: NAD, well groomed, well nourished  Eyes: Non-icteric sclera, EOMI  Ears, Nose, Mouth, and Throat: External ears and nose appear to be without deformity or rash. No lesions or masses noted. Hearing is grossly intact.   Neck: Supple, trachea midline, no appreciable lumps or lymph nodes  Respiratory: Nonlabored breathing   Cardiovascular: No peripheral edema observed  Skin: No rashes or open lesions/ulcers identified on skin.  Psychiatric: Alert, orientation to person, place, and time.  Cooperative.    Neurologic:   Cranial nerves grossly intact.   Strength: 5/5 and symmetric plantar/dorsiflexion   Sensation: Normal to light touch throughout; pinprick intact throughout.   DTRs:normal and symmetric throughout  Schultz: absent  Clonus: absent    Back:   Palpation: Tenderness to palpation over the right trapezius muscle at base of the neck/shoulder.   Spurling's test: positive on the right.    Imaging personally reviewed and independently interpreted:   MR cervical spine wo IV contrast 02/01/2025    Narrative  Interpreted By:  Anjel Mock,  STUDY:  MR CERVICAL SPINE WO IV CONTRAST;  2/1/2025 9:34 am    INDICATION:  Signs/Symptoms:c 3 radicular pain failed PT .nsaids. and muscle relax.    ,M54.12 Radiculopathy, cervical region    COMPARISON:  None.    ACCESSION NUMBER(S):  [id number]    ORDERING CLINICIAN:  DEBBIE GARDINER    TECHNIQUE:  Multiplanar, multisequence images of the cervical spine were obtained  without contrast.    FINDINGS:  PARTIALLY VISUALIZED SKULL BASE: No significant abnormality.    PARASPINAL SOFT TISSUES: No significant paraspinal muscle  abnormality. In the thyroid gland there are multiple nodules, the  largest measuring 1.5 cm in the inferior right lobe. The thyroid  gland also appears mildly heterogeneous overall.    SPINAL CORD: The cervical cord has a normal caliber and signal  intensity. No cord compression.    ALIGNMENT: No spondylolisthesis.    BONE MARROW/VERTEBRAE:  Overall bone marrow signal is within normal  limits. Vertebral body heights are maintained. No acute fracture.    SPINAL CANAL, INTERVERTEBRAL DISCS, AND NEURAL FORAMINA:    Craniocervical junction: No acute abnormality.    C1-C2: The atlantodental joint is intact. The predental space is not  widened.    C2-C3:  No significant disc protrusion, canal stenosis, or foraminal  stenosis.    C3-C4:  No significant disc protrusion, canal stenosis, or foraminal  stenosis.    C4-C5: Minimal central disc  protrusion. No significant canal or  neural foraminal stenosis.    C5-C6: Minimal central disc protrusion and tiny focal annular fissure  (axial T2WI 15, series 9). No significant canal or neural foraminal  stenosis.    C6-C7:  No significant disc protrusion, canal stenosis, or foraminal  stenosis.    C7-T1:  No significant disc protrusion, canal stenosis, or foraminal  stenosis.    Impression  1. Minimal central disc protrusions at C4-C5, C5-C6 without any  significant effect on the canal caliber. No significant neural  foraminal stenosis.    2. Heterogeneous thyroid gland with multiple nodules measuring up to  1.5 cm the right lobe. Recommend thyroid function testing and  follow-up nonemergent ultrasound of the thyroid gland for further  characterization. YELLOW ALERT: An incidental finding alert was sent  per protocol.      MACRO:  Incidental Finding:  There are few small hypoattenuating nodules  measuring equal to or greater than 1.5 cm in the thyroid gland.  (**-YCF-**)    Instructions:  Further evaluation with nonemergent thyroid ultrasound.  (Managing Incidental Thyroid Nodules Detected on Imaging: White Paper  of the ACR Incidental Thyroid Findings Committee. Angie Wood. et  al. Journal of the American College of Radiology,Volume 12, Issue 2,  143 - 150.) [email or web address]    Signed by: Anjel Mock 2/2/2025 1:30 PM  Dictation workstation:   [id number]     No image results found.     1. Multiple thyroid nodules  Referral to Pain Medicine           Assessment/Plan   Florence Albarran is a 49 y.o. female with a past medical history of GRAIN headaches, chronic neck pain, who presents today for evaluation of her neck pain.  Patient denies any radicular symptoms, loss of balance or any other red flag symptoms for cervical spinal injury.  MRI cervical spine without any significant changes, though patient's symptoms may be consistent with cervical radiculopathy in the C5 dermatome distribution.    Plan:  -  We will schedule patient for right cervical transforaminal epidural steroid injection at the C5-C6 level under ultrasound and fluoroscopy.  Patient denies take any blood thinners at this time.  The patient has already failed conservative therapies including medications such as acetaminophen, NSAIDs, and gabapentinoids; as well as physical therapy. Therefore, we discussed extensively the risks, benefits, and alternatives to the procedure. The patient's questions were addressed and answered in detail. The patient demonstrated understanding of the procedure, and is amenable to proceeding with it. The Risks of the procedure that were discussed with the patient include but are not limited to the following: A lack of efficacy, transient worsening of pain, bleeding, infection, nerve injury, nerve damage, neuritis or sunburn sensation, worsening control of blood glucose from steroid medication, epidural hematoma, posterior puncture headache, and paralysis.       Follow up: After procedure    The patient was invited to contact us back anytime with any questions or concerns and follow-up with us in the office as needed.     Diagnoses and all orders for this visit:  Multiple thyroid nodules  -     Referral to Pain Medicine      This note was generated with the aid of dictation software, there may be typos despite my attempts at proofreading.     Jeanine Bowling MD  Interventional Pain Medicine Fellow  [facility]

## 2025-02-27 DIAGNOSIS — N32.81 OVERACTIVE BLADDER: ICD-10-CM

## 2025-02-27 DIAGNOSIS — R35.0 URINARY FREQUENCY: ICD-10-CM

## 2025-03-04 ENCOUNTER — TREATMENT (OUTPATIENT)
Dept: PHYSICAL THERAPY | Facility: HOSPITAL | Age: 50
End: 2025-03-04
Payer: COMMERCIAL

## 2025-03-04 DIAGNOSIS — G44.209 TENSION-TYPE HEADACHE, NOT INTRACTABLE: ICD-10-CM

## 2025-03-04 DIAGNOSIS — M62.838 NECK MUSCLE SPASM: ICD-10-CM

## 2025-03-04 PROCEDURE — 4200000004 HC PT PHASE II 15 MIN CHG: Mod: GP | Performed by: PHYSICAL THERAPIST

## 2025-03-04 NOTE — PROGRESS NOTES
Physical Therapy Treatment    Patient Name:Florence Albarran  MRN:[id number]  Today's Date:3/4/2025  Referred by: Shine Tan  Time Calculation  Start Time: 0731  Stop Time: 0751  Time Calculation (min): 20 min    Therapy Diagnosis  Problem List Items Addressed This Visit             ICD-10-CM    Neck muscle spasm M62.838     Other Visit Diagnoses         Codes    Tension-type headache, not intractable     G44.209            Assessment: Myofascial trigger points revealed in the R UT muscle(s) by dry needle technique with noted needle fibrillation, local twitch response, reproduction of symptoms including but not limited to aching, burning and electricity. Noted are atypical tissue morphology characteristics of abnormal density, palpable margins, contracted/fibrotic muscle and fascial tissue with resistance to penetration. These characteristics reflect abnormal tissue function, innervation and nervous system communication. Use of Electrical stimulation into 4 needles of the UT utilized for TrP release and pain management.    Plan: Progress to Phase II Cash Dry Needling for continued care PRN      Insurance  Visit number: Phase II Cash Dry needling    Precautions/Fall Risk: none  Pacemaker no  Seizures No  Post Op Movement/Restrictions No     Subjective/Pain: Pt is doing okay, she continues to perform her HEP and use modalities to alleviate pain.  Current Pain Level (0-10): 4/10    HEP Compliance: Good    Objective/Outcome Measures:  TrP to R UT muscle belly - decreased    Treatment  Therapeutic Procedure   PHASE II 15 MIN CHG [id number] 20 minutes   Trigger point needling was performed to the following muscles R UT with E-stim on continuous 5Hz, 150 us for 8 minutes. All trigger/tender points were marked and noted. Patient was prepped with 70% Isopropyl alcohol to the site(s). Sterile, single use, solid filament needles were inserted at various depths and angles to release tight tissue, improve microcirculation and  remove neuro-noxious chemicals via a myofascial twitch response.     OP EDUCATION:       Goals:  Cervical Spine Goals:  Cervical Spine Goals: By discharge, patient will:  1) Demonstrate independence with home exercise program-Met  2) Tolerate increased exercise without adverse reaction for return to PLOF. -MET  3) Improve spinal posture to demonstrate proper scapular position and posture for increased positional tolerances-Progressing  4) Increase ROM of the cervical and thoracic spine to WFL in order to improve the ability to perform essential ADLs  5) Increase strength of  DNF, brigida-scapular and RTC musculature to 4/5 grossly in order to improve the ability to perform essential ADLs  6) Report decrease in pain by >= 2 points to meet MCID-Partially met  7) Decrease score of NDI by >5 points to meet MCID  8) Be able to sleep through the night without disturbances and/or an increase in symptoms-Not met  9) Be able to sit at work desk for 60 minute intervals without flare ups in pain or headache symptoms-Partially Met  10) Improve positional tolerances of sitting or standing for 60 minute intervals without headache symptoms or cervicalgia- Progressing (30-60 min)     Patient stated goal:  Pt wishes to improve posture, increase strength and mobility in neck/upper back, get through work shifts without pain or spasm

## 2025-03-11 ENCOUNTER — APPOINTMENT (OUTPATIENT)
Dept: PHYSICAL THERAPY | Facility: HOSPITAL | Age: 50
End: 2025-03-11
Payer: COMMERCIAL

## 2025-03-13 ENCOUNTER — APPOINTMENT (OUTPATIENT)
Facility: HOSPITAL | Age: 50
End: 2025-03-13
Payer: COMMERCIAL

## 2025-03-18 ENCOUNTER — TREATMENT (OUTPATIENT)
Dept: PHYSICAL THERAPY | Facility: HOSPITAL | Age: 50
End: 2025-03-18
Payer: COMMERCIAL

## 2025-03-18 ENCOUNTER — SPECIALTY PHARMACY (OUTPATIENT)
Dept: PHARMACY | Facility: CLINIC | Age: 50
End: 2025-03-18

## 2025-03-18 DIAGNOSIS — M62.838 NECK MUSCLE SPASM: Primary | ICD-10-CM

## 2025-03-18 DIAGNOSIS — G44.229 CHRONIC TENSION-TYPE HEADACHE, NOT INTRACTABLE: ICD-10-CM

## 2025-03-18 PROCEDURE — RXMED WILLOW AMBULATORY MEDICATION CHARGE

## 2025-03-18 PROCEDURE — 4200000004 HC PT PHASE II 15 MIN CHG: Mod: GP | Performed by: PHYSICAL THERAPIST

## 2025-03-18 NOTE — PROGRESS NOTES
Treatment note  Physical Therapy Treatment  Patient Name:Florence Albarran  MRN:[id number]  Today's Date:3/18/2025  Referred by: Shnie Tan  Time Calculation  Start Time: 0820  Stop Time: 0845  Time Calculation (min): 25 min    Therapy Diagnosis  1. Neck muscle spasm    2. Chronic tension-type headache, not intractable         Insurance  Visit number:  12 CASH PHASE II  Approved number of visits: CASH  Onset Date: 10/10/2024  Precautions/Fall Risk:none* Pacemaker no  Seizures No  Post Op Movement/Restrictions No    Subjective/Pain: Patient reports she is doing okay.   Current Pain Level (0-10): 3    Objective/Outcome Measures:      Treatment  Phase II Treatment 15 minutes  Trigger point needling was performed to the following muscles R UT. All trigger/tender points were marked and noted. Patient was prepped with 70% Isopropyl alcohol to the site(s). Sterile, single use, solid filament needles were inserted at various depths and angles to release tight tissue, improve microcirculation and remove neuro-noxious chemicals via a myofascial twitch response.     Assessment: Myofascial trigger points revealed in the R UT muscle(s) by dry needle technique with noted needle fibrillation, local twitch response, reproduction of symptoms including but not limited to aching, burning and electricity. Noted are atypical tissue morphology characteristics of abnormal density, palpable margins, contracted/fibrotic muscle and fascial tissue with resistance to penetration. These characteristics reflect abnormal tissue function, innervation and nervous system communication. Electric stimulation performed with dry needling techniques this date.     Plan: Continue PRN

## 2025-03-19 DIAGNOSIS — Z79.890 MENOPAUSAL SYNDROME ON HORMONE REPLACEMENT THERAPY: Primary | ICD-10-CM

## 2025-03-19 DIAGNOSIS — R32 URINARY INCONTINENCE, UNSPECIFIED TYPE: Primary | ICD-10-CM

## 2025-03-19 DIAGNOSIS — N95.1 MENOPAUSAL SYNDROME ON HORMONE REPLACEMENT THERAPY: Primary | ICD-10-CM

## 2025-03-19 RX ORDER — ESTRADIOL 0.07 MG/D
1 PATCH TRANSDERMAL WEEKLY
Qty: 4 PATCH | Refills: 11 | Status: SHIPPED | OUTPATIENT
Start: 2025-03-19 | End: 2026-03-19

## 2025-03-19 NOTE — PROGRESS NOTES
Decision to increase estradiol from 0.05mg to 0.075mg to see if moods and sleep improve more; her previous HA and spotting have resolved

## 2025-03-20 ENCOUNTER — PHARMACY VISIT (OUTPATIENT)
Dept: PHARMACY | Facility: CLINIC | Age: 50
End: 2025-03-20
Payer: COMMERCIAL

## 2025-03-24 NOTE — H&P
Pain Management H&P    History Of Present Illness  Florence Albarran is a 49 y.o. female presents for procedure state below. Endorses no changes in past medical history or medical health since last seen in clinic.      Past Medical History  She has a past medical history of Acute atopic conjunctivitis, unspecified eye (12/20/2017), Acute candidiasis of vulva and vagina (04/02/2015), Anogenital (venereal) warts (02/24/2016), Anogenital (venereal) warts (01/20/2016), Anogenital (venereal) warts (11/18/2015), Body mass index (BMI) 27.0-27.9, adult (09/19/2018), Calculus of kidney (11/10/2017), Cervicalgia (06/01/2018), Cervicalgia (03/16/2018), Cervicalgia (07/20/2018), Cervicalgia (05/08/2018), Cervicalgia (08/10/2018), Chronic migraine without aura, intractable, with status migrainosus (06/01/2018), Chronic migraine without aura, intractable, with status migrainosus (07/20/2018), Chronic migraine without aura, intractable, with status migrainosus (05/08/2018), Chronic migraine without aura, intractable, with status migrainosus (08/10/2018), 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 (10/05/2018), Encounter for gynecological examination (general) (routine) without abnormal findings (11/10/2017), Encounter for other preprocedural examination (07/29/2019), Encounter for screening for infections with a predominantly sexual mode of transmission (06/01/2018), Encounter for screening for infections with a predominantly sexual mode of transmission (06/01/2018), Encounter for screening for infections with a predominantly sexual mode of transmission (07/20/2018), Encounter for screening for infections with a predominantly sexual mode of transmission (07/20/2018), Encounter for screening for infections with a predominantly sexual mode of transmission (05/08/2018), Encounter for screening for infections with a predominantly sexual mode of transmission (05/08/2018), Encounter for screening for infections with a predominantly  sexual mode of transmission (08/10/2018), Encounter for screening for infections with a predominantly sexual mode of transmission (08/10/2018), Encounter for screening for infections with a predominantly sexual mode of transmission, Encounter for screening for infections with a predominantly sexual mode of transmission (11/10/2017), Encounter for screening for malignant neoplasm of colon (11/05/2017), Encounter for screening for malignant neoplasm of colon (09/26/2018), Encounter for screening mammogram for malignant neoplasm of breast (06/01/2018), Encounter for screening mammogram for malignant neoplasm of breast (07/20/2018), Encounter for screening mammogram for malignant neoplasm of breast (05/08/2018), Encounter for screening mammogram for malignant neoplasm of breast (08/10/2018), Fever, unspecified (09/10/2018), Left upper quadrant pain (06/16/2020), Meibomian gland dysfunction of right eye, unspecified eyelid (08/10/2018), Migraine, unspecified, not intractable, without status migrainosus (08/10/2018), Ocular pain, left eye (09/28/2018), Ocular pain, right eye (09/28/2018), Other specified noninflammatory disorders of vulva and perineum (02/24/2016), Pain disorder with related psychological factors (10/01/2018), Pain in unspecified shoulder (06/04/2021), Personal history of diseases of the skin and subcutaneous tissue (09/18/2017), Personal history of other diseases of the digestive system (03/28/2017), Personal history of other diseases of the respiratory system (09/10/2018), Personal history of other diseases of the respiratory system (09/11/2018), Personal history of other infectious and parasitic diseases (02/26/2016), Personal history of other specified conditions (10/05/2018), Personal history of other specified conditions (03/28/2017), Personal history of other specified conditions (10/23/2017), Personal history of other specified conditions (09/19/2018), Personal history of other specified  conditions (09/03/2019), Personal history of other specified conditions (12/20/2017), Personal history of urinary (tract) infections (05/29/2015), Personal history of urinary (tract) infections (04/20/2017), Personal history of urinary calculi (12/08/2017), Right knee pain, Unspecified condition associated with female genital organs and menstrual cycle (02/24/2016), Urinary tract infection, site not specified (04/16/2019), Urinary tract infection, site not specified (04/16/2021), and Urinary tract infection, site not specified (09/11/2018).    Surgical History  She has a past surgical history that includes Dilation and curettage of uterus (06/15/2015); Laparoscopy diagnostic / biopsy / aspiration / lysis (06/15/2015); MR angio head wo IV contrast (11/17/2019); and MR angio neck wo IV contrast (11/17/2019).     Social History  She reports that she has never smoked. She has never used smokeless tobacco. She reports that she does not currently use alcohol. She reports that she does not use drugs.    Family History  Family History   Problem Relation Name Age of Onset    Lymphoma Mother      No Known Problems Father      Breast cancer Mother's Sister  69    Ovarian cancer Neg Hx          Allergies  Patient has no known allergies.    Review of Symptoms:   Constitutional: Negative for chills, diaphoresis or fever  HENT: Negative for neck swelling  Eyes:.  Negative for eye pain  Respiratory:.  Negative for cough, shortness of breath or wheezing    Cardiovascular:.  Negative for chest pain or palpitations  Gastrointestinal:.  Negative for abdominal pain, nausea and vomiting  Genitourinary:.  Negative for urgency  Musculoskeletal:  Positive for neck pain. Positive for joint pain. Denies falls within the past 3 months.  Skin: Negative for wounds or itching   Neurological: Negative for dizziness, seizures, loss of consciousness and weakness  Endo/Heme/Allergies: Does not bruise/bleed easily  Psychiatric/Behavioral: Negative for  depression. The patient does not appear anxious.      Pre-sedation Evaluation  ASA class 2  Mallampati score 2     PHYSICAL EXAM  Vitals signs reviewed  Constitutional:       General: Not in acute distress     Appearance: Normal appearance. Not ill-appearing.  HENT:     Head: Normocephalic and atraumatic  Eyes:     Conjunctiva/sclera: Conjunctivae normal  Cardiovascular:     Rate and Rhythm: Normal rate and regular rhythm  Pulmonary:     Effort: No respiratory distress  Abdominal:     Palpations: Abdomen is soft  Musculoskeletal: QIU  Skin:     General: Skin is warm and dry  Neurological:     General: No focal deficit present  Psychiatric:         Mood and Affect: Mood normal         Behavior: Behavior normal     Last Recorded Vitals  There were no vitals taken for this visit.    Relevant Results  Current Outpatient Medications   Medication Instructions    aspirin-acetaminophen-caffeine (Excedrin Extra Strength) 250-250-65 mg tablet 2 tablets, Daily    baclofen (LIORESAL) 10 mg, oral, 3 times daily    clobetasol (Temovate) 0.05 % cream     cream base no.171 (COMPOUNDMAX BASE MISC) APPLY 1-2 GRAMS (1-2 PUMPS) TOPICALLY EVERY 6-8 HOURS AS NEEDED FOR PAIN. (BACLOFEN 5%, CYCLOBENZAPRINE 2%, DICLOFENAC 3%, GABAPENTIN 10%, LIDOCAINE 5% IN SALTSTABLE)    dihydroergotamine (Trudhesa) 0.725 mg/pump act. (4 mg/mL) nasal spray 1 spray, Each Nostril, Every 1 hour PRN, Dose may be repeated in 1 hour after the first dose. No more than 2 doses within 24 hours or 3 doses within 7 days.    estradiol (Climara) 0.075 mg/24 hr patch 1 patch, transdermal, Weekly    fremanezumab (Ajovy Autoinjector) 225 mg/1.5 mL auto-injector INJECT 225 MG (1 PEN) UNDER THE SKIN ONCE A MONTH AS DIRECTED.    gabapentin (NEURONTIN) 600 mg, oral, Daily    latanoprost (Xalatan) 0.005 % ophthalmic solution 1 drop, 2 times daily    lidocaine (Lidoderm) 5 % patch 1 patch, transdermal, Daily, Remove & discard patch within 12 hours or as directed by MD.     loteprednol (Lotemax) 0.5 % ophthalmic suspension INSTILL 1 TO 2 DROPS THREE TIMES DAILY TO BOTH EYES.    methocarbamol (Robaxin) 500 mg tablet take 1 to 2 tablets by mouth at bedtime    metoprolol succinate XL (TOPROL-XL) 50 mg, oral, Daily, Do not crush or chew.    Mounjaro 5 mg, Every 7 days    Myrbetriq 50 mg, oral, Daily    omeprazole (PRILOSEC) 20 mg, oral, Daily before breakfast    onabotulinumtoxinA (Botox) 100 unit injection PHYSICIAN TO INJECT 200 UNITS INTRAMUSCULARLY TO HEAD AND NECK EVERY 90 DAYS    ondansetron (Zofran) 8 mg tablet TAKE ONE (1) TABLET BY MOUTH EVERY 8 HOURS AS NEEDED.    plecanatide (Trulance) tablet tablet 1 tablet, Daily    progesterone (PROMETRIUM) 100 mg, oral, Daily, Take at bedtime    rimegepant (Nurtec ODT) 75 mg tablet,disintegrating Allow one (1) tablet to dissolve on or under the tongue once daily as needed for migraine.    rizatriptan MLT (Maxalt-MLT) 10 mg disintegrating tablet allow 1 tablet by mouth to dissolve on or under the tongue at the onset of headache. may repeat every 2 hours as needed. maximum of 3 tablets in 24 hours    SaltStable LS cream 60 g    tiZANidine (ZANAFLEX) 8 mg, oral, As needed    tretinoin (Retin-A) 0.025 % cream     triamcinolone (Kenalog) 0.1 % cream     Ubrelvy 100 mg, oral, Once as needed, MAY REPEAT IN 2 HOURS. MAX DOSE 2 TABLETS IN 24 HOURS.    Xiidra 5 % dropperette 1 drop, 2 times daily         MR cervical spine wo IV contrast 02/01/2025    Narrative  Interpreted By:  Anjel Mock,  STUDY:  MR CERVICAL SPINE WO IV CONTRAST;  2/1/2025 9:34 am    INDICATION:  Signs/Symptoms:c 3 radicular pain failed PT .nsaids. and muscle relax.    ,M54.12 Radiculopathy, cervical region    COMPARISON:  None.    ACCESSION NUMBER(S):  [id number]    ORDERING CLINICIAN:  DEBBIE GARDINER    TECHNIQUE:  Multiplanar, multisequence images of the cervical spine were obtained  without contrast.    FINDINGS:  PARTIALLY VISUALIZED SKULL BASE: No significant  abnormality.    PARASPINAL SOFT TISSUES: No significant paraspinal muscle  abnormality. In the thyroid gland there are multiple nodules, the  largest measuring 1.5 cm in the inferior right lobe. The thyroid  gland also appears mildly heterogeneous overall.    SPINAL CORD: The cervical cord has a normal caliber and signal  intensity. No cord compression.    ALIGNMENT: No spondylolisthesis.    BONE MARROW/VERTEBRAE:  Overall bone marrow signal is within normal  limits. Vertebral body heights are maintained. No acute fracture.    SPINAL CANAL, INTERVERTEBRAL DISCS, AND NEURAL FORAMINA:    Craniocervical junction: No acute abnormality.    C1-C2: The atlantodental joint is intact. The predental space is not  widened.    C2-C3:  No significant disc protrusion, canal stenosis, or foraminal  stenosis.    C3-C4:  No significant disc protrusion, canal stenosis, or foraminal  stenosis.    C4-C5: Minimal central disc protrusion. No significant canal or  neural foraminal stenosis.    C5-C6: Minimal central disc protrusion and tiny focal annular fissure  (axial T2WI 15, series 9). No significant canal or neural foraminal  stenosis.    C6-C7:  No significant disc protrusion, canal stenosis, or foraminal  stenosis.    C7-T1:  No significant disc protrusion, canal stenosis, or foraminal  stenosis.    Impression  1. Minimal central disc protrusions at C4-C5, C5-C6 without any  significant effect on the canal caliber. No significant neural  foraminal stenosis.    2. Heterogeneous thyroid gland with multiple nodules measuring up to  1.5 cm the right lobe. Recommend thyroid function testing and  follow-up nonemergent ultrasound of the thyroid gland for further  characterization. YELLOW ALERT: An incidental finding alert was sent  per protocol.      MACRO:  Incidental Finding:  There are few small hypoattenuating nodules  measuring equal to or greater than 1.5 cm in the thyroid gland.  (**-YCF-**)    Instructions:  Further evaluation  with nonemergent thyroid ultrasound.  (Managing Incidental Thyroid Nodules Detected on Imaging: White Paper  of the ACR Incidental Thyroid Findings Committee. Angie Wood. et  al. Journal of the American College of Radiology,Volume 12, Issue 2,  143 - 150.) [email or web address]    Signed by: Anjel Mock 2/2/2025 1:30 PM  Dictation workstation:   [id number]     No image results found.       No diagnosis found.     ASSESSMENT/PLAN  Florence Albarran is a 49 y.o. female here for right C5/6 transforaminal epidural steroid injection under fluoroscopy    Patient denies any recent antibiotic use or infections, denies any blood thinner use, and denies contrast or local anesthetic allergies     Risks, benefits, alternatives discussed. All questions answered to the best of my ability. Patient agrees to proceed.      Our plan is as follows:  - Proceed with aforementioned procedure          Rustam Kern MD   Pain fellow

## 2025-03-25 ENCOUNTER — HOSPITAL ENCOUNTER (OUTPATIENT)
Facility: HOSPITAL | Age: 50
Discharge: HOME | End: 2025-03-25
Payer: COMMERCIAL

## 2025-03-25 ENCOUNTER — APPOINTMENT (OUTPATIENT)
Dept: PHYSICAL THERAPY | Facility: HOSPITAL | Age: 50
End: 2025-03-25
Payer: COMMERCIAL

## 2025-03-25 VITALS
WEIGHT: 142 LBS | RESPIRATION RATE: 16 BRPM | SYSTOLIC BLOOD PRESSURE: 139 MMHG | BODY MASS INDEX: 21.59 KG/M2 | TEMPERATURE: 97.7 F | OXYGEN SATURATION: 99 % | HEART RATE: 69 BPM | DIASTOLIC BLOOD PRESSURE: 88 MMHG

## 2025-03-25 DIAGNOSIS — G43.719 INTRACTABLE CHRONIC MIGRAINE WITHOUT AURA AND WITHOUT STATUS MIGRAINOSUS: ICD-10-CM

## 2025-03-25 DIAGNOSIS — M54.12 CERVICAL RADICULOPATHY: ICD-10-CM

## 2025-03-25 PROCEDURE — 64479 NJX AA&/STRD TFRM EPI C/T 1: CPT | Performed by: PAIN MEDICINE

## 2025-03-25 PROCEDURE — 99152 MOD SED SAME PHYS/QHP 5/>YRS: CPT | Performed by: PAIN MEDICINE

## 2025-03-25 PROCEDURE — 2500000004 HC RX 250 GENERAL PHARMACY W/ HCPCS (ALT 636 FOR OP/ED): Performed by: PAIN MEDICINE

## 2025-03-25 PROCEDURE — 2550000001 HC RX 255 CONTRASTS: Performed by: PAIN MEDICINE

## 2025-03-25 RX ORDER — FENTANYL CITRATE 50 UG/ML
INJECTION, SOLUTION INTRAMUSCULAR; INTRAVENOUS
Status: COMPLETED | OUTPATIENT
Start: 2025-03-25 | End: 2025-03-25

## 2025-03-25 RX ORDER — LIDOCAINE HYDROCHLORIDE 5 MG/ML
INJECTION, SOLUTION INFILTRATION; INTRAVENOUS
Status: COMPLETED | OUTPATIENT
Start: 2025-03-25 | End: 2025-03-25

## 2025-03-25 RX ORDER — DEXAMETHASONE SODIUM PHOSPHATE 10 MG/ML
INJECTION INTRAMUSCULAR; INTRAVENOUS
Status: COMPLETED | OUTPATIENT
Start: 2025-03-25 | End: 2025-03-25

## 2025-03-25 RX ORDER — MIDAZOLAM HYDROCHLORIDE 1 MG/ML
INJECTION, SOLUTION INTRAMUSCULAR; INTRAVENOUS
Status: COMPLETED | OUTPATIENT
Start: 2025-03-25 | End: 2025-03-25

## 2025-03-25 RX ADMIN — IOHEXOL 1 ML: 240 INJECTION, SOLUTION INTRATHECAL; INTRAVASCULAR; INTRAVENOUS; ORAL at 12:52

## 2025-03-25 RX ADMIN — MIDAZOLAM 1 MG: 1 INJECTION INTRAMUSCULAR; INTRAVENOUS at 12:46

## 2025-03-25 RX ADMIN — LIDOCAINE HYDROCHLORIDE 4 ML: 5 INJECTION, SOLUTION INFILTRATION at 12:52

## 2025-03-25 RX ADMIN — FENTANYL CITRATE 50 MCG: 50 INJECTION INTRAMUSCULAR; INTRAVENOUS at 12:46

## 2025-03-25 RX ADMIN — DEXAMETHASONE SODIUM PHOSPHATE 10 MG: 10 INJECTION, SOLUTION INTRAMUSCULAR; INTRAVENOUS at 12:52

## 2025-03-25 ASSESSMENT — PAIN - FUNCTIONAL ASSESSMENT
PAIN_FUNCTIONAL_ASSESSMENT: FLACC (FACE, LEGS, ACTIVITY, CRY, CONSOLABILITY)
PAIN_FUNCTIONAL_ASSESSMENT: 0-10

## 2025-03-25 ASSESSMENT — PAIN SCALES - GENERAL
PAINLEVEL_OUTOF10: 4
PAINLEVEL_OUTOF10: 4
PAINLEVEL_OUTOF10: 6
PAINLEVEL_OUTOF10: 6

## 2025-03-25 ASSESSMENT — COLUMBIA-SUICIDE SEVERITY RATING SCALE - C-SSRS
2. HAVE YOU ACTUALLY HAD ANY THOUGHTS OF KILLING YOURSELF?: NO
6. HAVE YOU EVER DONE ANYTHING, STARTED TO DO ANYTHING, OR PREPARED TO DO ANYTHING TO END YOUR LIFE?: NO
1. IN THE PAST MONTH, HAVE YOU WISHED YOU WERE DEAD OR WISHED YOU COULD GO TO SLEEP AND NOT WAKE UP?: NO

## 2025-03-25 ASSESSMENT — PAIN DESCRIPTION - DESCRIPTORS: DESCRIPTORS: ACHING;THROBBING;TIGHTNESS

## 2025-03-25 NOTE — DISCHARGE INSTRUCTIONS
DISCHARGE INSTRUCTIONS FOR INJECTIONS     You underwent right cervical transforaminal epidural steroid injection today    After most injections, it is recommended that you relax and limit your activity for the remainder of the day unless you have been told otherwise by your pain physician.  You should not drive a car, operate machinery, or make important legal decisions unless otherwise directed by your pain physician.  You may resume your normal activity, including exercise, tomorrow.      Keep a written pain diary of how much pain relief you experienced following the injection procedure and the length of time of pain relief you experienced pain relief. Following diagnostic injections like medial branch nerve blocks, sacroiliac joint blocks, stellate ganglion injections and other blocks, it is very important you record the specific amount of pain relief you experienced immediately after the injectionand how long it lasted. Your doctor will ask you for this information at your follow up visit.     For all injections, please keep the injection site dry and inspect the site for a couple of days. You may remove the Band-Aid the day of the injection at any time.     Some discomfort, bruising or slight swelling may occur at the injection site. This is not abnormal if it occurs.  If needed you may:    -Take over the counter medication such as Tylenol or Motrin.   -Apply an ice pack for 30 minutes, 2 to 3 times a day for the first 24 hours.     You may shower today; no soaking baths, hot tubs, whirlpools or swimming pools for two days.      If you are given steroids in your injection, it may take 3-5 days for the steroid medication to take effect. You may notice a worsening of your symptoms for 1-2 days after the injection. This is not abnormal.  You may use acetaminophen, ibuprofen, or prescription medication that your doctor may have prescribed for you if you need to do so.     A few common side effects of steroids  include facial flushing, sweating, restlessness, irritability,difficulty sleeping, increase in blood sugar, and increased blood pressure. If you have diabetes, please monitor your blood sugar at least once a day for at least 5 days. If you have poorly controlled high blood pressure, monitoryour blood pressure for at least 2 days and contact your primary care physician if these numbers are unusually high for you.      If you take aspirin or non-steroidal anti-inflammatory drugs (examples are Motrin, Advil, ibuprofen, Naprosyn, Voltaren, Relafen, etc.) you may restart these this evening, but stop taking it 3 days before your next appointment, unless instructed otherwiseby your physician.      You do not need to discontinue non-aspirin-containing pain medications prior to an injection (examples: Celebrex, tramadol, hydrocodone and acetaminophen).      If you take a blood thinning medication (Coumadin, Lovenox, Fragmin,Ticlid, Plavix, Pradaxa, etc.), please discuss this with your primary care physician/cardiologist and your pain physician. These medications MUST be discontinued before you can have an injection safely, without the risk of uncontrolled bleeding. If these medications are not discontinued for an appropriate period of time, you will not be able to receivean injection. Please adhere to instructions given to you about when to restart your blood thinning medication. If you have any questions please reach out to our team.    If you are taking Coumadin, please have your INR checked the morning of your procedure and bring the result to your appointment unless otherwise instructed. If your INR is over 1.2, your injection may need to be rescheduled to avoid uncontrolled bleeding from the needle placement.     Call [facility]  and ask for Pain Management at [phone] between 8am-4pm Monday - Friday if you are experiencing the following:    If you received an epidural or spinal injection:    -Headache that doesnot  go away with medicine, is worse when sitting or standing up, and is greatly relieved upon lying down.   -Severe pain worse than or different than your baseline pain.   -Chills or fever (101º F or greater).   -Drainage or signs of infection at the injection site     Go directly to the Emergency Department if you are experiencing the following and received an epidural or spinal injection:   -Abrupt weakness or progressive weakness in your legs that starts after you leave the clinic.   -Abrupt severe or worsening numbness in your legs.   -Inability to urinate after the injection or loss of bowel or bladder control without the urge to defecate or urinate.     If you have a clinical question that cannot wait until your next appointment, please call [phone] between 8am-4pm Monday - Friday or send a sevenload message. We do our best to return all non-emergency messages within 24 hours, Monday - Friday. A nurse or physician will return your message. You may also try calling and they will do their best to answer your question(s):  - Dr. James Monahan's nurse ([phone])  - Dr. Harrison Ellsworth/Dr. Siddiqi's nurse ([phone])  - Dr. Checo Tucker/Dianne's nurse ([phone])     If you need to cancel an appointment, please call the scheduling staff at [phone] during normal business hours or leave a message at least 24 hours in advance.     If you are going to be sedated for your next procedure, you MUST have responsible adult who can legally drive accompany you home. You cannot eat or drink for at least eight hours prior to the planned procedure if you are going to receive sedation. You may take your non-blood thinning medications with a small sip of water.

## 2025-03-26 PROCEDURE — RXMED WILLOW AMBULATORY MEDICATION CHARGE

## 2025-03-27 ENCOUNTER — PHARMACY VISIT (OUTPATIENT)
Dept: PHARMACY | Facility: CLINIC | Age: 50
End: 2025-03-27
Payer: COMMERCIAL

## 2025-04-01 ENCOUNTER — TREATMENT (OUTPATIENT)
Dept: PHYSICAL THERAPY | Facility: HOSPITAL | Age: 50
End: 2025-04-01
Payer: COMMERCIAL

## 2025-04-01 DIAGNOSIS — G44.209 TENSION-TYPE HEADACHE, NOT INTRACTABLE: ICD-10-CM

## 2025-04-01 DIAGNOSIS — M62.838 NECK MUSCLE SPASM: ICD-10-CM

## 2025-04-01 PROCEDURE — 4200000003 HC PT PACKAGE TREATMENT: Mod: GP | Performed by: PHYSICAL THERAPIST

## 2025-04-01 NOTE — PROGRESS NOTES
Treatment note  Physical Therapy Treatment  Patient Name:Florence Albarran  MRN:[id number]  Today's Date:4/1/2025  Referred by: Shine Tan  Time Calculation  Start Time: 0730  Stop Time: 0750  Time Calculation (min): 20 min    Therapy Diagnosis  1. Neck muscle spasm    2. Tension-type headache, not intractable         Insurance  Visit number:  13 CASH PHASE II  Approved number of visits: CASH  Onset Date: 10/10/2024  Precautions/Fall Risk:none* Pacemaker no  Seizures No  Post Op Movement/Restrictions No    Subjective/Pain: Patient reports she is doing okay. No new complaints.  Current Pain Level (0-10): 3    Objective/Outcome Measures:      Treatment  Phase II Treatment 15 minutes  Trigger point needling was performed to the following muscles R UT. All trigger/tender points were marked and noted. Patient was prepped with 70% Isopropyl alcohol to the site(s). Sterile, single use, solid filament needles were inserted at various depths and angles to release tight tissue, improve microcirculation and remove neuro-noxious chemicals via a myofascial twitch response.     Assessment: Myofascial trigger points revealed in the R UT muscle(s) by dry needle technique with noted needle fibrillation, local twitch response, reproduction of symptoms including but not limited to aching, burning and electricity. Noted are atypical tissue morphology characteristics of abnormal density, palpable margins, contracted/fibrotic muscle and fascial tissue with resistance to penetration. These characteristics reflect abnormal tissue function, innervation and nervous system communication. Electric stimulation performed with dry needling techniques this date.     Plan: Continue PRN

## 2025-04-02 PROCEDURE — RXMED WILLOW AMBULATORY MEDICATION CHARGE

## 2025-04-03 ENCOUNTER — SPECIALTY PHARMACY (OUTPATIENT)
Dept: PHARMACY | Facility: CLINIC | Age: 50
End: 2025-04-03

## 2025-04-03 PROCEDURE — RXMED WILLOW AMBULATORY MEDICATION CHARGE

## 2025-04-04 ENCOUNTER — PHARMACY VISIT (OUTPATIENT)
Dept: PHARMACY | Facility: CLINIC | Age: 50
End: 2025-04-04
Payer: COMMERCIAL

## 2025-04-14 PROCEDURE — RXMED WILLOW AMBULATORY MEDICATION CHARGE

## 2025-04-15 ENCOUNTER — APPOINTMENT (OUTPATIENT)
Dept: PHYSICAL THERAPY | Facility: HOSPITAL | Age: 50
End: 2025-04-15
Payer: COMMERCIAL

## 2025-04-15 DIAGNOSIS — Z30.09 GENERAL COUNSELING AND ADVICE FOR CONTRACEPTIVE MANAGEMENT: Primary | ICD-10-CM

## 2025-04-15 RX ORDER — NORETHINDRONE ACETATE AND ETHINYL ESTRADIOL AND FERROUS FUMARATE 1MG-20(24)
1 KIT ORAL DAILY
Qty: 30 TABLET | Refills: 11 | Status: SHIPPED | OUTPATIENT
Start: 2025-04-15 | End: 2025-04-16 | Stop reason: SDUPTHER

## 2025-04-16 ENCOUNTER — PHARMACY VISIT (OUTPATIENT)
Dept: PHARMACY | Facility: CLINIC | Age: 50
End: 2025-04-16
Payer: COMMERCIAL

## 2025-04-16 DIAGNOSIS — Z30.09 GENERAL COUNSELING AND ADVICE FOR CONTRACEPTIVE MANAGEMENT: ICD-10-CM

## 2025-04-16 PROCEDURE — RXMED WILLOW AMBULATORY MEDICATION CHARGE

## 2025-04-16 RX ORDER — NORETHINDRONE ACETATE AND ETHINYL ESTRADIOL AND FERROUS FUMARATE 1MG-20(24)
1 KIT ORAL DAILY
Qty: 72 TABLET | Refills: 4 | Status: SHIPPED | OUTPATIENT
Start: 2025-04-16 | End: 2025-04-23 | Stop reason: SDUPTHER

## 2025-04-21 ENCOUNTER — SPECIALTY PHARMACY (OUTPATIENT)
Dept: PHARMACY | Facility: CLINIC | Age: 50
End: 2025-04-21

## 2025-04-21 PROCEDURE — RXMED WILLOW AMBULATORY MEDICATION CHARGE

## 2025-04-22 DIAGNOSIS — G43.711 CHRONIC MIGRAINE WITHOUT AURA, INTRACTABLE, WITH STATUS MIGRAINOSUS: ICD-10-CM

## 2025-04-22 RX ORDER — CREAM BASE NO.171
CREAM (GRAM) MISCELLANEOUS
COMMUNITY
Start: 2025-03-24

## 2025-04-22 RX ORDER — TACROLIMUS 0.3 MG/G
OINTMENT TOPICAL
COMMUNITY
Start: 2025-03-06

## 2025-04-22 RX ORDER — RUXOLITINIB 15 MG/G
CREAM TOPICAL
COMMUNITY
Start: 2025-04-04

## 2025-04-23 ENCOUNTER — PHARMACY VISIT (OUTPATIENT)
Dept: PHARMACY | Facility: CLINIC | Age: 50
End: 2025-04-23
Payer: COMMERCIAL

## 2025-04-23 DIAGNOSIS — R11.2 NAUSEA AND VOMITING, UNSPECIFIED VOMITING TYPE: ICD-10-CM

## 2025-04-23 PROCEDURE — RXMED WILLOW AMBULATORY MEDICATION CHARGE

## 2025-04-23 RX ORDER — ONDANSETRON HYDROCHLORIDE 8 MG/1
TABLET, FILM COATED ORAL
Qty: 30 TABLET | Refills: 6 | Status: SHIPPED | OUTPATIENT
Start: 2025-04-23 | End: 2026-04-23

## 2025-04-23 RX ORDER — NORETHINDRONE ACETATE AND ETHINYL ESTRADIOL AND FERROUS FUMARATE 1MG-20(24)
1 KIT ORAL DAILY
Qty: 90 TABLET | Refills: 4 | Status: SHIPPED | OUTPATIENT
Start: 2025-04-23

## 2025-04-23 RX ORDER — FREMANEZUMAB-VFRM 225 MG/1.5ML
INJECTION SUBCUTANEOUS
Qty: 4.5 ML | Refills: 2 | Status: SHIPPED | OUTPATIENT
Start: 2025-04-23 | End: 2026-04-23

## 2025-04-24 PROCEDURE — RXMED WILLOW AMBULATORY MEDICATION CHARGE

## 2025-04-28 ENCOUNTER — PHARMACY VISIT (OUTPATIENT)
Dept: PHARMACY | Facility: CLINIC | Age: 50
End: 2025-04-28
Payer: COMMERCIAL

## 2025-04-28 ENCOUNTER — SPECIALTY PHARMACY (OUTPATIENT)
Dept: PHARMACY | Facility: CLINIC | Age: 50
End: 2025-04-28

## 2025-04-29 ENCOUNTER — APPOINTMENT (OUTPATIENT)
Dept: PHYSICAL THERAPY | Facility: HOSPITAL | Age: 50
End: 2025-04-29
Payer: COMMERCIAL

## 2025-04-29 ENCOUNTER — PHARMACY VISIT (OUTPATIENT)
Dept: PHARMACY | Facility: CLINIC | Age: 50
End: 2025-04-29
Payer: COMMERCIAL

## 2025-04-29 PROCEDURE — RXMED WILLOW AMBULATORY MEDICATION CHARGE

## 2025-04-30 DIAGNOSIS — G43.711 CHRONIC MIGRAINE WITHOUT AURA, INTRACTABLE, WITH STATUS MIGRAINOSUS: ICD-10-CM

## 2025-05-02 ENCOUNTER — PHARMACY VISIT (OUTPATIENT)
Dept: PHARMACY | Facility: CLINIC | Age: 50
End: 2025-05-02
Payer: COMMERCIAL

## 2025-05-05 NOTE — PROGRESS NOTES
Subjective      No chief complaint on file.    HPI  This 48 year old patient presents today with  right knee pain (8/10). The patient states that this right knee pain has been present for years. She completed a Durolane injection to the right knee in September with excellent relief of the right knee pain until about a month ago..  She recently had a cortisone injection on 6/11/2024 and his cortisone injection gave her some relief of the right knee pain.  There has been no recent trauma . The patient rates the knee pain as 8. The patient states that knee pain is worse with and aggravated by activity and bearing weight. The patient admits to right knee instability. The patient has tried ibuprofen and tylenol with no relief. The patient also notes lumbar back pain that has been present for the past several months.  She denies any trauma or injury.  She currently follows with pain management for cervical radiculopathy.  She states that the back pain is worse with and aggravated by prolonged walking and standing.  She denies any saddle anesthesia or loss of bladder or bowel control.  She denies any right-sided sciatica on examination today.    CARDIOLOGY:   Negative for chest pain, shortness of breath.   RESPIRATORY:   Negative for chest pain, shortness of breath.   MUSCULOSKELETAL:   See HPI for details.   NEUROLOGY:   Negative for tingling, numbness, weakness.    Objective    There were no vitals filed for this visit.    Knee Exam  Constitutional: Appears stated age. No apparent distress  Labored Breathing: No  Psychiatric: Normal mood and effect.   Neurological: alert and oriented x3  Skin: intact  MUSCULOSKELETAL: Neck: No tenderness. No pain or limitation with range of motion. Back: Mild tenderness to palpation over the mid line of the lumbar spine.  No tenderness over the right or left SI joint.  No pain with gentle flexion extension of the lumbar spine.  The patient is able to walk on her heels and toes with no  problems.. Straight leg test negative bilaterally. right knee: There is diffuse tenderness over the knee at the medial joint line. full range of motion McMurrays is negative. Anterior drawer and lachmans are negative. There is not an effusion present.  There is laxity with valgus and varus stressing.  The patient walks with a painful gait favoring the right knee while walking.    Standing AP x-rays of both knees and lateral x-rays of the right knee done and read in office today show osteoarthritis of the right knee.     Patient ID: Florence Albarran is a 49 y.o. female.    L Inj/Asp: R knee on 5/6/2025 8:39 AM  Indications: pain  Details: 22 G needle, medial approach  Medications: 40 mg triamcinolone acetonide 40 mg/mL; 1 mL lidocaine 10 mg/mL (1 %)  Outcome: tolerated well, no immediate complications  Procedure, treatment alternatives, risks and benefits explained, specific risks discussed. Immediately prior to procedure a time out was called to verify the correct patient, procedure, equipment, support staff and site/side marked as required. Patient was prepped and draped in the usual sterile fashion.             Diagnoses and all orders for this visit:  Right knee pain, unspecified chronicity (Primary)  -     XR knee right 1-2 views; Future  Primary osteoarthritis of right knee  Chronic knee instability, right  Chronic midline low back pain without sciatica  -     XR lumbar spine 2-3 views; Future  -     Referral to Physical Therapy; Future  Low back strain, initial encounter  -     Referral to Physical Therapy; Future  Degenerative joint disease of cervical and lumbar spine  -     Referral to Physical Therapy; Future  Other orders  -     L Inj/Asp  Options are discussed with the patient in detail.  The patient is given a prescription for physical therapy for her lumbar back pain.  She is also instructed to follow-up with her pain management provider for further evaluation of the lumbar back DJD.  The patient is  instructed regarding activity modification, ice, provider directed at home gentle strengthening and ROM exercises, and the appropriate use of Tylenol as needed for pain with its potential adverse reactions and side effects. The patient understands. The patient states that despite all the treatment listed above that this right knee pain is debilitating and  requests a discussion of further options. Cortisone injection to the right knee is discussed in the office today. This is done in the office today. See procedures below. Return in 4 weeks for consideration of gel injection if the right knee pain persists or sooner as needed, Please note that this report has been produced using speech recognition software.  It may contain errors related to grammar, punctuation or spelling.  Electronically signed, but not reviewed.    Bell Chaney PA-C

## 2025-05-06 ENCOUNTER — APPOINTMENT (OUTPATIENT)
Dept: ORTHOPEDIC SURGERY | Facility: CLINIC | Age: 50
End: 2025-05-06
Payer: COMMERCIAL

## 2025-05-06 ENCOUNTER — HOSPITAL ENCOUNTER (OUTPATIENT)
Dept: RADIOLOGY | Facility: CLINIC | Age: 50
Discharge: HOME | End: 2025-05-06
Payer: COMMERCIAL

## 2025-05-06 DIAGNOSIS — M17.11 PRIMARY OSTEOARTHRITIS OF RIGHT KNEE: ICD-10-CM

## 2025-05-06 DIAGNOSIS — M47.816 DEGENERATIVE JOINT DISEASE OF CERVICAL AND LUMBAR SPINE: ICD-10-CM

## 2025-05-06 DIAGNOSIS — M47.812 DEGENERATIVE JOINT DISEASE OF CERVICAL AND LUMBAR SPINE: ICD-10-CM

## 2025-05-06 DIAGNOSIS — M25.561 RIGHT KNEE PAIN, UNSPECIFIED CHRONICITY: Primary | ICD-10-CM

## 2025-05-06 DIAGNOSIS — M54.50 CHRONIC MIDLINE LOW BACK PAIN WITHOUT SCIATICA: ICD-10-CM

## 2025-05-06 DIAGNOSIS — G89.29 CHRONIC MIDLINE LOW BACK PAIN WITHOUT SCIATICA: ICD-10-CM

## 2025-05-06 DIAGNOSIS — S39.012A LOW BACK STRAIN, INITIAL ENCOUNTER: ICD-10-CM

## 2025-05-06 DIAGNOSIS — M23.51 CHRONIC KNEE INSTABILITY, RIGHT: ICD-10-CM

## 2025-05-06 DIAGNOSIS — M25.561 RIGHT KNEE PAIN, UNSPECIFIED CHRONICITY: ICD-10-CM

## 2025-05-06 PROCEDURE — 72100 X-RAY EXAM L-S SPINE 2/3 VWS: CPT | Performed by: ORTHOPAEDIC SURGERY

## 2025-05-06 PROCEDURE — 20610 DRAIN/INJ JOINT/BURSA W/O US: CPT | Mod: RT | Performed by: PHYSICIAN ASSISTANT

## 2025-05-06 PROCEDURE — 1036F TOBACCO NON-USER: CPT | Performed by: PHYSICIAN ASSISTANT

## 2025-05-06 PROCEDURE — 72100 X-RAY EXAM L-S SPINE 2/3 VWS: CPT

## 2025-05-06 PROCEDURE — 73560 X-RAY EXAM OF KNEE 1 OR 2: CPT | Mod: RT

## 2025-05-06 PROCEDURE — 2500000004 HC RX 250 GENERAL PHARMACY W/ HCPCS (ALT 636 FOR OP/ED): Performed by: PHYSICIAN ASSISTANT

## 2025-05-06 PROCEDURE — 99213 OFFICE O/P EST LOW 20 MIN: CPT | Mod: 25 | Performed by: PHYSICIAN ASSISTANT

## 2025-05-06 PROCEDURE — 99213 OFFICE O/P EST LOW 20 MIN: CPT | Performed by: PHYSICIAN ASSISTANT

## 2025-05-06 RX ORDER — LIDOCAINE HYDROCHLORIDE 10 MG/ML
1 INJECTION, SOLUTION INFILTRATION; PERINEURAL
Status: COMPLETED | OUTPATIENT
Start: 2025-05-06 | End: 2025-05-06

## 2025-05-06 RX ORDER — TRIAMCINOLONE ACETONIDE 40 MG/ML
40 INJECTION, SUSPENSION INTRA-ARTICULAR; INTRAMUSCULAR
Status: COMPLETED | OUTPATIENT
Start: 2025-05-06 | End: 2025-05-06

## 2025-05-06 RX ADMIN — LIDOCAINE HYDROCHLORIDE 1 ML: 10 INJECTION, SOLUTION INFILTRATION; PERINEURAL at 08:39

## 2025-05-06 RX ADMIN — TRIAMCINOLONE ACETONIDE 40 MG: 40 INJECTION, SUSPENSION INTRA-ARTICULAR; INTRAMUSCULAR at 08:39

## 2025-05-06 NOTE — PATIENT INSTRUCTIONS
Thank you for coming to see us today!     We are going to give you a referral for physical therapy   Please call central scheduling to make this appointment.     Please follow up with us in 4 -6 weeks

## 2025-05-12 PROCEDURE — RXMED WILLOW AMBULATORY MEDICATION CHARGE

## 2025-05-13 ENCOUNTER — PHARMACY VISIT (OUTPATIENT)
Dept: PHARMACY | Facility: CLINIC | Age: 50
End: 2025-05-13
Payer: COMMERCIAL

## 2025-05-19 PROCEDURE — RXMED WILLOW AMBULATORY MEDICATION CHARGE

## 2025-05-20 ENCOUNTER — APPOINTMENT (OUTPATIENT)
Dept: PHYSICAL THERAPY | Facility: HOSPITAL | Age: 50
End: 2025-05-20
Payer: COMMERCIAL

## 2025-05-20 PROCEDURE — RXMED WILLOW AMBULATORY MEDICATION CHARGE

## 2025-05-23 ENCOUNTER — APPOINTMENT (OUTPATIENT)
Dept: NEUROLOGY | Facility: HOSPITAL | Age: 50
End: 2025-05-23
Payer: COMMERCIAL

## 2025-05-23 ENCOUNTER — PHARMACY VISIT (OUTPATIENT)
Dept: PHARMACY | Facility: CLINIC | Age: 50
End: 2025-05-23
Payer: COMMERCIAL

## 2025-05-27 PROCEDURE — RXMED WILLOW AMBULATORY MEDICATION CHARGE

## 2025-05-28 ENCOUNTER — SPECIALTY PHARMACY (OUTPATIENT)
Dept: PHARMACY | Facility: CLINIC | Age: 50
End: 2025-05-28

## 2025-05-28 PROCEDURE — RXMED WILLOW AMBULATORY MEDICATION CHARGE

## 2025-05-29 ENCOUNTER — PHARMACY VISIT (OUTPATIENT)
Dept: PHARMACY | Facility: CLINIC | Age: 50
End: 2025-05-29
Payer: COMMERCIAL

## 2025-05-29 ENCOUNTER — APPOINTMENT (OUTPATIENT)
Dept: RADIOLOGY | Facility: HOSPITAL | Age: 50
End: 2025-05-29
Payer: COMMERCIAL

## 2025-05-30 ENCOUNTER — APPOINTMENT (OUTPATIENT)
Dept: RADIOLOGY | Facility: HOSPITAL | Age: 50
End: 2025-05-30
Payer: COMMERCIAL

## 2025-05-30 ENCOUNTER — PROCEDURE VISIT (OUTPATIENT)
Dept: NEUROLOGY | Facility: HOSPITAL | Age: 50
End: 2025-05-30
Payer: COMMERCIAL

## 2025-05-30 VITALS
BODY MASS INDEX: 21.82 KG/M2 | RESPIRATION RATE: 18 BRPM | HEIGHT: 68 IN | HEART RATE: 111 BPM | TEMPERATURE: 97.1 F | DIASTOLIC BLOOD PRESSURE: 88 MMHG | WEIGHT: 144 LBS | SYSTOLIC BLOOD PRESSURE: 140 MMHG

## 2025-05-30 DIAGNOSIS — G43.711 CHRONIC MIGRAINE WITHOUT AURA, INTRACTABLE, WITH STATUS MIGRAINOSUS: Primary | ICD-10-CM

## 2025-05-30 DIAGNOSIS — G43.711 INTRACTABLE CHRONIC MIGRAINE WITHOUT AURA AND WITH STATUS MIGRAINOSUS: ICD-10-CM

## 2025-05-30 PROCEDURE — 64615 CHEMODENERV MUSC MIGRAINE: CPT | Performed by: PSYCHIATRY & NEUROLOGY

## 2025-05-30 PROCEDURE — 2500000004 HC RX 250 GENERAL PHARMACY W/ HCPCS (ALT 636 FOR OP/ED): Mod: JZ | Performed by: PSYCHIATRY & NEUROLOGY

## 2025-05-30 PROCEDURE — 96372 THER/PROPH/DIAG INJ SC/IM: CPT | Performed by: PSYCHIATRY & NEUROLOGY

## 2025-05-30 RX ORDER — KETOROLAC TROMETHAMINE 10 MG/1
10 TABLET, FILM COATED ORAL EVERY 6 HOURS PRN
Qty: 20 TABLET | Refills: 0 | Status: SHIPPED | OUTPATIENT
Start: 2025-05-30 | End: 2025-06-04

## 2025-05-30 RX ORDER — KETOROLAC TROMETHAMINE 30 MG/ML
30 INJECTION, SOLUTION INTRAMUSCULAR; INTRAVENOUS ONCE
Status: COMPLETED | OUTPATIENT
Start: 2025-05-30 | End: 2025-05-30

## 2025-05-30 RX ADMIN — ONABOTULINUMTOXINA 200 UNITS: 100 INJECTION, POWDER, LYOPHILIZED, FOR SOLUTION INTRADERMAL; INTRAMUSCULAR at 16:18

## 2025-05-30 RX ADMIN — KETOROLAC TROMETHAMINE 30 MG: 30 INJECTION, SOLUTION INTRAMUSCULAR; INTRAVENOUS at 16:19

## 2025-05-30 ASSESSMENT — PAIN SCALES - GENERAL: PAINLEVEL_OUTOF10: 0-NO PAIN

## 2025-05-30 NOTE — PROGRESS NOTES
Patient ID: Florence Albarran is a 49 y.o. female.    Head/Face/Jaw Botulinum Injection    Date/Time: 5/30/2025 4:18 PM    Performed by: Scarlet Munoz MD  Authorized by: Scarlet Munoz MD      Consent:      Consent obtained:  Verbal     Consent given by:  Patient    Procedure details:      EMG used?  No     Electrical stimulation used?  No     Diluted by:  Preservative free saline     Medications: 200 Units onabotulinumtoxinA 100 unit      Comments about vials and dilution:  Spp     Total units available:  200       Ad hoc region injected:  Head see diagram with 200 units     Total units injected:  200     Total units wasted:  0    Post-procedure details:      Patient tolerance of procedure:  Tolerated well, no immediate complications    Comments: Please do not rub areas for 24 hours.  No pressure above eyebrows for 24 hours.  Watch out for helmets, headlamps, headbands, goggles, or massage for 24 hours.  If there is discomfort, ice for the first 24 hour,s heat after that.  Headaches may worsen, or you may experience neck stiffness.  If this occurs use your usual headache medication or a mild anti inflammatory such as advil or aleve.  Please call if you have difficulty swallowing.    You received Toradol today for your severe headache.  We are going to continue with 5 day of pills starting tomorrow to break your headache cycle.  Take 1 pill with breakfast lunch dinner and bedtime for 5 days.  Take with food.  Try not to take your triptan or antiinflammatory during this time.  If you have any nausea or diarrhea with the medicine stop and call on the next business day.

## 2025-06-04 ENCOUNTER — APPOINTMENT (OUTPATIENT)
Dept: RADIOLOGY | Facility: HOSPITAL | Age: 50
End: 2025-06-04
Payer: COMMERCIAL

## 2025-06-04 ENCOUNTER — PREP FOR PROCEDURE (OUTPATIENT)
Dept: PAIN MEDICINE | Facility: HOSPITAL | Age: 50
End: 2025-06-04

## 2025-06-04 DIAGNOSIS — M54.12 CERVICAL RADICULOPATHY: ICD-10-CM

## 2025-06-05 PROCEDURE — RXMED WILLOW AMBULATORY MEDICATION CHARGE

## 2025-06-09 ENCOUNTER — PHARMACY VISIT (OUTPATIENT)
Dept: PHARMACY | Facility: CLINIC | Age: 50
End: 2025-06-09
Payer: COMMERCIAL

## 2025-06-09 NOTE — PROGRESS NOTES
Subjective      Chief Complaint   Patient presents with    Right Knee - Pain, Follow-up     HPI  This 48 year old patient presents today with right and left knee pain (8/10). The patient states that this right and left knee pain has been present for years. She completed a Durolane injection to the right knee in September with excellent relief of the right knee pain until about a two months ago.  She recently had a cortisone injection on 5-6-2025 and his cortisone injection gave her no relief of the right knee pain.  There has been no recent trauma . The patient rates the knee pain as 8/10. The patient states that knee pain is worse with and aggravated by activity and bearing weight. The patient admits to right knee instability. The patient has tried ibuprofen and tylenol with no relief. The patient also notes lumbar back pain that has been present for the past several months.  She denies any trauma or injury.  She currently follows with pain management for cervical radiculopathy.  She states that the back pain is worse with and aggravated by prolonged walking and standing.  She denies any saddle anesthesia or loss of bladder or bowel control.  She denies any right-sided sciatica on examination today. She presents today for follow up of right and left knee pain 8/10 and to discuss the possibility of applying for gel injections.    CARDIOLOGY:   Negative for chest pain, shortness of breath.   RESPIRATORY:   Negative for chest pain, shortness of breath.   MUSCULOSKELETAL:   See HPI for details.   NEUROLOGY:   Negative for tingling, numbness, weakness.    Objective    There were no vitals filed for this visit.    Knee Exam  Constitutional: Appears stated age. No apparent distress  Labored Breathing: No  Psychiatric: Normal mood and effect.   Neurological: alert and oriented x3  Skin: intact  MUSCULOSKELETAL: Neck: No tenderness. No pain or limitation with range of motion. Back: Mild tenderness to palpation over the mid  line of the lumbar spine.  No tenderness over the right or left SI joint.  No pain with gentle flexion extension of the lumbar spine.  The patient is able to walk on her heels and toes with no problems.. Straight leg test negative bilaterally. Left knee: There is diffuse tenderness over the knee at the medial joint line. full range of motion McMurrays is negative. There is tenderness over the lateral collateral ligament with palpation. Anterior drawer and lachmans are negative. There is not an effusion present.  There is laxity with valgus and varus stressing.   right knee: There is diffuse tenderness over the knee at the medial joint line. full range of motion McMurrays is negative. Anterior drawer and lachmans are negative. There is not an effusion present.  There is laxity with valgus and varus stressing.  The patient walks with a painful gait favoring the right knee while walking.    Standing AP x-rays of both knees and lateral x-rays of the right knee done and read in office today show osteoarthritis of the right and left knee with osteoarthritis worse at the medial compartment.     Patient ID: Florence Albarran is a 49 y.o. female.    Florence was seen today for pain and follow-up.  Diagnoses and all orders for this visit:  Right knee pain, unspecified chronicity (Primary)  Primary osteoarthritis of right knee  Chronic knee instability, right  Chronic midline low back pain without sciatica  Low back strain, initial encounter  Degenerative joint disease of cervical and lumbar spine    Options are discussed with the patient in detail.  The patient states the left and right knee pain is debilitating and we will apply for viscosupplementation injections for her left and right knee pain in office today.  The patient is instructed regarding activity modification and risk for further injury with falling or trauma, ice, provider directed at home gentle strengthening and ROM exercises, and the appropriate use of Tylenol  as needed for pain with its potential adverse reactions and side effects. The patient understands. Follow up after approval is obtained. Please note that this report has been produced using speech recognition software.  It may contain errors related to grammar, punctuation or spelling.  Electronically signed, but not reviewed.    Bell Chaney PA-C

## 2025-06-10 ENCOUNTER — OFFICE VISIT (OUTPATIENT)
Dept: ORTHOPEDIC SURGERY | Facility: CLINIC | Age: 50
End: 2025-06-10
Payer: COMMERCIAL

## 2025-06-10 VITALS — WEIGHT: 144 LBS | HEIGHT: 68 IN | BODY MASS INDEX: 21.82 KG/M2

## 2025-06-10 DIAGNOSIS — M47.816 DEGENERATIVE JOINT DISEASE OF CERVICAL AND LUMBAR SPINE: ICD-10-CM

## 2025-06-10 DIAGNOSIS — M25.561 RIGHT KNEE PAIN, UNSPECIFIED CHRONICITY: Primary | ICD-10-CM

## 2025-06-10 DIAGNOSIS — M17.12 PRIMARY OSTEOARTHRITIS OF LEFT KNEE: ICD-10-CM

## 2025-06-10 DIAGNOSIS — S39.012A LOW BACK STRAIN, INITIAL ENCOUNTER: ICD-10-CM

## 2025-06-10 DIAGNOSIS — M25.562 LEFT KNEE PAIN, UNSPECIFIED CHRONICITY: ICD-10-CM

## 2025-06-10 DIAGNOSIS — M17.11 PRIMARY OSTEOARTHRITIS OF RIGHT KNEE: ICD-10-CM

## 2025-06-10 DIAGNOSIS — G89.29 CHRONIC MIDLINE LOW BACK PAIN WITHOUT SCIATICA: ICD-10-CM

## 2025-06-10 DIAGNOSIS — M47.812 DEGENERATIVE JOINT DISEASE OF CERVICAL AND LUMBAR SPINE: ICD-10-CM

## 2025-06-10 DIAGNOSIS — M23.51 CHRONIC KNEE INSTABILITY, RIGHT: ICD-10-CM

## 2025-06-10 DIAGNOSIS — M54.50 CHRONIC MIDLINE LOW BACK PAIN WITHOUT SCIATICA: ICD-10-CM

## 2025-06-10 PROCEDURE — 99213 OFFICE O/P EST LOW 20 MIN: CPT | Performed by: PHYSICIAN ASSISTANT

## 2025-06-10 PROCEDURE — 3008F BODY MASS INDEX DOCD: CPT | Performed by: PHYSICIAN ASSISTANT

## 2025-06-10 PROCEDURE — 1036F TOBACCO NON-USER: CPT | Performed by: PHYSICIAN ASSISTANT

## 2025-06-10 ASSESSMENT — PAIN SCALES - GENERAL
PAINLEVEL_OUTOF10: 6
PAINLEVEL_OUTOF10: 6

## 2025-06-10 ASSESSMENT — ENCOUNTER SYMPTOMS
OCCASIONAL FEELINGS OF UNSTEADINESS: 0
LOSS OF SENSATION IN FEET: 0
DEPRESSION: 0

## 2025-06-10 ASSESSMENT — PAIN - FUNCTIONAL ASSESSMENT: PAIN_FUNCTIONAL_ASSESSMENT: 0-10

## 2025-06-10 ASSESSMENT — LIFESTYLE VARIABLES
HOW OFTEN DO YOU HAVE SIX OR MORE DRINKS ON ONE OCCASION: NEVER
HOW OFTEN DO YOU HAVE A DRINK CONTAINING ALCOHOL: NEVER

## 2025-06-10 NOTE — PATIENT INSTRUCTIONS
Thank you for coming to see us today!     Continue rest, ice 20 mins three times a day and elevation  Tylenol OTC as needed for pain control.    We are going to apply for gel injections for the left and right knee today.   We will call you to schedule your appointments once we receive approval from the insurance company.

## 2025-06-11 ENCOUNTER — APPOINTMENT (OUTPATIENT)
Dept: RADIOLOGY | Facility: HOSPITAL | Age: 50
End: 2025-06-11
Payer: COMMERCIAL

## 2025-06-11 DIAGNOSIS — N20.0 KIDNEY STONES: ICD-10-CM

## 2025-06-11 PROCEDURE — 76770 US EXAM ABDO BACK WALL COMP: CPT

## 2025-06-11 PROCEDURE — 76770 US EXAM ABDO BACK WALL COMP: CPT | Performed by: STUDENT IN AN ORGANIZED HEALTH CARE EDUCATION/TRAINING PROGRAM

## 2025-06-16 PROCEDURE — RXMED WILLOW AMBULATORY MEDICATION CHARGE

## 2025-06-19 ENCOUNTER — PHARMACY VISIT (OUTPATIENT)
Dept: PHARMACY | Facility: CLINIC | Age: 50
End: 2025-06-19
Payer: COMMERCIAL

## 2025-06-20 ENCOUNTER — APPOINTMENT (OUTPATIENT)
Dept: UROLOGY | Facility: CLINIC | Age: 50
End: 2025-06-20
Payer: COMMERCIAL

## 2025-06-20 ENCOUNTER — HOSPITAL ENCOUNTER (OUTPATIENT)
Dept: RADIOLOGY | Facility: HOSPITAL | Age: 50
Discharge: HOME | End: 2025-06-20
Payer: COMMERCIAL

## 2025-06-20 DIAGNOSIS — N20.0 KIDNEY STONES: ICD-10-CM

## 2025-06-20 DIAGNOSIS — R10.9 FLANK PAIN: ICD-10-CM

## 2025-06-20 PROCEDURE — 99213 OFFICE O/P EST LOW 20 MIN: CPT | Performed by: UROLOGY

## 2025-06-20 PROCEDURE — 74176 CT ABD & PELVIS W/O CONTRAST: CPT

## 2025-06-20 PROCEDURE — 74176 CT ABD & PELVIS W/O CONTRAST: CPT | Performed by: RADIOLOGY

## 2025-06-20 NOTE — PROGRESS NOTES
Scribed for Dr. Aggie Jonas by Serafin Lane. I, Dr. Aggie Jonas, have personally reviewed and agreed with the information entered by the Virtual Scribe. This visit was completed via telemedicine. All issues as below were discussed and addressed but no physical exam was performed unless allowed by visual confirmation. If it was felt that the patient should be evaluated in clinic, then they were directed there. Patient verbal consented to the visit. 06/20/25    History of Present Illness:  TODAY: (06/20/25)  Florence Albarran is a 49 y.o. female presenting virtually with history of microscopic hematuria s/p negative work-up with me (08/20/24), OAB symptoms (continues on Myrbetriq 75 mg daily), and nephrolithiasis, continues stone surveillance.     Presents virtually for annual follow up and ultrasound results.   Reports she has been doing well overall.   Albeit, c/o acute left flank discomfort intermittently.   Expressed concern of passing a kidney stones.   No gross hematuria, fevers or chills.     Reports some benefit with taking Myrbetriq.   Her daytime urinary frequency has improved.   Albeit, c/o worsening nocturia in the interim.   At times, she has to void [id number] at night.     Renal ultrasound (06/11/25) personally reviewed and independently interpreted.  Minimal left pelvicaliceal dilation, which was not seen on.   No sonographic evidence of nephrolithiasis bilaterally.  Similar appearance of a left lower parapelvic renal cyst   compared to prior CT on 06/14/2024.    Medical History[1]  Surgical History[2]  Family History[3]  Tobacco Use History[4]  Current Medications[5]  Allergies[6]  Past medical, surgical, family and social history in the chart was reviewed and is accurate including any additions to what is in this HPI.    Review of systems:   Pertinent information as listed in the HPI.      Objective   There were no vitals taken for this visit.  Physical Exam:  Exam limited 2/2 being a telehealth visit.      Lab Review:  Lab Results   Component Value Date    WBC 6.5 10/22/2024    RBC 4.41 10/22/2024    HGB 13.9 10/22/2024    HCT 41.5 10/22/2024     10/22/2024      Lab Results   Component Value Date    BUN 13 10/22/2024    CREATININE 0.77 10/22/2024      Lab Results   Component Value Date    HGBA1C 4.9 05/04/2023     Lab Results   Component Value Date    CHOL 156 10/22/2024    TRIG 76 10/22/2024    HDL 46.8 10/22/2024    ALT 8 10/22/2024    AST 9 10/22/2024     10/22/2024    K 4.3 10/22/2024     (H) 10/22/2024    CO2 26 10/22/2024    TSH 1.33 02/04/2025    INR 1.0 09/09/2019    GLUF 83 09/19/2018        ASSESSMENT:  Problem List Items Addressed This Visit    None     PLAN:  #Nephrolithiasis  #Renal colic, left  Her renal ultrasound (06/11/25) was reassuring with no identifiable stone or hydronephrosis, albeit she c/o acute left flank discomfort in the interim. Elects to proceed with CT stone protocol to r/o urinary calculi. Will schedule virtual follow up to review results.     Renal US (06/11/25) personally reviewed and independently interpreted.  No renal, ureteral or bladder stones.  No hydronephrosis bilaterally.     #OAB symptoms  Currently, she is not at treatment goal.   Will reevaluate once her stone-related issues have been fully addressed.   Suspect she may require a urogynecologic referral in the future.     Denotes benefit with continuing Myrbetriq for her OAB symptoms.  However, her symptoms persist, especially at night, and remain bothersome.   Tolerating the medication without side-effects.   Discussed risks of continued use and alternative treatment options.   Patient elects to continue Myrbetriq, Rx refill sent to pharmacy.   Will reevaluate plan on an annual basis.    #Microscopic hematuria  S/p negative work-up with me. (08/20/24)      All questions were answered to the patient’s satisfaction.  Patient agrees with the plan and wishes to proceed.  Continue follow-up for ongoing  care of her chronic medical conditions.    Scribed for Dr. Aggie Jonas by Serafin Lane. I, Dr. Aggie Jonas, have personally reviewed and agreed with the information entered by the Virtual Scribe. 06/20/25         [1]   Past Medical History:  Diagnosis Date    Acute atopic conjunctivitis, unspecified eye 12/20/2017    Allergic conjunctivitis    Acute candidiasis of vulva and vagina 04/02/2015    Vaginal yeast infection    Anogenital (venereal) warts 02/24/2016    Genital warts    Anogenital (venereal) warts 01/20/2016    Genital warts    Anogenital (venereal) warts 11/18/2015    Genital warts    Body mass index (BMI) 27.0-27.9, adult 09/19/2018    BMI 27.0-27.9,adult    Calculus of kidney 11/10/2017    Kidney stone on left side    Cervicalgia 06/01/2018    Cervicalgia of medteams-llxkogn-ztamp region    Cervicalgia 03/16/2018    Cervicalgia of laknkxmr-fjmpoui-ecjhr region    Cervicalgia 07/20/2018    Cervicalgia of dcijziqq-dykmpat-ogskp region    Cervicalgia 05/08/2018    Cervicalgia of stuqxgbo-coevmyd-xhccx region    Cervicalgia 08/10/2018    Cervicalgia of wpnmgpyu-krwguti-fgyjy region    Chronic migraine without aura, intractable, with status migrainosus 06/01/2018    Chronic migraine without aura, with intractable migraine, so stated, with status migrainosus    Chronic migraine without aura, intractable, with status migrainosus 07/20/2018    Chronic migraine without aura, with intractable migraine, so stated, with status migrainosus    Chronic migraine without aura, intractable, with status migrainosus 05/08/2018    Chronic migraine without aura, with intractable migraine, so stated, with status migrainosus    Chronic migraine without aura, intractable, with status migrainosus 08/10/2018    Chronic migraine without aura, with intractable migraine, so stated, with status migrainosus    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 10/05/2018    Vitamin B 12 deficiency    Encounter for gynecological  examination (general) (routine) without abnormal findings 11/10/2017    Well woman exam with routine gynecological exam    Encounter for other preprocedural examination 07/29/2019    Preoperative clearance    Encounter for screening for infections with a predominantly sexual mode of transmission 06/01/2018    Screening for STDs (sexually transmitted diseases)    Encounter for screening for infections with a predominantly sexual mode of transmission 06/01/2018    Routine screening for STI (sexually transmitted infection)    Encounter for screening for infections with a predominantly sexual mode of transmission 07/20/2018    Screening for STDs (sexually transmitted diseases)    Encounter for screening for infections with a predominantly sexual mode of transmission 07/20/2018    Routine screening for STI (sexually transmitted infection)    Encounter for screening for infections with a predominantly sexual mode of transmission 05/08/2018    Screening for STDs (sexually transmitted diseases)    Encounter for screening for infections with a predominantly sexual mode of transmission 05/08/2018    Routine screening for STI (sexually transmitted infection)    Encounter for screening for infections with a predominantly sexual mode of transmission 08/10/2018    Screening for STDs (sexually transmitted diseases)    Encounter for screening for infections with a predominantly sexual mode of transmission 08/10/2018    Routine screening for STI (sexually transmitted infection)    Encounter for screening for infections with a predominantly sexual mode of transmission     Screening for STDs (sexually transmitted diseases)    Encounter for screening for infections with a predominantly sexual mode of transmission 11/10/2017    Routine screening for STI (sexually transmitted infection)    Encounter for screening for malignant neoplasm of colon 11/05/2017    Colon cancer screening    Encounter for screening for malignant neoplasm of  colon 09/26/2018    Colon cancer screening    Encounter for screening mammogram for malignant neoplasm of breast 06/01/2018    Encounter for screening mammogram for breast cancer    Encounter for screening mammogram for malignant neoplasm of breast 07/20/2018    Encounter for screening mammogram for breast cancer    Encounter for screening mammogram for malignant neoplasm of breast 05/08/2018    Encounter for screening mammogram for breast cancer    Encounter for screening mammogram for malignant neoplasm of breast 08/10/2018    Encounter for screening mammogram for breast cancer    Fever, unspecified 09/10/2018    Febrile illness    Left upper quadrant pain 06/16/2020    Colicky LUQ abdominal pain    Meibomian gland dysfunction of right eye, unspecified eyelid 08/10/2018    Meibomian gland dysfunction (MGD) of right eye    Migraine, unspecified, not intractable, without status migrainosus 08/10/2018    Migraine headache    Ocular pain, left eye 09/28/2018    Ocular pain, left eye    Ocular pain, right eye 09/28/2018    Ocular pain, right eye    Other specified noninflammatory disorders of vulva and perineum 02/24/2016    Vulvar lesion    Pain disorder with related psychological factors 10/01/2018    Pain disorder associated with psychological and physical factors    Pain in unspecified shoulder 06/04/2021    Shoulder pain    Personal history of diseases of the skin and subcutaneous tissue 09/18/2017    History of cellulitis    Personal history of other diseases of the digestive system 03/28/2017    History of constipation    Personal history of other diseases of the respiratory system 09/10/2018    History of pharyngitis    Personal history of other diseases of the respiratory system 09/11/2018    History of sinusitis    Personal history of other infectious and parasitic diseases 02/26/2016    History of genital warts    Personal history of other specified conditions 10/05/2018    History of nausea    Personal  history of other specified conditions 03/28/2017    History of epigastric pain    Personal history of other specified conditions 10/23/2017    History of dysuria    Personal history of other specified conditions 09/19/2018    History of epigastric pain    Personal history of other specified conditions 09/03/2019    History of palpitations    Personal history of other specified conditions 12/20/2017    History of palpitations    Personal history of urinary (tract) infections 05/29/2015    History of urinary tract infection    Personal history of urinary (tract) infections 04/20/2017    History of urinary tract infection    Personal history of urinary calculi 12/08/2017    History of renal calculi    Right knee pain     Unspecified condition associated with female genital organs and menstrual cycle 02/24/2016    Genital lesion, female    Urinary tract infection, site not specified 04/16/2019    Acute UTI    Urinary tract infection, site not specified 04/16/2021    Acute UTI    Urinary tract infection, site not specified 09/11/2018    Acute UTI   [2]   Past Surgical History:  Procedure Laterality Date    DILATION AND CURETTAGE OF UTERUS  06/15/2015    Dilation And Curettage Of Cervical Stump    LAPAROSCOPY DIAGNOSTIC / BIOPSY / ASPIRATION / LYSIS  06/15/2015    Exploratory Laparoscopy    MR HEAD ANGIO WO IV CONTRAST  11/17/2019    MR HEAD ANGIO WO IV CONTRAST 11/17/2019 [facility]    MR NECK ANGIO WO IV CONTRAST  11/17/2019    MR NECK ANGIO WO IV CONTRAST 11/17/2019 [facility]   [3]   Family History  Problem Relation Name Age of Onset    Lymphoma Mother      No Known Problems Father      Breast cancer Mother's Sister  69    Ovarian cancer Neg Hx     [4]   Social History  Tobacco Use   Smoking Status Never   Smokeless Tobacco Never   [5]   Current Outpatient Medications   Medication Sig Dispense Refill    aspirin-acetaminophen-caffeine (Excedrin Extra Strength) 250-250-65 mg tablet Take 2 tablets by  mouth once daily.      baclofen (Lioresal) 10 mg tablet Take 1 tablet (10 mg) by mouth 3 times a day. 270 tablet 3    clobetasol (Temovate) 0.05 % cream       CompoundMax Base cream [Apply 1 to 2 grams (1 to 2 Pumps) topically to the affected area every 6 to 8 hours as needed for pain. (Baclofen 5%, Cyclobenzaprine 2%, Lidocaine 5%)      cream base no.171 (COMPOUNDMAX BASE MISC) APPLY 1-2 GRAMS (1-2 PUMPS) TOPICALLY EVERY 6-8 HOURS AS NEEDED FOR PAIN. (BACLOFEN 5%, CYCLOBENZAPRINE 2%, DICLOFENAC 3%, GABAPENTIN 10%, LIDOCAINE 5% IN SALTSTABLE)      dihydroergotamine (Trudhesa) 0.725 mg/pump act. (4 mg/mL) nasal spray Administer 1 spray into each nostril every 1 hour if needed for migraine. Dose may be repeated in 1 hour after the first dose. No more than 2 doses within 24 hours or 3 doses within 7 days. 8 each 6    fremanezumab (Ajovy Autoinjector) 225 mg/1.5 mL auto-injector INJECT 225 MG (1 PEN) UNDER THE SKIN ONCE A MONTH AS DIRECTED. 4.5 mL 2    gabapentin (Neurontin) 600 mg tablet Take 1 tablet (600 mg) by mouth once daily. 90 tablet 3    latanoprost (Xalatan) 0.005 % ophthalmic solution Administer 1 drop into affected eye(s) twice a day.      lidocaine (Lidoderm) 5 % patch Place 1 patch over 12 hours on the skin once daily. Remove & discard patch within 12 hours or as directed by MD. 30 patch 11    loteprednol (Lotemax) 0.5 % ophthalmic suspension INSTILL 1 TO 2 DROPS THREE TIMES DAILY TO BOTH EYES. 5 mL 6    methocarbamol (Robaxin) 500 mg tablet       metoprolol succinate XL (Toprol-XL) 50 mg 24 hr tablet Take 1 tablet (50 mg) by mouth once daily. Do not crush or chew. 90 tablet 3    mirabegron (Myrbetriq) 50 mg tablet extended release 24 hr 24 hr tablet Take 1 tablet (50 mg) by mouth once daily. 30 tablet 11    norethindrone-e.estradioL-iron (Finzala) 1 mg-20 mcg(24) /75 mg (4) tablet,chewable Chew 1 tablet once daily. Please take it continuously, skipping the placebo pills 90 tablet 4    omeprazole  (PriLOSEC) 20 mg DR capsule Take 1 capsule (20 mg) by mouth once daily in the morning. Take before meals. 90 capsule 3    onabotulinumtoxinA (Botox) 100 unit injection PHYSICIAN TO INJECT 200 UNITS INTRAMUSCULARLY TO HEAD AND NECK EVERY 90 DAYS 2 each 3    ondansetron (Zofran) 8 mg tablet TAKE ONE (1) TABLET BY MOUTH EVERY 8 HOURS AS NEEDED. 30 tablet 6    Opzelura 1.5 % cream       plecanatide (Trulance) tablet tablet Take 1 tablet (3 mg) by mouth once daily.      rimegepant (Nurtec ODT) 75 mg tablet,disintegrating Allow one (1) tablet to dissolve on or under the tongue once daily as needed for migraine. 8 tablet 11    rizatriptan MLT (Maxalt-MLT) 10 mg disintegrating tablet allow 1 tablet by mouth to dissolve on or under the tongue at the onset of headache. may repeat every 2 hours as needed. maximum of 3 tablets in 24 hours 12 tablet 11    SaltStable LS cream 60 g.      tacrolimus (Protopic) 0.03 % ointment apply topically to the affected area twice daily      tirzepatide (Mounjaro) 5 mg/0.5 mL pen injector Inject 5 mg under the skin every 7 days.      tiZANidine (Zanaflex) 4 mg capsule Take 2 capsules (8 mg) by mouth if needed for muscle spasms. 60 capsule 6    tretinoin (Retin-A) 0.025 % cream       triamcinolone (Kenalog) 0.1 % cream       ubrogepant (Ubrelvy) 100 mg tablet Take 1 tablet (100 mg) by mouth 1 time if needed (migraine). MAY REPEAT IN 2 HOURS. MAX DOSE 2 TABLETS IN 24 HOURS. 10 tablet 6    Xiidra 5 % dropperette Administer 1 drop into both eyes 2 times a day.       No current facility-administered medications for this visit.   [6] No Known Allergies

## 2025-06-23 ENCOUNTER — PATIENT MESSAGE (OUTPATIENT)
Dept: PRIMARY CARE | Facility: CLINIC | Age: 50
End: 2025-06-23
Payer: COMMERCIAL

## 2025-06-23 DIAGNOSIS — E66.3 OVERWEIGHT WITH BODY MASS INDEX (BMI) 25.0-29.9: Primary | ICD-10-CM

## 2025-06-23 DIAGNOSIS — K58.1 IRRITABLE BOWEL SYNDROME WITH CONSTIPATION: Primary | ICD-10-CM

## 2025-06-23 PROCEDURE — RXMED WILLOW AMBULATORY MEDICATION CHARGE

## 2025-06-23 RX ORDER — TENAPANOR HYDROCHLORIDE 53.2 MG/1
50 TABLET ORAL 2 TIMES DAILY
Qty: 60 TABLET | Refills: 1 | Status: SHIPPED | OUTPATIENT
Start: 2025-06-23

## 2025-06-24 ENCOUNTER — PHARMACY VISIT (OUTPATIENT)
Dept: PHARMACY | Facility: CLINIC | Age: 50
End: 2025-06-24
Payer: COMMERCIAL

## 2025-06-24 ENCOUNTER — APPOINTMENT (OUTPATIENT)
Dept: UROLOGY | Facility: CLINIC | Age: 50
End: 2025-06-24
Payer: COMMERCIAL

## 2025-06-24 DIAGNOSIS — N20.0 NEPHROLITHIASIS: Primary | ICD-10-CM

## 2025-06-24 DIAGNOSIS — R35.0 URINARY FREQUENCY: ICD-10-CM

## 2025-06-24 PROCEDURE — 99214 OFFICE O/P EST MOD 30 MIN: CPT | Performed by: UROLOGY

## 2025-06-24 PROCEDURE — RXMED WILLOW AMBULATORY MEDICATION CHARGE

## 2025-06-24 RX ORDER — TIRZEPATIDE 7.5 MG/.5ML
7.5 INJECTION, SOLUTION SUBCUTANEOUS
Qty: 6 ML | Refills: 1 | Status: SHIPPED | OUTPATIENT
Start: 2025-06-24

## 2025-06-24 RX ORDER — MIRABEGRON 50 MG/1
50 TABLET, FILM COATED, EXTENDED RELEASE ORAL DAILY
Qty: 30 TABLET | Refills: 11 | Status: SHIPPED | OUTPATIENT
Start: 2025-06-24 | End: 2026-06-24

## 2025-06-24 NOTE — PROGRESS NOTES
Subjective     This visit was completed via telemedicine. All issues as below were discussed and addressed but no physical exam was performed unless allowed by visual confirmation. If it was felt that the patient should be evaluated in clinic, then they were directed there. Patient verbally consented to visit.      Florence Albarran is a 49 y.o. female with history of microscopic hematuria s/p negative work-up with me (08/20/24), OAB symptoms (continues on Myrbetriq 75 mg daily), and nephrolithiasis. During her last visit patient had complaints of acute left flank discomfort. She presents today for a follow up visit to review CT A&P. Patient reports her pain has resolved.  Denies any recent gross hematuria, fevers, chills, urinary retention, nausea or vomiting.            Medical History[1]  Surgical History[2]  Family History[3]  Current Medications[4]  Allergies[5]  Social History     Socioeconomic History    Marital status:      Spouse name: Not on file    Number of children: Not on file    Years of education: Not on file    Highest education level: Not on file   Occupational History    Not on file   Tobacco Use    Smoking status: Never    Smokeless tobacco: Never   Vaping Use    Vaping status: Never Used   Substance and Sexual Activity    Alcohol use: Not Currently    Drug use: Never    Sexual activity: Defer   Other Topics Concern    Not on file   Social History Narrative    Not on file     Social Drivers of Health     Financial Resource Strain: Not on file   Food Insecurity: Not on file   Transportation Needs: Not on file   Physical Activity: Not on file   Stress: Not on file   Social Connections: Not on file   Intimate Partner Violence: Not on file   Housing Stability: Not on file       Review of Systems  Pertinent items are noted in HPI.    Objective       Lab Review  Lab Results   Component Value Date    WBC 6.5 10/22/2024    RBC 4.41 10/22/2024    HGB 13.9 10/22/2024    HCT 41.5 10/22/2024    PLT  240 10/22/2024      Lab Results   Component Value Date    BUN 13 10/22/2024    CREATININE 0.77 10/22/2024              Assessment/Plan   There are no diagnoses linked to this encounter.    History of nephrolithiasis   Microscopic hematuria s/p negative work-up with me. (08/20/24)  OAB - on Myrbetriq doing well, will refill    I personally and independently reviewed images of the CT A&P from 6/20/2025 which showed few punctate scattered nonobstructing left renal calculi with no  evidence of hydroureteronephrosis.    We discussed that most calculi under 5 mm can be safely observed. This recommendation is based on large series looking at spontaneous passage rates that suggest that the likelihood of stone passage is highest for stones under 4 mm in size (approximately 70-80%), moderate for stones between 4 and 6 mm (50%), and lowest for stones 6 mm or greater (20%-30%).      However, stones under 5 mm that are in a solitary kidney, associated with infection or causing significant obstruction should be removed to prevent loss of renal function and/or sepsis. Also, every patient has different anatomy and different ability to pass calculi and tolerate pain, so intervention is also recommended in patients with small stones that are in significant discomfort or that have a history of being unable to pass small calculi. Patient understands and desires to proceed.      We will follow up annually with renal US to monitor stone presence and formation.       All questions were answered to the patient's satisfaction. Patient agrees with the plan and wishes to proceed. Follow-up will be scheduled appropriately.       Scribed for Dr. Jonas by Shanique Mayes. I , Dr Jonas, have personally reviewed and agreed with the information entered by the Virtual Scribe.          [1]   Past Medical History:  Diagnosis Date    Acute atopic conjunctivitis, unspecified eye 12/20/2017    Allergic conjunctivitis    Acute candidiasis of vulva and vagina  04/02/2015    Vaginal yeast infection    Anogenital (venereal) warts 02/24/2016    Genital warts    Anogenital (venereal) warts 01/20/2016    Genital warts    Anogenital (venereal) warts 11/18/2015    Genital warts    Body mass index (BMI) 27.0-27.9, adult 09/19/2018    BMI 27.0-27.9,adult    Calculus of kidney 11/10/2017    Kidney stone on left side    Cervicalgia 06/01/2018    Cervicalgia of pqlzvbif-qvmomjx-vmcqb region    Cervicalgia 03/16/2018    Cervicalgia of nabwewcc-mvnzqsp-lsxcg region    Cervicalgia 07/20/2018    Cervicalgia of wwgegadd-xlpczkb-spgwu region    Cervicalgia 05/08/2018    Cervicalgia of gpijfpuh-whksmoo-dctxs region    Cervicalgia 08/10/2018    Cervicalgia of aglvmpkr-pjkbpea-utvst region    Chronic migraine without aura, intractable, with status migrainosus 06/01/2018    Chronic migraine without aura, with intractable migraine, so stated, with status migrainosus    Chronic migraine without aura, intractable, with status migrainosus 07/20/2018    Chronic migraine without aura, with intractable migraine, so stated, with status migrainosus    Chronic migraine without aura, intractable, with status migrainosus 05/08/2018    Chronic migraine without aura, with intractable migraine, so stated, with status migrainosus    Chronic migraine without aura, intractable, with status migrainosus 08/10/2018    Chronic migraine without aura, with intractable migraine, so stated, with status migrainosus    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 10/05/2018    Vitamin B 12 deficiency    Encounter for gynecological examination (general) (routine) without abnormal findings 11/10/2017    Well woman exam with routine gynecological exam    Encounter for other preprocedural examination 07/29/2019    Preoperative clearance    Encounter for screening for infections with a predominantly sexual mode of transmission 06/01/2018    Screening for STDs (sexually transmitted diseases)    Encounter for screening for  infections with a predominantly sexual mode of transmission 06/01/2018    Routine screening for STI (sexually transmitted infection)    Encounter for screening for infections with a predominantly sexual mode of transmission 07/20/2018    Screening for STDs (sexually transmitted diseases)    Encounter for screening for infections with a predominantly sexual mode of transmission 07/20/2018    Routine screening for STI (sexually transmitted infection)    Encounter for screening for infections with a predominantly sexual mode of transmission 05/08/2018    Screening for STDs (sexually transmitted diseases)    Encounter for screening for infections with a predominantly sexual mode of transmission 05/08/2018    Routine screening for STI (sexually transmitted infection)    Encounter for screening for infections with a predominantly sexual mode of transmission 08/10/2018    Screening for STDs (sexually transmitted diseases)    Encounter for screening for infections with a predominantly sexual mode of transmission 08/10/2018    Routine screening for STI (sexually transmitted infection)    Encounter for screening for infections with a predominantly sexual mode of transmission     Screening for STDs (sexually transmitted diseases)    Encounter for screening for infections with a predominantly sexual mode of transmission 11/10/2017    Routine screening for STI (sexually transmitted infection)    Encounter for screening for malignant neoplasm of colon 11/05/2017    Colon cancer screening    Encounter for screening for malignant neoplasm of colon 09/26/2018    Colon cancer screening    Encounter for screening mammogram for malignant neoplasm of breast 06/01/2018    Encounter for screening mammogram for breast cancer    Encounter for screening mammogram for malignant neoplasm of breast 07/20/2018    Encounter for screening mammogram for breast cancer    Encounter for screening mammogram for malignant neoplasm of breast 05/08/2018     Encounter for screening mammogram for breast cancer    Encounter for screening mammogram for malignant neoplasm of breast 08/10/2018    Encounter for screening mammogram for breast cancer    Fever, unspecified 09/10/2018    Febrile illness    Left upper quadrant pain 06/16/2020    Colicky LUQ abdominal pain    Meibomian gland dysfunction of right eye, unspecified eyelid 08/10/2018    Meibomian gland dysfunction (MGD) of right eye    Migraine, unspecified, not intractable, without status migrainosus 08/10/2018    Migraine headache    Ocular pain, left eye 09/28/2018    Ocular pain, left eye    Ocular pain, right eye 09/28/2018    Ocular pain, right eye    Other specified noninflammatory disorders of vulva and perineum 02/24/2016    Vulvar lesion    Pain disorder with related psychological factors 10/01/2018    Pain disorder associated with psychological and physical factors    Pain in unspecified shoulder 06/04/2021    Shoulder pain    Personal history of diseases of the skin and subcutaneous tissue 09/18/2017    History of cellulitis    Personal history of other diseases of the digestive system 03/28/2017    History of constipation    Personal history of other diseases of the respiratory system 09/10/2018    History of pharyngitis    Personal history of other diseases of the respiratory system 09/11/2018    History of sinusitis    Personal history of other infectious and parasitic diseases 02/26/2016    History of genital warts    Personal history of other specified conditions 10/05/2018    History of nausea    Personal history of other specified conditions 03/28/2017    History of epigastric pain    Personal history of other specified conditions 10/23/2017    History of dysuria    Personal history of other specified conditions 09/19/2018    History of epigastric pain    Personal history of other specified conditions 09/03/2019    History of palpitations    Personal history of other specified conditions  12/20/2017    History of palpitations    Personal history of urinary (tract) infections 05/29/2015    History of urinary tract infection    Personal history of urinary (tract) infections 04/20/2017    History of urinary tract infection    Personal history of urinary calculi 12/08/2017    History of renal calculi    Right knee pain     Unspecified condition associated with female genital organs and menstrual cycle 02/24/2016    Genital lesion, female    Urinary tract infection, site not specified 04/16/2019    Acute UTI    Urinary tract infection, site not specified 04/16/2021    Acute UTI    Urinary tract infection, site not specified 09/11/2018    Acute UTI   [2]   Past Surgical History:  Procedure Laterality Date    DILATION AND CURETTAGE OF UTERUS  06/15/2015    Dilation And Curettage Of Cervical Stump    LAPAROSCOPY DIAGNOSTIC / BIOPSY / ASPIRATION / LYSIS  06/15/2015    Exploratory Laparoscopy    MR HEAD ANGIO WO IV CONTRAST  11/17/2019    MR HEAD ANGIO WO IV CONTRAST 11/17/2019 [facility]    MR NECK ANGIO WO IV CONTRAST  11/17/2019    MR NECK ANGIO WO IV CONTRAST 11/17/2019 [facility]   [3]   Family History  Problem Relation Name Age of Onset    Lymphoma Mother      No Known Problems Father      Breast cancer Mother's Sister  69    Ovarian cancer Neg Hx     [4]   Current Outpatient Medications   Medication Sig Dispense Refill    aspirin-acetaminophen-caffeine (Excedrin Extra Strength) 250-250-65 mg tablet Take 2 tablets by mouth once daily.      baclofen (Lioresal) 10 mg tablet Take 1 tablet (10 mg) by mouth 3 times a day. 270 tablet 3    clobetasol (Temovate) 0.05 % cream       CompoundMax Base cream [Apply 1 to 2 grams (1 to 2 Pumps) topically to the affected area every 6 to 8 hours as needed for pain. (Baclofen 5%, Cyclobenzaprine 2%, Lidocaine 5%)      cream base no.171 (COMPOUNDMAX BASE MISC) APPLY 1-2 GRAMS (1-2 PUMPS) TOPICALLY EVERY 6-8 HOURS AS NEEDED FOR PAIN. (BACLOFEN 5%,  CYCLOBENZAPRINE 2%, DICLOFENAC 3%, GABAPENTIN 10%, LIDOCAINE 5% IN SALTSTABLE)      dihydroergotamine (Trudhesa) 0.725 mg/pump act. (4 mg/mL) nasal spray Administer 1 spray into each nostril every 1 hour if needed for migraine. Dose may be repeated in 1 hour after the first dose. No more than 2 doses within 24 hours or 3 doses within 7 days. 8 each 6    fremanezumab (Ajovy Autoinjector) 225 mg/1.5 mL auto-injector INJECT 225 MG (1 PEN) UNDER THE SKIN ONCE A MONTH AS DIRECTED. 4.5 mL 2    gabapentin (Neurontin) 600 mg tablet Take 1 tablet (600 mg) by mouth once daily. 90 tablet 3    latanoprost (Xalatan) 0.005 % ophthalmic solution Administer 1 drop into affected eye(s) twice a day.      lidocaine (Lidoderm) 5 % patch Place 1 patch over 12 hours on the skin once daily. Remove & discard patch within 12 hours or as directed by MD. 30 patch 11    loteprednol (Lotemax) 0.5 % ophthalmic suspension INSTILL 1 TO 2 DROPS THREE TIMES DAILY TO BOTH EYES. 5 mL 6    methocarbamol (Robaxin) 500 mg tablet       metoprolol succinate XL (Toprol-XL) 50 mg 24 hr tablet Take 1 tablet (50 mg) by mouth once daily. Do not crush or chew. 90 tablet 3    mirabegron (Myrbetriq) 50 mg tablet extended release 24 hr 24 hr tablet Take 1 tablet (50 mg) by mouth once daily. 30 tablet 11    norethindrone-e.estradioL-iron (Finzala) 1 mg-20 mcg(24) /75 mg (4) tablet,chewable Chew 1 tablet once daily. Please take it continuously, skipping the placebo pills 90 tablet 4    omeprazole (PriLOSEC) 20 mg DR capsule Take 1 capsule (20 mg) by mouth once daily in the morning. Take before meals. 90 capsule 3    onabotulinumtoxinA (Botox) 100 unit injection PHYSICIAN TO INJECT 200 UNITS INTRAMUSCULARLY TO HEAD AND NECK EVERY 90 DAYS 2 each 3    ondansetron (Zofran) 8 mg tablet TAKE ONE (1) TABLET BY MOUTH EVERY 8 HOURS AS NEEDED. 30 tablet 6    Opzelura 1.5 % cream       plecanatide (Trulance) tablet tablet Take 1 tablet (3 mg) by mouth once daily.       rimegepant (Nurtec ODT) 75 mg tablet,disintegrating Allow one (1) tablet to dissolve on or under the tongue once daily as needed for migraine. 8 tablet 11    rizatriptan MLT (Maxalt-MLT) 10 mg disintegrating tablet allow 1 tablet by mouth to dissolve on or under the tongue at the onset of headache. may repeat every 2 hours as needed. maximum of 3 tablets in 24 hours 12 tablet 11    SaltStable LS cream 60 g.      tacrolimus (Protopic) 0.03 % ointment apply topically to the affected area twice daily      tenapanor (Ibsrela) 50 mg tablet tablet Take 1 tablet (50 mg) by mouth 2 times a day. 60 tablet 1    tirzepatide (Mounjaro) 5 mg/0.5 mL pen injector Inject 5 mg under the skin every 7 days.      tiZANidine (Zanaflex) 4 mg capsule Take 2 capsules (8 mg) by mouth if needed for muscle spasms. 60 capsule 6    tretinoin (Retin-A) 0.025 % cream       triamcinolone (Kenalog) 0.1 % cream       ubrogepant (Ubrelvy) 100 mg tablet Take 1 tablet (100 mg) by mouth 1 time if needed (migraine). MAY REPEAT IN 2 HOURS. MAX DOSE 2 TABLETS IN 24 HOURS. 10 tablet 6    Xiidra 5 % dropperette Administer 1 drop into both eyes 2 times a day.       No current facility-administered medications for this visit.   [5] No Known Allergies

## 2025-06-25 ENCOUNTER — SPECIALTY PHARMACY (OUTPATIENT)
Dept: PHARMACY | Facility: CLINIC | Age: 50
End: 2025-06-25

## 2025-06-26 ENCOUNTER — PHARMACY VISIT (OUTPATIENT)
Dept: PHARMACY | Facility: CLINIC | Age: 50
End: 2025-06-26
Payer: COMMERCIAL

## 2025-07-01 PROCEDURE — RXMED WILLOW AMBULATORY MEDICATION CHARGE

## 2025-07-02 ENCOUNTER — PHARMACY VISIT (OUTPATIENT)
Dept: PHARMACY | Facility: CLINIC | Age: 50
End: 2025-07-02
Payer: COMMERCIAL

## 2025-07-02 ENCOUNTER — SPECIALTY PHARMACY (OUTPATIENT)
Dept: PHARMACY | Facility: CLINIC | Age: 50
End: 2025-07-02

## 2025-07-02 DIAGNOSIS — R11.2 NAUSEA AND VOMITING, UNSPECIFIED VOMITING TYPE: ICD-10-CM

## 2025-07-02 PROCEDURE — RXMED WILLOW AMBULATORY MEDICATION CHARGE

## 2025-07-02 RX ORDER — ONDANSETRON 8 MG/1
TABLET, FILM COATED ORAL
Qty: 30 TABLET | Refills: 6 | Status: SHIPPED | OUTPATIENT
Start: 2025-07-02 | End: 2026-07-02

## 2025-07-03 ENCOUNTER — PHARMACY VISIT (OUTPATIENT)
Dept: PHARMACY | Facility: CLINIC | Age: 50
End: 2025-07-03
Payer: COMMERCIAL

## 2025-07-07 ENCOUNTER — PHARMACY VISIT (OUTPATIENT)
Dept: PHARMACY | Facility: CLINIC | Age: 50
End: 2025-07-07

## 2025-07-08 ENCOUNTER — HOSPITAL ENCOUNTER (OUTPATIENT)
Facility: HOSPITAL | Age: 50
Discharge: HOME | End: 2025-07-08
Payer: COMMERCIAL

## 2025-07-08 VITALS
HEART RATE: 77 BPM | DIASTOLIC BLOOD PRESSURE: 90 MMHG | OXYGEN SATURATION: 100 % | SYSTOLIC BLOOD PRESSURE: 131 MMHG | BODY MASS INDEX: 21.98 KG/M2 | WEIGHT: 145 LBS | HEIGHT: 68 IN | RESPIRATION RATE: 16 BRPM | TEMPERATURE: 98.2 F

## 2025-07-08 DIAGNOSIS — M54.12 CERVICAL RADICULOPATHY: ICD-10-CM

## 2025-07-08 PROCEDURE — 64479 NJX AA&/STRD TFRM EPI C/T 1: CPT | Performed by: PAIN MEDICINE

## 2025-07-08 PROCEDURE — 2500000004 HC RX 250 GENERAL PHARMACY W/ HCPCS (ALT 636 FOR OP/ED): Performed by: PAIN MEDICINE

## 2025-07-08 PROCEDURE — 3700000012 HC SEDATION LEVEL 5+ TIME - INITIAL 15 MINUTES 5/> YEARS

## 2025-07-08 PROCEDURE — 2550000001 HC RX 255 CONTRASTS: Performed by: PAIN MEDICINE

## 2025-07-08 PROCEDURE — 7100000010 HC PHASE TWO TIME - EACH INCREMENTAL 1 MINUTE

## 2025-07-08 PROCEDURE — 7100000009 HC PHASE TWO TIME - INITIAL BASE CHARGE

## 2025-07-08 RX ORDER — MIDAZOLAM HYDROCHLORIDE 1 MG/ML
INJECTION, SOLUTION INTRAMUSCULAR; INTRAVENOUS
Status: COMPLETED | OUTPATIENT
Start: 2025-07-08 | End: 2025-07-08

## 2025-07-08 RX ORDER — FENTANYL CITRATE 50 UG/ML
INJECTION, SOLUTION INTRAMUSCULAR; INTRAVENOUS
Status: COMPLETED | OUTPATIENT
Start: 2025-07-08 | End: 2025-07-08

## 2025-07-08 RX ORDER — DEXAMETHASONE SODIUM PHOSPHATE 10 MG/ML
INJECTION INTRAMUSCULAR; INTRAVENOUS
Status: COMPLETED | OUTPATIENT
Start: 2025-07-08 | End: 2025-07-08

## 2025-07-08 RX ORDER — LIDOCAINE HYDROCHLORIDE 5 MG/ML
INJECTION, SOLUTION INFILTRATION; INTRAVENOUS
Status: COMPLETED | OUTPATIENT
Start: 2025-07-08 | End: 2025-07-08

## 2025-07-08 RX ADMIN — IOHEXOL 1 ML: 240 INJECTION, SOLUTION INTRATHECAL; INTRAVASCULAR; INTRAVENOUS; ORAL at 08:57

## 2025-07-08 RX ADMIN — DEXAMETHASONE SODIUM PHOSPHATE 10 MG: 10 INJECTION, SOLUTION INTRAMUSCULAR; INTRAVENOUS at 08:58

## 2025-07-08 RX ADMIN — MIDAZOLAM 1 MG: 1 INJECTION INTRAMUSCULAR; INTRAVENOUS at 08:48

## 2025-07-08 RX ADMIN — FENTANYL CITRATE 50 MCG: 50 INJECTION, SOLUTION INTRAMUSCULAR; INTRAVENOUS at 08:48

## 2025-07-08 RX ADMIN — LIDOCAINE HYDROCHLORIDE 3 ML: 5 INJECTION, SOLUTION INFILTRATION at 08:57

## 2025-07-08 ASSESSMENT — PAIN - FUNCTIONAL ASSESSMENT
PAIN_FUNCTIONAL_ASSESSMENT: WONG-BAKER FACES
PAIN_FUNCTIONAL_ASSESSMENT: 0-10
PAIN_FUNCTIONAL_ASSESSMENT: WONG-BAKER FACES
PAIN_FUNCTIONAL_ASSESSMENT: 0-10
PAIN_FUNCTIONAL_ASSESSMENT: WONG-BAKER FACES
PAIN_FUNCTIONAL_ASSESSMENT: 0-10

## 2025-07-08 ASSESSMENT — PAIN SCALES - GENERAL
PAINLEVEL_OUTOF10: 6
PAINLEVEL_OUTOF10: 6
PAINLEVEL_OUTOF10: 0 - NO PAIN

## 2025-07-08 NOTE — DISCHARGE INSTRUCTIONS
DISCHARGE INSTRUCTIONS FOR INJECTIONS     After most injections, it is recommended that you relax and limit your activity for the remainder of the day unless you have been told otherwise by your pain physician.  You should not drive a car, operate machinery, or make important legal decisions unless otherwise directed by your pain physician.  You may resume your normal activity, including exercise, tomorrow.      Keep a written pain diary of how much pain relief you experienced following the injection procedure and the length of time of pain relief you experienced pain relief. Following diagnostic injections like medial branch nerve blocks, sacroiliac joint blocks, stellate ganglion injections and other blocks, it is very important you record the specific amount of pain relief you experienced immediately after the injectionand how long it lasted. Your doctor will ask you for this information at your follow up visit.     For all injections, please keep the injection site dry and inspect the site for a couple of days. You may remove the Band-Aid the day of the injection at any time.     Some discomfort, bruising or slight swelling may occur at the injection site. This is not abnormal if it occurs.  If needed you may:    -Take over the counter medication such as Tylenol or Motrin.   -Apply an ice pack for 30 minutes, 2 to 3 times a day for the first 24 hours.     You may shower today; no soaking baths, hot tubs, whirlpools or swimming pools for two days.      If you are given steroids in your injection, it may take 3-5 days for the steroid medication to take effect. You may notice a worsening of your symptoms for 1-2 days after the injection. This is not abnormal.  You may use acetaminophen, ibuprofen, or prescription medication that your doctor may have prescribed for you if you need to do so.     A few common side effects of steroids include facial flushing, sweating, restlessness, irritability,difficulty sleeping,  increase in blood sugar, and increased blood pressure. If you have diabetes, please monitor your blood sugar at least once a day for at least 5 days. If you have poorly controlled high blood pressure, monitoryour blood pressure for at least 2 days and contact your primary care physician if these numbers are unusually high for you.      If you take aspirin or non-steroidal anti-inflammatory drugs (examples are Motrin, Advil, ibuprofen, Naprosyn, Voltaren, Relafen, etc.) you may restart these this evening, but stop taking it 3 days before your next appointment, unless instructed otherwiseby your physician.      You do not need to discontinue non-aspirin-containing pain medications prior to an injection (examples: Celebrex, tramadol, hydrocodone and acetaminophen).      If you take a blood thinning medication (Coumadin, Lovenox, Fragmin,Ticlid, Plavix, Pradaxa, etc.), please discuss this with your primary care physician/cardiologist and your pain physician. These medications MUST be discontinued before you can have an injection safely, without the risk of uncontrolled bleeding. If these medications are not discontinued for an appropriate period of time, you will not be able to receivean injection. Please adhere to instructions given to you about when to restart your blood thinning medication. If you have any questions please reach out to our team.    If you are taking Coumadin, please have your INR checked the morning of your procedure and bring the result to your appointment unless otherwise instructed. If your INR is over 1.2, your injection may need to be rescheduled to avoid uncontrolled bleeding from the needle placement.     Call [facility]  and ask for Pain Management at [phone] between 8am-4pm Monday - Friday if you are experiencing the following:    If you received an epidural or spinal injection:    -Headache that doesnot go away with medicine, is worse when sitting or standing up, and is greatly  relieved upon lying down.   -Severe pain worse than or different than your baseline pain.   -Chills or fever (101º F or greater).   -Drainage or signs of infection at the injection site     Go directly to the Emergency Department if you are experiencing the following and received an epidural or spinal injection:   -Abrupt weakness or progressive weakness in your legs that starts after you leave the clinic.   -Abrupt severe or worsening numbness in your legs.   -Inability to urinate after the injection or loss of bowel or bladder control without the urge to defecate or urinate.     If you have a clinical question that cannot wait until your next appointment, please call [phone] between 8am-4pm Monday - Friday or send a SmartHub message. We do our best to return all non-emergency messages within 24 hours, Monday - Friday. A nurse or physician will return your message. You may also try calling and they will do their best to answer your question(s):   - Dr. Checo Tucker/Dianne's nurse ([phone])     If you need to cancel an appointment, please call the scheduling staff at [phone] during normal business hours or leave a message at least 24 hours in advance.     If you are going to be sedated for your next procedure, you MUST have responsible adult who can legally drive accompany you home. You cannot eat or drink for at least eight hours prior to the planned procedure if you are going to receive sedation. You may take your non-blood thinning medications with a small sip of water.

## 2025-07-08 NOTE — H&P
Pain Management H&P    History Of Present Illness  Florence Albarran is a 49 y.o. female presents for procedure stated below. Endorses no changes in past medical history or medical health since last seen in clinic.      Past Medical History  She has a past medical history of Acute atopic conjunctivitis, unspecified eye (12/20/2017), Acute candidiasis of vulva and vagina (04/02/2015), Anogenital (venereal) warts (02/24/2016), Anogenital (venereal) warts (01/20/2016), Anogenital (venereal) warts (11/18/2015), Body mass index (BMI) 27.0-27.9, adult (09/19/2018), Calculus of kidney (11/10/2017), Cervicalgia (06/01/2018), Cervicalgia (03/16/2018), Cervicalgia (07/20/2018), Cervicalgia (05/08/2018), Cervicalgia (08/10/2018), Chronic migraine without aura, intractable, with status migrainosus (06/01/2018), Chronic migraine without aura, intractable, with status migrainosus (07/20/2018), Chronic migraine without aura, intractable, with status migrainosus (05/08/2018), Chronic migraine without aura, intractable, with status migrainosus (08/10/2018), 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 (10/05/2018), Encounter for gynecological examination (general) (routine) without abnormal findings (11/10/2017), Encounter for other preprocedural examination (07/29/2019), Encounter for screening for infections with a predominantly sexual mode of transmission (06/01/2018), Encounter for screening for infections with a predominantly sexual mode of transmission (06/01/2018), Encounter for screening for infections with a predominantly sexual mode of transmission (07/20/2018), Encounter for screening for infections with a predominantly sexual mode of transmission (07/20/2018), Encounter for screening for infections with a predominantly sexual mode of transmission (05/08/2018), Encounter for screening for infections with a predominantly sexual mode of transmission (05/08/2018), Encounter for screening for infections with a predominantly  sexual mode of transmission (08/10/2018), Encounter for screening for infections with a predominantly sexual mode of transmission (08/10/2018), Encounter for screening for infections with a predominantly sexual mode of transmission, Encounter for screening for infections with a predominantly sexual mode of transmission (11/10/2017), Encounter for screening for malignant neoplasm of colon (11/05/2017), Encounter for screening for malignant neoplasm of colon (09/26/2018), Encounter for screening mammogram for malignant neoplasm of breast (06/01/2018), Encounter for screening mammogram for malignant neoplasm of breast (07/20/2018), Encounter for screening mammogram for malignant neoplasm of breast (05/08/2018), Encounter for screening mammogram for malignant neoplasm of breast (08/10/2018), Fever, unspecified (09/10/2018), Left upper quadrant pain (06/16/2020), Meibomian gland dysfunction of right eye, unspecified eyelid (08/10/2018), Migraine, unspecified, not intractable, without status migrainosus (08/10/2018), Ocular pain, left eye (09/28/2018), Ocular pain, right eye (09/28/2018), Other specified noninflammatory disorders of vulva and perineum (02/24/2016), Pain disorder with related psychological factors (10/01/2018), Pain in unspecified shoulder (06/04/2021), Personal history of diseases of the skin and subcutaneous tissue (09/18/2017), Personal history of other diseases of the digestive system (03/28/2017), Personal history of other diseases of the respiratory system (09/10/2018), Personal history of other diseases of the respiratory system (09/11/2018), Personal history of other infectious and parasitic diseases (02/26/2016), Personal history of other specified conditions (10/05/2018), Personal history of other specified conditions (03/28/2017), Personal history of other specified conditions (10/23/2017), Personal history of other specified conditions (09/19/2018), Personal history of other specified  conditions (09/03/2019), Personal history of other specified conditions (12/20/2017), Personal history of urinary (tract) infections (05/29/2015), Personal history of urinary (tract) infections (04/20/2017), Personal history of urinary calculi (12/08/2017), Right knee pain, Unspecified condition associated with female genital organs and menstrual cycle (02/24/2016), Urinary tract infection, site not specified (04/16/2019), Urinary tract infection, site not specified (04/16/2021), and Urinary tract infection, site not specified (09/11/2018).    Surgical History  She has a past surgical history that includes Dilation and curettage of uterus (06/15/2015); Laparoscopy diagnostic / biopsy / aspiration / lysis (06/15/2015); MR angio head wo IV contrast (11/17/2019); and MR angio neck wo IV contrast (11/17/2019).     Social History  She reports that she has never smoked. She has never used smokeless tobacco. She reports that she does not currently use alcohol. She reports that she does not use drugs.    Family History  Family History[1]     Allergies  Patient has no known allergies.    Review of Symptoms:   Constitutional: Negative for chills, diaphoresis or fever  HENT: Negative for neck swelling  Eyes:.  Negative for eye pain  Respiratory:.  Negative for cough, shortness of breath or wheezing    Cardiovascular:.  Negative for chest pain or palpitations  Gastrointestinal:.  Negative for abdominal pain, nausea and vomiting  Genitourinary:.  Negative for urgency  Musculoskeletal:  Positive for back pain. Positive for joint pain. Denies falls within the past 3 months.  Skin: Negative for wounds or itching   Neurological: Negative for dizziness, seizures, loss of consciousness and weakness  Endo/Heme/Allergies: Does not bruise/bleed easily  Psychiatric/Behavioral: Negative for depression. The patient does not appear anxious.      Pre-sedation Evaluation  ASA class 3  Mallampati score 2     PHYSICAL EXAM  Vitals signs  reviewed  Constitutional:       General: Not in acute distress     Appearance: Normal appearance. Not ill-appearing.  HENT:     Head: Normocephalic and atraumatic  Eyes:     Conjunctiva/sclera: Conjunctivae normal  Cardiovascular:     Rate and Rhythm: Normal rate and regular rhythm  Pulmonary:     Effort: No respiratory distress  Abdominal:     Palpations: Abdomen is soft  Musculoskeletal: QIU  Skin:     General: Skin is warm and dry  Neurological:     General: No focal deficit present  Psychiatric:         Mood and Affect: Mood normal         Behavior: Behavior normal     Last Recorded Vitals  /87   Pulse 90   Temp 36.8 °C (98.2 °F) (Temporal)   Resp 16   Wt 65.8 kg (145 lb)   SpO2 100%     Relevant Results  Current Outpatient Medications   Medication Instructions    aspirin-acetaminophen-caffeine (Excedrin Extra Strength) 250-250-65 mg tablet 2 tablets, Daily    baclofen (LIORESAL) 10 mg, oral, 3 times daily    clobetasol (Temovate) 0.05 % cream     CompoundMax Base cream [Apply 1 to 2 grams (1 to 2 Pumps) topically to the affected area every 6 to 8 hours as needed for pain. (Baclofen 5%, Cyclobenzaprine 2%, Lidocaine 5%)    cream base no.171 (COMPOUNDMAX BASE MISC) APPLY 1-2 GRAMS (1-2 PUMPS) TOPICALLY EVERY 6-8 HOURS AS NEEDED FOR PAIN. (BACLOFEN 5%, CYCLOBENZAPRINE 2%, DICLOFENAC 3%, GABAPENTIN 10%, LIDOCAINE 5% IN SALTSTABLE)    dihydroergotamine (Trudhesa) 0.725 mg/pump act. (4 mg/mL) nasal spray 1 spray, Each Nostril, Every 1 hour PRN, Dose may be repeated in 1 hour after the first dose. No more than 2 doses within 24 hours or 3 doses within 7 days.    fremanezumab (Ajovy Autoinjector) 225 mg/1.5 mL auto-injector INJECT 225 MG (1 PEN) UNDER THE SKIN ONCE A MONTH AS DIRECTED.    gabapentin (NEURONTIN) 600 mg, oral, Daily    Ibsrela 50 mg, oral, 2 times daily    latanoprost (Xalatan) 0.005 % ophthalmic solution 1 drop, 2 times daily    lidocaine (Lidoderm) 5 % patch 1 patch, transdermal, Daily,  Remove & discard patch within 12 hours or as directed by MD.    loteprednol (Lotemax) 0.5 % ophthalmic suspension INSTILL 1 TO 2 DROPS THREE TIMES DAILY TO BOTH EYES.    methocarbamol (Robaxin) 500 mg tablet     metoprolol succinate XL (TOPROL-XL) 50 mg, oral, Daily, Do not crush or chew.    Myrbetriq 50 mg, oral, Daily    norethindrone-e.estradioL-iron (Finzala) 1 mg-20 mcg(24) /75 mg (4) tablet,chewable 1 tablet, oral, Daily, Please take it continuously, skipping the placebo pills    omeprazole (PRILOSEC) 20 mg, oral, Daily before breakfast    onabotulinumtoxinA (Botox) 100 unit injection PHYSICIAN TO INJECT 200 UNITS INTRAMUSCULARLY TO HEAD AND NECK EVERY 90 DAYS    ondansetron (Zofran) 8 mg tablet TAKE ONE (1) TABLET BY MOUTH EVERY 8 HOURS AS NEEDED.    Opzelura 1.5 % cream     plecanatide (Trulance) tablet tablet 1 tablet, Daily    rimegepant (Nurtec ODT) 75 mg tablet,disintegrating Allow one (1) tablet to dissolve on or under the tongue once daily as needed for migraine.    rizatriptan MLT (Maxalt-MLT) 10 mg disintegrating tablet allow 1 tablet by mouth to dissolve on or under the tongue at the onset of headache. may repeat every 2 hours as needed. maximum of 3 tablets in 24 hours    SaltStable LS cream 60 g    tacrolimus (Protopic) 0.03 % ointment apply topically to the affected area twice daily    tiZANidine (ZANAFLEX) 8 mg, oral, As needed    tretinoin (Retin-A) 0.025 % cream     triamcinolone (Kenalog) 0.1 % cream     Ubrelvy 100 mg, oral, Once as needed, MAY REPEAT IN 2 HOURS. MAX DOSE 2 TABLETS IN 24 HOURS.    Xiidra 5 % dropperette 1 drop, 2 times daily    Zepbound 7.5 mg, subcutaneous, Every 7 days         MR cervical spine wo IV contrast 02/01/2025    Narrative  Interpreted By:  Anjel Mock,  STUDY:  MR CERVICAL SPINE WO IV CONTRAST;  2/1/2025 9:34 am    INDICATION:  Signs/Symptoms:c 3 radicular pain failed PT .nsaids. and muscle relax.    ,M54.12 Radiculopathy, cervical  region    COMPARISON:  None.    ACCESSION NUMBER(S):  [id number]    ORDERING CLINICIAN:  DEBBIE GARDINER    TECHNIQUE:  Multiplanar, multisequence images of the cervical spine were obtained  without contrast.    FINDINGS:  PARTIALLY VISUALIZED SKULL BASE: No significant abnormality.    PARASPINAL SOFT TISSUES: No significant paraspinal muscle  abnormality. In the thyroid gland there are multiple nodules, the  largest measuring 1.5 cm in the inferior right lobe. The thyroid  gland also appears mildly heterogeneous overall.    SPINAL CORD: The cervical cord has a normal caliber and signal  intensity. No cord compression.    ALIGNMENT: No spondylolisthesis.    BONE MARROW/VERTEBRAE:  Overall bone marrow signal is within normal  limits. Vertebral body heights are maintained. No acute fracture.    SPINAL CANAL, INTERVERTEBRAL DISCS, AND NEURAL FORAMINA:    Craniocervical junction: No acute abnormality.    C1-C2: The atlantodental joint is intact. The predental space is not  widened.    C2-C3:  No significant disc protrusion, canal stenosis, or foraminal  stenosis.    C3-C4:  No significant disc protrusion, canal stenosis, or foraminal  stenosis.    C4-C5: Minimal central disc protrusion. No significant canal or  neural foraminal stenosis.    C5-C6: Minimal central disc protrusion and tiny focal annular fissure  (axial T2WI 15, series 9). No significant canal or neural foraminal  stenosis.    C6-C7:  No significant disc protrusion, canal stenosis, or foraminal  stenosis.    C7-T1:  No significant disc protrusion, canal stenosis, or foraminal  stenosis.    Impression  1. Minimal central disc protrusions at C4-C5, C5-C6 without any  significant effect on the canal caliber. No significant neural  foraminal stenosis.    2. Heterogeneous thyroid gland with multiple nodules measuring up to  1.5 cm the right lobe. Recommend thyroid function testing and  follow-up nonemergent ultrasound of the thyroid gland for  further  characterization. YELLOW ALERT: An incidental finding alert was sent  per protocol.      MACRO:  Incidental Finding:  There are few small hypoattenuating nodules  measuring equal to or greater than 1.5 cm in the thyroid gland.  (**-YCF-**)    Instructions:  Further evaluation with nonemergent thyroid ultrasound.  (Managing Incidental Thyroid Nodules Detected on Imaging: White Paper  of the ACR Incidental Thyroid Findings Committee. Angie Wood. et  al. Journal of the American College of Radiology,Volume 12, Issue 2,  143 - 150.) [email or web address]    Signed by: Anjel Mock 2/2/2025 1:30 PM  Dictation workstation:   [id number]       ASSESSMENT/PLAN  Florence Albarran is a 49 y.o. female here for right C5/6 transforaminal epidural steroid injection w/ fluoroscopy and US guidance    Patient denies any recent antibiotic use or infections, denies any blood thinner use, and denies contrast or local anesthetic allergies     Risks, benefits, alternatives discussed. All questions answered to the best of my ability. Patient agrees to proceed.      Our plan is as follows:  - Proceed with aforementioned procedure       Anabel Doyle DO  Chronic Pain Management Fellow         [1]   Family History  Problem Relation Name Age of Onset    Lymphoma Mother      No Known Problems Father      Breast cancer Mother's Sister  69    Ovarian cancer Neg Hx

## 2025-07-14 PROCEDURE — RXMED WILLOW AMBULATORY MEDICATION CHARGE

## 2025-07-15 ENCOUNTER — PHARMACY VISIT (OUTPATIENT)
Dept: PHARMACY | Facility: CLINIC | Age: 50
End: 2025-07-15
Payer: COMMERCIAL

## 2025-07-21 ENCOUNTER — SPECIALTY PHARMACY (OUTPATIENT)
Dept: PHARMACY | Facility: CLINIC | Age: 50
End: 2025-07-21

## 2025-07-21 ENCOUNTER — TELEPHONE (OUTPATIENT)
Dept: NEUROLOGY | Facility: CLINIC | Age: 50
End: 2025-07-21
Payer: COMMERCIAL

## 2025-07-21 DIAGNOSIS — G43.711 CHRONIC MIGRAINE WITHOUT AURA, WITH INTRACTABLE MIGRAINE, SO STATED, WITH STATUS MIGRAINOSUS: ICD-10-CM

## 2025-07-21 PROCEDURE — RXMED WILLOW AMBULATORY MEDICATION CHARGE

## 2025-07-21 RX ORDER — METHYLPREDNISOLONE 4 MG/1
TABLET ORAL
Qty: 147 TABLET | Refills: 0 | Status: SHIPPED | OUTPATIENT
Start: 2025-07-21

## 2025-07-22 ENCOUNTER — PHARMACY VISIT (OUTPATIENT)
Dept: PHARMACY | Facility: CLINIC | Age: 50
End: 2025-07-22
Payer: COMMERCIAL

## 2025-07-22 NOTE — PROGRESS NOTES
Subjective      Chief Complaint   Patient presents with    Right Knee - Pain    Left Knee - Pain     HPI  This 48 year old patient presents today with right and left knee pain (8/10). The patient states that this right and left knee pain has been present for years. She completed a Durolane injection to the right knee in September with excellent relief of the right knee pain until about a two months ago.  She recently had a cortisone injection on 5-6-2025 and his cortisone injection gave her no relief of the right knee pain.  There has been no recent trauma . The patient rates the knee pain as 8/10. The patient states that knee pain is worse with and aggravated by activity and bearing weight. The patient admits to right knee instability. The patient has tried ibuprofen and tylenol with no relief. The patient also notes lumbar back pain that has been present for the past several months.  She denies any trauma or injury.  She currently follows with pain management for cervical radiculopathy.  She states that the back pain is worse with and aggravated by prolonged walking and standing.  She denies any saddle anesthesia or loss of bladder or bowel control.  She denies any right-sided sciatica on examination today. She presents today for follow up of right and left knee pain 8/10 and to discuss the possibility of applying for gel injections.    She presents to the office today for the Durolan injections to the left and right knee. Today she states the pain is 8/10.     CARDIOLOGY:   Negative for chest pain, shortness of breath.   RESPIRATORY:   Negative for chest pain, shortness of breath.   MUSCULOSKELETAL:   See HPI for details.   NEUROLOGY:   Negative for tingling, numbness, weakness.    Objective    There were no vitals filed for this visit.    Knee Exam  Constitutional: Appears stated age. No apparent distress  Labored Breathing: No  Psychiatric: Normal mood and effect.   Neurological: alert and oriented x3  Skin:  intact  MUSCULOSKELETAL: Neck: No tenderness. No pain or limitation with range of motion. Back: Mild tenderness to palpation over the mid line of the lumbar spine.  No tenderness over the right or left SI joint.  No pain with gentle flexion extension of the lumbar spine.  The patient is able to walk on her heels and toes with no problems.. Straight leg test negative bilaterally. Left knee: There is diffuse tenderness over the knee at the medial joint line. full range of motion McMurrays is negative. There is tenderness over the lateral collateral ligament with palpation. Anterior drawer and lachmans are negative. There is not an effusion present.  There is laxity with valgus and varus stressing.   right knee: There is diffuse tenderness over the knee at the medial joint line. full range of motion McMurrays is negative. Anterior drawer and lachmans are negative. There is not an effusion present.  There is laxity with valgus and varus stressing.  The patient walks with a painful gait favoring the right knee while walking.    Standing AP x-rays of both knees and lateral x-rays of the right knee done and read in office today show osteoarthritis of the right and left knee with osteoarthritis worse at the medial compartment.     Patient ID: Florence Albarran is a 49 y.o. female.    Patient ID: Florence Albarran is a 49 y.o. female.    L Inj/Asp: bilateral knee on 7/24/2025 8:19 AM  Indications: pain  Details: 22 G needle, medial approach  Medications (Right): 1 mL lidocaine 10 mg/mL (1 %); 60 mg sodium hyaluronate 60 mg/3 mL  Medications (Left): 1 mL lidocaine 10 mg/mL (1 %); 60 mg sodium hyaluronate 60 mg/3 mL  Outcome: tolerated well, no immediate complications  Procedure, treatment alternatives, risks and benefits explained, specific risks discussed. Immediately prior to procedure a time out was called to verify the correct patient, procedure, equipment, support staff and site/side marked as required. Patient was prepped  and draped in the usual sterile fashion.           Florence was seen today for pain and pain.  Diagnoses and all orders for this visit:  Right knee pain, unspecified chronicity (Primary)  Left knee pain, unspecified chronicity  Chronic knee instability, right  Chronic midline low back pain without sciatica  Low back strain, initial encounter  Degenerative joint disease of cervical and lumbar spine  Primary osteoarthritis of right knee  Primary osteoarthritis of left knee    Options are discussed with the patient in detail. The patient is instructed regarding activity modification, ice, provider directed at home gentle strengthening and ROM exercises, and the appropriate use of Tylenol as needed for pain with its potential adverse reactions and side effects. The patient understands. The patient states that despite all the treatment listed above that this left and right knee pain is debilitating and  requests a discussion of further options.  Durolane injection to the left and right knee is discussed in the office today. This is done in the office today. See procedures below. Return as needed, Please note that this report has been produced using speech recognition software.  It may contain errors related to grammar, punctuation or spelling.  Electronically signed, but not reviewed.    Bell Chaney PA-C

## 2025-07-23 ENCOUNTER — APPOINTMENT (OUTPATIENT)
Dept: ORTHOPEDIC SURGERY | Facility: CLINIC | Age: 50
End: 2025-07-23
Payer: COMMERCIAL

## 2025-07-23 DIAGNOSIS — G89.29 CHRONIC MIDLINE LOW BACK PAIN WITHOUT SCIATICA: ICD-10-CM

## 2025-07-23 DIAGNOSIS — M23.51 CHRONIC KNEE INSTABILITY, RIGHT: ICD-10-CM

## 2025-07-23 DIAGNOSIS — M25.561 RIGHT KNEE PAIN, UNSPECIFIED CHRONICITY: ICD-10-CM

## 2025-07-23 DIAGNOSIS — M47.812 DEGENERATIVE JOINT DISEASE OF CERVICAL AND LUMBAR SPINE: ICD-10-CM

## 2025-07-23 DIAGNOSIS — M47.816 DEGENERATIVE JOINT DISEASE OF CERVICAL AND LUMBAR SPINE: ICD-10-CM

## 2025-07-23 DIAGNOSIS — M17.12 PRIMARY OSTEOARTHRITIS OF LEFT KNEE: ICD-10-CM

## 2025-07-23 DIAGNOSIS — M25.562 LEFT KNEE PAIN, UNSPECIFIED CHRONICITY: ICD-10-CM

## 2025-07-23 DIAGNOSIS — S39.012A LOW BACK STRAIN, INITIAL ENCOUNTER: ICD-10-CM

## 2025-07-23 DIAGNOSIS — M17.11 PRIMARY OSTEOARTHRITIS OF RIGHT KNEE: ICD-10-CM

## 2025-07-23 DIAGNOSIS — M54.50 CHRONIC MIDLINE LOW BACK PAIN WITHOUT SCIATICA: ICD-10-CM

## 2025-07-24 ENCOUNTER — OFFICE VISIT (OUTPATIENT)
Dept: ORTHOPEDIC SURGERY | Facility: CLINIC | Age: 50
End: 2025-07-24
Payer: COMMERCIAL

## 2025-07-24 VITALS — BODY MASS INDEX: 21.98 KG/M2 | WEIGHT: 145 LBS | HEIGHT: 68 IN

## 2025-07-24 DIAGNOSIS — M17.12 PRIMARY OSTEOARTHRITIS OF LEFT KNEE: ICD-10-CM

## 2025-07-24 DIAGNOSIS — M17.11 PRIMARY OSTEOARTHRITIS OF RIGHT KNEE: ICD-10-CM

## 2025-07-24 DIAGNOSIS — M25.562 LEFT KNEE PAIN, UNSPECIFIED CHRONICITY: ICD-10-CM

## 2025-07-24 DIAGNOSIS — M47.812 DEGENERATIVE JOINT DISEASE OF CERVICAL AND LUMBAR SPINE: ICD-10-CM

## 2025-07-24 DIAGNOSIS — S39.012A LOW BACK STRAIN, INITIAL ENCOUNTER: ICD-10-CM

## 2025-07-24 DIAGNOSIS — G89.29 CHRONIC MIDLINE LOW BACK PAIN WITHOUT SCIATICA: ICD-10-CM

## 2025-07-24 DIAGNOSIS — M25.561 RIGHT KNEE PAIN, UNSPECIFIED CHRONICITY: Primary | ICD-10-CM

## 2025-07-24 DIAGNOSIS — M54.50 CHRONIC MIDLINE LOW BACK PAIN WITHOUT SCIATICA: ICD-10-CM

## 2025-07-24 DIAGNOSIS — M47.816 DEGENERATIVE JOINT DISEASE OF CERVICAL AND LUMBAR SPINE: ICD-10-CM

## 2025-07-24 DIAGNOSIS — M23.51 CHRONIC KNEE INSTABILITY, RIGHT: ICD-10-CM

## 2025-07-24 PROCEDURE — 2500000004 HC RX 250 GENERAL PHARMACY W/ HCPCS (ALT 636 FOR OP/ED): Mod: JZ | Performed by: PHYSICIAN ASSISTANT

## 2025-07-24 PROCEDURE — 1036F TOBACCO NON-USER: CPT | Performed by: PHYSICIAN ASSISTANT

## 2025-07-24 PROCEDURE — 3008F BODY MASS INDEX DOCD: CPT | Performed by: PHYSICIAN ASSISTANT

## 2025-07-24 PROCEDURE — 99213 OFFICE O/P EST LOW 20 MIN: CPT | Performed by: PHYSICIAN ASSISTANT

## 2025-07-24 PROCEDURE — 20610 DRAIN/INJ JOINT/BURSA W/O US: CPT | Mod: 50 | Performed by: PHYSICIAN ASSISTANT

## 2025-07-24 RX ORDER — LIDOCAINE HYDROCHLORIDE 10 MG/ML
1 INJECTION, SOLUTION INFILTRATION; PERINEURAL
Status: COMPLETED | OUTPATIENT
Start: 2025-07-24 | End: 2025-07-24

## 2025-07-24 RX ADMIN — LIDOCAINE HYDROCHLORIDE 1 ML: 10 INJECTION, SOLUTION INFILTRATION; PERINEURAL at 08:19

## 2025-07-24 RX ADMIN — Medication 60 MG: at 08:19

## 2025-07-24 ASSESSMENT — LIFESTYLE VARIABLES
SKIP TO QUESTIONS 9-10: 1
AUDIT-C TOTAL SCORE: 0
HOW OFTEN DO YOU HAVE A DRINK CONTAINING ALCOHOL: NEVER
HOW MANY STANDARD DRINKS CONTAINING ALCOHOL DO YOU HAVE ON A TYPICAL DAY: PATIENT DOES NOT DRINK
HOW OFTEN DO YOU HAVE SIX OR MORE DRINKS ON ONE OCCASION: NEVER

## 2025-07-24 ASSESSMENT — ENCOUNTER SYMPTOMS
LOSS OF SENSATION IN FEET: 0
DEPRESSION: 0
OCCASIONAL FEELINGS OF UNSTEADINESS: 0

## 2025-07-24 ASSESSMENT — PAIN SCALES - GENERAL
PAINLEVEL_OUTOF10: 8
PAINLEVEL_OUTOF10: 8

## 2025-07-24 ASSESSMENT — PAIN - FUNCTIONAL ASSESSMENT: PAIN_FUNCTIONAL_ASSESSMENT: 0-10

## 2025-07-30 DIAGNOSIS — G43.711 INTRACTABLE CHRONIC MIGRAINE WITHOUT AURA AND WITH STATUS MIGRAINOSUS: ICD-10-CM

## 2025-07-30 PROCEDURE — RXMED WILLOW AMBULATORY MEDICATION CHARGE

## 2025-08-04 DIAGNOSIS — M54.2 NECK PAIN: Primary | ICD-10-CM

## 2025-08-05 ENCOUNTER — PHARMACY VISIT (OUTPATIENT)
Dept: PHARMACY | Facility: CLINIC | Age: 50
End: 2025-08-05
Payer: COMMERCIAL

## 2025-08-05 RX ORDER — METHOCARBAMOL 500 MG/1
500 TABLET, FILM COATED ORAL DAILY
Qty: 90 TABLET | Refills: 3 | Status: SHIPPED | OUTPATIENT
Start: 2025-08-05

## 2025-08-07 ENCOUNTER — SPECIALTY PHARMACY (OUTPATIENT)
Dept: PHARMACY | Facility: CLINIC | Age: 50
End: 2025-08-07

## 2025-08-08 PROCEDURE — RXMED WILLOW AMBULATORY MEDICATION CHARGE

## 2025-08-09 ENCOUNTER — PHARMACY VISIT (OUTPATIENT)
Dept: PHARMACY | Facility: CLINIC | Age: 50
End: 2025-08-09
Payer: COMMERCIAL

## 2025-08-11 PROCEDURE — RXMED WILLOW AMBULATORY MEDICATION CHARGE

## 2025-08-12 ENCOUNTER — SPECIALTY PHARMACY (OUTPATIENT)
Dept: PHARMACY | Facility: CLINIC | Age: 50
End: 2025-08-12

## 2025-08-14 ENCOUNTER — PHARMACY VISIT (OUTPATIENT)
Dept: PHARMACY | Facility: CLINIC | Age: 50
End: 2025-08-14
Payer: COMMERCIAL

## 2025-08-21 ENCOUNTER — PATIENT MESSAGE (OUTPATIENT)
Dept: PRIMARY CARE | Facility: CLINIC | Age: 50
End: 2025-08-21
Payer: COMMERCIAL

## 2025-08-21 DIAGNOSIS — R35.89 POLYURIA: Primary | ICD-10-CM

## 2025-08-25 ENCOUNTER — PHARMACY VISIT (OUTPATIENT)
Dept: PHARMACY | Facility: CLINIC | Age: 50
End: 2025-08-25
Payer: COMMERCIAL

## 2025-08-26 ENCOUNTER — SPECIALTY PHARMACY (OUTPATIENT)
Dept: PHARMACY | Facility: CLINIC | Age: 50
End: 2025-08-26

## 2025-08-27 ENCOUNTER — TELEPHONE (OUTPATIENT)
Dept: NEUROLOGY | Facility: CLINIC | Age: 50
End: 2025-08-27
Payer: COMMERCIAL

## 2025-08-27 DIAGNOSIS — M54.2 NECK PAIN: ICD-10-CM

## 2025-08-27 RX ORDER — METHOCARBAMOL 500 MG/1
TABLET, FILM COATED ORAL
Qty: 180 TABLET | Refills: 1 | Status: SHIPPED | OUTPATIENT
Start: 2025-08-27

## 2025-08-28 DIAGNOSIS — N20.0 NEPHROLITHIASIS: ICD-10-CM

## 2025-08-29 ENCOUNTER — HOSPITAL ENCOUNTER (OUTPATIENT)
Dept: RADIOLOGY | Facility: HOSPITAL | Age: 50
Discharge: HOME | End: 2025-08-29
Payer: COMMERCIAL

## 2025-08-29 ENCOUNTER — APPOINTMENT (OUTPATIENT)
Dept: RADIOLOGY | Facility: HOSPITAL | Age: 50
End: 2025-08-29
Payer: COMMERCIAL

## 2025-08-29 ENCOUNTER — APPOINTMENT (OUTPATIENT)
Dept: NEUROLOGY | Facility: HOSPITAL | Age: 50
End: 2025-08-29
Payer: COMMERCIAL

## 2025-08-29 ENCOUNTER — PROCEDURE VISIT (OUTPATIENT)
Dept: NEUROLOGY | Facility: HOSPITAL | Age: 50
End: 2025-08-29
Payer: COMMERCIAL

## 2025-08-29 DIAGNOSIS — G43.709 CHRONIC MIGRAINE WITHOUT AURA WITHOUT STATUS MIGRAINOSUS, NOT INTRACTABLE: ICD-10-CM

## 2025-08-29 DIAGNOSIS — N20.0 NEPHROLITHIASIS: ICD-10-CM

## 2025-08-29 PROCEDURE — 74176 CT ABD & PELVIS W/O CONTRAST: CPT

## 2025-08-29 RX ORDER — GABAPENTIN 600 MG/1
600 TABLET ORAL DAILY
Qty: 30 TABLET | Refills: 5 | Status: SHIPPED | OUTPATIENT
Start: 2025-08-29

## 2025-08-30 LAB
ANION GAP SERPL CALCULATED.4IONS-SCNC: 8 MMOL/L (CALC) (ref 7–17)
APPEARANCE UR: ABNORMAL
BACTERIA #/AREA URNS HPF: ABNORMAL /HPF
BACTERIA UR CULT: NORMAL
BILIRUB UR QL STRIP: NEGATIVE
BUN SERPL-MCNC: 12 MG/DL (ref 7–25)
BUN/CREAT SERPL: NORMAL (CALC) (ref 6–22)
CALCIUM SERPL-MCNC: 8.7 MG/DL (ref 8.6–10.2)
CHLORIDE SERPL-SCNC: 109 MMOL/L (ref 98–110)
CO2 SERPL-SCNC: 24 MMOL/L (ref 20–32)
COLOR UR: ABNORMAL
CREAT SERPL-MCNC: 0.61 MG/DL (ref 0.5–0.99)
EGFRCR SERPLBLD CKD-EPI 2021: 111 ML/MIN/1.73M2
EST. AVERAGE GLUCOSE BLD GHB EST-MCNC: 103 MG/DL
EST. AVERAGE GLUCOSE BLD GHB EST-SCNC: 5.7 MMOL/L
GLUCOSE SERPL-MCNC: 80 MG/DL (ref 65–99)
GLUCOSE UR QL STRIP: NEGATIVE
HBA1C MFR BLD: 5.2 %
HGB UR QL STRIP: ABNORMAL
HYALINE CASTS #/AREA URNS LPF: ABNORMAL /LPF
KETONES UR QL STRIP: ABNORMAL
LEUKOCYTE ESTERASE UR QL STRIP: ABNORMAL
NITRITE UR QL STRIP: NEGATIVE
PH UR STRIP: 6 [PH] (ref 5–8)
POTASSIUM SERPL-SCNC: 4.3 MMOL/L (ref 3.5–5.3)
PROT UR QL STRIP: ABNORMAL
RBC #/AREA URNS HPF: ABNORMAL /HPF
SERVICE CMNT-IMP: ABNORMAL
SODIUM SERPL-SCNC: 141 MMOL/L (ref 135–146)
SP GR UR STRIP: 1.03 (ref 1–1.03)
SQUAMOUS #/AREA URNS HPF: ABNORMAL /HPF
WBC #/AREA URNS HPF: ABNORMAL /HPF

## 2025-09-02 ENCOUNTER — APPOINTMENT (OUTPATIENT)
Dept: UROLOGY | Facility: CLINIC | Age: 50
End: 2025-09-02
Payer: COMMERCIAL

## 2025-09-03 ENCOUNTER — SPECIALTY PHARMACY (OUTPATIENT)
Dept: PHARMACY | Facility: CLINIC | Age: 50
End: 2025-09-03

## 2025-09-03 DIAGNOSIS — K58.1 IRRITABLE BOWEL SYNDROME WITH CONSTIPATION: ICD-10-CM

## 2025-09-03 RX ORDER — TENAPANOR HYDROCHLORIDE 53.2 MG/1
50 TABLET ORAL 2 TIMES DAILY
Qty: 60 TABLET | Refills: 1 | Status: SHIPPED | OUTPATIENT
Start: 2025-09-03

## 2025-10-16 ENCOUNTER — APPOINTMENT (OUTPATIENT)
Dept: PRIMARY CARE | Facility: CLINIC | Age: 50
End: 2025-10-16
Payer: COMMERCIAL

## 2025-10-23 ENCOUNTER — APPOINTMENT (OUTPATIENT)
Dept: UROLOGY | Facility: CLINIC | Age: 50
End: 2025-10-23
Payer: COMMERCIAL

## 2026-02-10 ENCOUNTER — APPOINTMENT (OUTPATIENT)
Dept: OBSTETRICS AND GYNECOLOGY | Facility: CLINIC | Age: 51
End: 2026-02-10
Payer: COMMERCIAL

## 2026-06-23 ENCOUNTER — APPOINTMENT (OUTPATIENT)
Dept: UROLOGY | Facility: CLINIC | Age: 51
End: 2026-06-23
Payer: COMMERCIAL

## 2026-09-08 ENCOUNTER — APPOINTMENT (OUTPATIENT)
Dept: UROLOGY | Facility: CLINIC | Age: 51
End: 2026-09-08
Payer: COMMERCIAL